# Patient Record
Sex: FEMALE | Race: WHITE | NOT HISPANIC OR LATINO | Employment: OTHER | URBAN - METROPOLITAN AREA
[De-identification: names, ages, dates, MRNs, and addresses within clinical notes are randomized per-mention and may not be internally consistent; named-entity substitution may affect disease eponyms.]

---

## 2017-02-19 ENCOUNTER — COMMUNICATION - HEALTHEAST (OUTPATIENT)
Dept: INTERNAL MEDICINE | Facility: CLINIC | Age: 82
End: 2017-02-19

## 2017-02-21 ENCOUNTER — COMMUNICATION - HEALTHEAST (OUTPATIENT)
Dept: INTERNAL MEDICINE | Facility: CLINIC | Age: 82
End: 2017-02-21

## 2017-03-15 ENCOUNTER — OFFICE VISIT - HEALTHEAST (OUTPATIENT)
Dept: INTERNAL MEDICINE | Facility: CLINIC | Age: 82
End: 2017-03-15

## 2017-03-15 DIAGNOSIS — I10 ESSENTIAL HYPERTENSION: ICD-10-CM

## 2017-05-09 ENCOUNTER — AMBULATORY - HEALTHEAST (OUTPATIENT)
Dept: NURSING | Facility: CLINIC | Age: 82
End: 2017-05-09

## 2017-05-16 ENCOUNTER — RECORDS - HEALTHEAST (OUTPATIENT)
Dept: ADMINISTRATIVE | Facility: OTHER | Age: 82
End: 2017-05-16

## 2017-05-23 ENCOUNTER — COMMUNICATION - HEALTHEAST (OUTPATIENT)
Dept: INTERNAL MEDICINE | Facility: CLINIC | Age: 82
End: 2017-05-23

## 2017-07-22 ENCOUNTER — OFFICE VISIT - HEALTHEAST (OUTPATIENT)
Dept: FAMILY MEDICINE | Facility: CLINIC | Age: 82
End: 2017-07-22
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
## 2017-08-03 ENCOUNTER — COMMUNICATION - HEALTHEAST (OUTPATIENT)
Dept: INTERNAL MEDICINE | Facility: CLINIC | Age: 82
End: 2017-08-03

## 2017-08-03 ENCOUNTER — OFFICE VISIT - HEALTHEAST (OUTPATIENT)
Dept: INTERNAL MEDICINE | Facility: CLINIC | Age: 82
End: 2017-08-03

## 2017-08-03 DIAGNOSIS — I10 HTN (HYPERTENSION): ICD-10-CM

## 2017-08-03 DIAGNOSIS — Z00.00 ROUTINE GENERAL MEDICAL EXAMINATION AT A HEALTH CARE FACILITY: ICD-10-CM

## 2017-08-03 DIAGNOSIS — E78.5 HYPERLIPEMIA: ICD-10-CM

## 2017-08-03 DIAGNOSIS — D48.5 NEOPLASM OF UNCERTAIN BEHAVIOR OF SKIN: ICD-10-CM

## 2017-08-03 DIAGNOSIS — I10 ESSENTIAL HYPERTENSION: ICD-10-CM

## 2017-08-03 DIAGNOSIS — M81.0 OSTEOPOROSIS: ICD-10-CM

## 2017-08-03 ASSESSMENT — MIFFLIN-ST. JEOR: SCORE: 916.2

## 2017-08-08 ENCOUNTER — AMBULATORY - HEALTHEAST (OUTPATIENT)
Dept: LAB | Facility: CLINIC | Age: 82
End: 2017-08-08

## 2017-08-08 DIAGNOSIS — M81.0 OSTEOPOROSIS: ICD-10-CM

## 2017-08-08 DIAGNOSIS — I10 ESSENTIAL HYPERTENSION: ICD-10-CM

## 2017-08-08 DIAGNOSIS — E78.5 HYPERLIPEMIA: ICD-10-CM

## 2017-08-08 LAB
CHOLEST SERPL-MCNC: 172 MG/DL
FASTING STATUS PATIENT QL REPORTED: YES
HDLC SERPL-MCNC: 55 MG/DL
LDLC SERPL CALC-MCNC: 104 MG/DL
TRIGL SERPL-MCNC: 67 MG/DL

## 2017-08-09 ENCOUNTER — COMMUNICATION - HEALTHEAST (OUTPATIENT)
Dept: INTERNAL MEDICINE | Facility: CLINIC | Age: 82
End: 2017-08-09

## 2017-08-23 ENCOUNTER — RECORDS - HEALTHEAST (OUTPATIENT)
Dept: ADMINISTRATIVE | Facility: OTHER | Age: 82
End: 2017-08-23

## 2017-10-23 ENCOUNTER — COMMUNICATION - HEALTHEAST (OUTPATIENT)
Dept: INTERNAL MEDICINE | Facility: CLINIC | Age: 82
End: 2017-10-23

## 2017-10-23 DIAGNOSIS — M81.0 OSTEOPOROSIS: ICD-10-CM

## 2017-10-23 DIAGNOSIS — I10 HTN (HYPERTENSION): ICD-10-CM

## 2017-11-08 ENCOUNTER — COMMUNICATION - HEALTHEAST (OUTPATIENT)
Dept: INTERNAL MEDICINE | Facility: CLINIC | Age: 82
End: 2017-11-08

## 2017-11-08 DIAGNOSIS — I10 ESSENTIAL HYPERTENSION: ICD-10-CM

## 2017-11-14 ENCOUNTER — RECORDS - HEALTHEAST (OUTPATIENT)
Dept: ADMINISTRATIVE | Facility: OTHER | Age: 82
End: 2017-11-14

## 2017-12-05 ENCOUNTER — COMMUNICATION - HEALTHEAST (OUTPATIENT)
Dept: INTERNAL MEDICINE | Facility: CLINIC | Age: 82
End: 2017-12-05

## 2017-12-05 DIAGNOSIS — E78.5 HYPERLIPEMIA: ICD-10-CM

## 2018-02-01 ENCOUNTER — RECORDS - HEALTHEAST (OUTPATIENT)
Dept: ADMINISTRATIVE | Facility: OTHER | Age: 83
End: 2018-02-01

## 2018-02-21 ENCOUNTER — COMMUNICATION - HEALTHEAST (OUTPATIENT)
Dept: INTERNAL MEDICINE | Facility: CLINIC | Age: 83
End: 2018-02-21

## 2018-02-23 ENCOUNTER — OFFICE VISIT - HEALTHEAST (OUTPATIENT)
Dept: INTERNAL MEDICINE | Facility: CLINIC | Age: 83
End: 2018-02-23

## 2018-02-23 DIAGNOSIS — Z01.818 PREOP GENERAL PHYSICAL EXAM: ICD-10-CM

## 2018-02-23 DIAGNOSIS — I10 ESSENTIAL HYPERTENSION: ICD-10-CM

## 2018-02-23 DIAGNOSIS — H26.9 CATARACT OF BOTH EYES: ICD-10-CM

## 2018-02-23 DIAGNOSIS — E78.5 HYPERLIPEMIA: ICD-10-CM

## 2018-03-07 ENCOUNTER — COMMUNICATION - HEALTHEAST (OUTPATIENT)
Dept: INTERNAL MEDICINE | Facility: CLINIC | Age: 83
End: 2018-03-07

## 2018-03-07 DIAGNOSIS — I10 ESSENTIAL HYPERTENSION: ICD-10-CM

## 2018-03-14 ENCOUNTER — OFFICE VISIT - HEALTHEAST (OUTPATIENT)
Dept: INTERNAL MEDICINE | Facility: CLINIC | Age: 83
End: 2018-03-14

## 2018-03-14 DIAGNOSIS — I10 ESSENTIAL HYPERTENSION: ICD-10-CM

## 2018-03-14 DIAGNOSIS — H61.22 IMPACTED CERUMEN OF LEFT EAR: ICD-10-CM

## 2018-07-10 ENCOUNTER — COMMUNICATION - HEALTHEAST (OUTPATIENT)
Dept: INTERNAL MEDICINE | Facility: CLINIC | Age: 83
End: 2018-07-10

## 2018-07-10 DIAGNOSIS — M81.0 OSTEOPOROSIS: ICD-10-CM

## 2018-09-06 ENCOUNTER — OFFICE VISIT - HEALTHEAST (OUTPATIENT)
Dept: INTERNAL MEDICINE | Facility: CLINIC | Age: 83
End: 2018-09-06

## 2018-09-06 DIAGNOSIS — I10 HTN (HYPERTENSION): ICD-10-CM

## 2018-09-06 DIAGNOSIS — E78.5 HYPERLIPEMIA: ICD-10-CM

## 2018-09-06 DIAGNOSIS — M81.0 OSTEOPOROSIS: ICD-10-CM

## 2018-09-06 DIAGNOSIS — Z00.00 ROUTINE GENERAL MEDICAL EXAMINATION AT A HEALTH CARE FACILITY: ICD-10-CM

## 2018-09-06 DIAGNOSIS — H61.21 IMPACTED CERUMEN OF RIGHT EAR: ICD-10-CM

## 2018-09-06 LAB
ALBUMIN SERPL-MCNC: 4.1 G/DL (ref 3.5–5)
ALP SERPL-CCNC: 67 U/L (ref 45–120)
ALT SERPL W P-5'-P-CCNC: 18 U/L (ref 0–45)
ANION GAP SERPL CALCULATED.3IONS-SCNC: 6 MMOL/L (ref 5–18)
AST SERPL W P-5'-P-CCNC: 20 U/L (ref 0–40)
BILIRUB SERPL-MCNC: 0.9 MG/DL (ref 0–1)
BUN SERPL-MCNC: 9 MG/DL (ref 8–28)
CALCIUM SERPL-MCNC: 9.5 MG/DL (ref 8.5–10.5)
CHLORIDE BLD-SCNC: 102 MMOL/L (ref 98–107)
CHOLEST SERPL-MCNC: 170 MG/DL
CO2 SERPL-SCNC: 29 MMOL/L (ref 22–31)
CREAT SERPL-MCNC: 0.7 MG/DL (ref 0.6–1.1)
FASTING STATUS PATIENT QL REPORTED: YES
GFR SERPL CREATININE-BSD FRML MDRD: >60 ML/MIN/1.73M2
GLUCOSE BLD-MCNC: 97 MG/DL (ref 70–125)
HDLC SERPL-MCNC: 58 MG/DL
LDLC SERPL CALC-MCNC: 96 MG/DL
POTASSIUM BLD-SCNC: 4 MMOL/L (ref 3.5–5)
PROT SERPL-MCNC: 7 G/DL (ref 6–8)
SODIUM SERPL-SCNC: 137 MMOL/L (ref 136–145)
TRIGL SERPL-MCNC: 81 MG/DL

## 2018-09-06 ASSESSMENT — MIFFLIN-ST. JEOR: SCORE: 886.78

## 2018-09-07 LAB — 25(OH)D3 SERPL-MCNC: 44.6 NG/ML (ref 30–80)

## 2018-09-10 ENCOUNTER — COMMUNICATION - HEALTHEAST (OUTPATIENT)
Dept: INTERNAL MEDICINE | Facility: CLINIC | Age: 83
End: 2018-09-10

## 2018-09-13 ENCOUNTER — OFFICE VISIT - HEALTHEAST (OUTPATIENT)
Dept: OTOLARYNGOLOGY | Facility: CLINIC | Age: 83
End: 2018-09-13

## 2018-09-13 DIAGNOSIS — H61.23 BILATERAL IMPACTED CERUMEN: ICD-10-CM

## 2018-09-17 ENCOUNTER — RECORDS - HEALTHEAST (OUTPATIENT)
Dept: BONE DENSITY | Facility: CLINIC | Age: 83
End: 2018-09-17

## 2018-09-17 ENCOUNTER — RECORDS - HEALTHEAST (OUTPATIENT)
Dept: ADMINISTRATIVE | Facility: OTHER | Age: 83
End: 2018-09-17

## 2018-09-17 DIAGNOSIS — M81.0 AGE-RELATED OSTEOPOROSIS WITHOUT CURRENT PATHOLOGICAL FRACTURE: ICD-10-CM

## 2019-03-05 ENCOUNTER — COMMUNICATION - HEALTHEAST (OUTPATIENT)
Dept: INTERNAL MEDICINE | Facility: CLINIC | Age: 84
End: 2019-03-05

## 2019-03-05 DIAGNOSIS — I10 ESSENTIAL HYPERTENSION: ICD-10-CM

## 2019-03-19 ENCOUNTER — COMMUNICATION - HEALTHEAST (OUTPATIENT)
Dept: FAMILY MEDICINE | Facility: CLINIC | Age: 84
End: 2019-03-19

## 2019-05-09 ENCOUNTER — COMMUNICATION - HEALTHEAST (OUTPATIENT)
Dept: INTERNAL MEDICINE | Facility: CLINIC | Age: 84
End: 2019-05-09

## 2019-05-09 DIAGNOSIS — I10 ESSENTIAL HYPERTENSION: ICD-10-CM

## 2019-05-16 ENCOUNTER — RECORDS - HEALTHEAST (OUTPATIENT)
Dept: ADMINISTRATIVE | Facility: OTHER | Age: 84
End: 2019-05-16

## 2019-07-29 ENCOUNTER — COMMUNICATION - HEALTHEAST (OUTPATIENT)
Dept: INTERNAL MEDICINE | Facility: CLINIC | Age: 84
End: 2019-07-29

## 2019-07-29 DIAGNOSIS — I10 ESSENTIAL HYPERTENSION: ICD-10-CM

## 2019-09-02 ENCOUNTER — COMMUNICATION - HEALTHEAST (OUTPATIENT)
Dept: INTERNAL MEDICINE | Facility: CLINIC | Age: 84
End: 2019-09-02

## 2019-09-02 DIAGNOSIS — I10 ESSENTIAL HYPERTENSION: ICD-10-CM

## 2019-09-17 ENCOUNTER — COMMUNICATION - HEALTHEAST (OUTPATIENT)
Dept: INTERNAL MEDICINE | Facility: CLINIC | Age: 84
End: 2019-09-17

## 2019-09-17 DIAGNOSIS — M81.0 OSTEOPOROSIS: ICD-10-CM

## 2019-09-24 ENCOUNTER — OFFICE VISIT - HEALTHEAST (OUTPATIENT)
Dept: INTERNAL MEDICINE | Facility: CLINIC | Age: 84
End: 2019-09-24

## 2019-09-24 DIAGNOSIS — I10 ESSENTIAL HYPERTENSION: ICD-10-CM

## 2019-09-24 DIAGNOSIS — Z12.31 VISIT FOR SCREENING MAMMOGRAM: ICD-10-CM

## 2019-09-24 DIAGNOSIS — Z51.81 ENCOUNTER FOR THERAPEUTIC DRUG MONITORING: ICD-10-CM

## 2019-09-24 DIAGNOSIS — E78.00 HYPERCHOLESTEROLEMIA: ICD-10-CM

## 2019-09-24 DIAGNOSIS — M81.0 OSTEOPOROSIS, UNSPECIFIED OSTEOPOROSIS TYPE, UNSPECIFIED PATHOLOGICAL FRACTURE PRESENCE: ICD-10-CM

## 2019-09-24 LAB
ALBUMIN SERPL-MCNC: 4.3 G/DL (ref 3.5–5)
ALP SERPL-CCNC: 70 U/L (ref 45–120)
ALT SERPL W P-5'-P-CCNC: 19 U/L (ref 0–45)
ANION GAP SERPL CALCULATED.3IONS-SCNC: 9 MMOL/L (ref 5–18)
AST SERPL W P-5'-P-CCNC: 23 U/L (ref 0–40)
BILIRUB SERPL-MCNC: 0.9 MG/DL (ref 0–1)
BUN SERPL-MCNC: 9 MG/DL (ref 8–28)
CALCIUM SERPL-MCNC: 9.5 MG/DL (ref 8.5–10.5)
CHLORIDE BLD-SCNC: 98 MMOL/L (ref 98–107)
CHOLEST SERPL-MCNC: 184 MG/DL
CO2 SERPL-SCNC: 27 MMOL/L (ref 22–31)
CREAT SERPL-MCNC: 0.72 MG/DL (ref 0.6–1.1)
ERYTHROCYTE [DISTWIDTH] IN BLOOD BY AUTOMATED COUNT: 10.7 % (ref 11–14.5)
FASTING STATUS PATIENT QL REPORTED: YES
GFR SERPL CREATININE-BSD FRML MDRD: >60 ML/MIN/1.73M2
GLUCOSE BLD-MCNC: 97 MG/DL (ref 70–125)
HCT VFR BLD AUTO: 39.7 % (ref 35–47)
HDLC SERPL-MCNC: 60 MG/DL
HGB BLD-MCNC: 13.4 G/DL (ref 12–16)
LDLC SERPL CALC-MCNC: 106 MG/DL
MCH RBC QN AUTO: 33.7 PG (ref 27–34)
MCHC RBC AUTO-ENTMCNC: 33.9 G/DL (ref 32–36)
MCV RBC AUTO: 100 FL (ref 80–100)
PLATELET # BLD AUTO: 221 THOU/UL (ref 140–440)
PMV BLD AUTO: 7.8 FL (ref 7–10)
POTASSIUM BLD-SCNC: 4.2 MMOL/L (ref 3.5–5)
PROT SERPL-MCNC: 7.1 G/DL (ref 6–8)
RBC # BLD AUTO: 3.99 MILL/UL (ref 3.8–5.4)
SODIUM SERPL-SCNC: 134 MMOL/L (ref 136–145)
TRIGL SERPL-MCNC: 90 MG/DL
WBC: 6.3 THOU/UL (ref 4–11)

## 2019-09-25 ENCOUNTER — COMMUNICATION - HEALTHEAST (OUTPATIENT)
Dept: INTERNAL MEDICINE | Facility: CLINIC | Age: 84
End: 2019-09-25

## 2020-02-19 ENCOUNTER — RECORDS - HEALTHEAST (OUTPATIENT)
Dept: ADMINISTRATIVE | Facility: OTHER | Age: 85
End: 2020-02-19

## 2020-09-28 ENCOUNTER — COMMUNICATION - HEALTHEAST (OUTPATIENT)
Dept: SCHEDULING | Facility: CLINIC | Age: 85
End: 2020-09-28

## 2020-09-28 ENCOUNTER — COMMUNICATION - HEALTHEAST (OUTPATIENT)
Dept: INTERNAL MEDICINE | Facility: CLINIC | Age: 85
End: 2020-09-28

## 2020-09-28 DIAGNOSIS — E78.00 HYPERCHOLESTEROLEMIA: ICD-10-CM

## 2020-09-28 DIAGNOSIS — I10 ESSENTIAL HYPERTENSION: ICD-10-CM

## 2020-09-29 ENCOUNTER — OFFICE VISIT - HEALTHEAST (OUTPATIENT)
Dept: INTERNAL MEDICINE | Facility: CLINIC | Age: 85
End: 2020-09-29

## 2020-09-29 DIAGNOSIS — E78.5 HYPERLIPIDEMIA, UNSPECIFIED HYPERLIPIDEMIA TYPE: ICD-10-CM

## 2020-09-29 DIAGNOSIS — M54.41 ACUTE BILATERAL LOW BACK PAIN WITH RIGHT-SIDED SCIATICA: ICD-10-CM

## 2020-09-29 DIAGNOSIS — I10 ESSENTIAL HYPERTENSION: ICD-10-CM

## 2020-09-29 DIAGNOSIS — E78.00 HYPERCHOLESTEROLEMIA: ICD-10-CM

## 2020-09-30 ENCOUNTER — AMBULATORY - HEALTHEAST (OUTPATIENT)
Dept: LAB | Facility: CLINIC | Age: 85
End: 2020-09-30

## 2020-09-30 DIAGNOSIS — I10 ESSENTIAL HYPERTENSION: ICD-10-CM

## 2020-09-30 DIAGNOSIS — E78.00 HYPERCHOLESTEROLEMIA: ICD-10-CM

## 2020-09-30 LAB
ALBUMIN SERPL-MCNC: 4.5 G/DL (ref 3.5–5)
ALP SERPL-CCNC: 68 U/L (ref 45–120)
ALT SERPL W P-5'-P-CCNC: 17 U/L (ref 0–45)
ANION GAP SERPL CALCULATED.3IONS-SCNC: 8 MMOL/L (ref 5–18)
AST SERPL W P-5'-P-CCNC: 19 U/L (ref 0–40)
BILIRUB SERPL-MCNC: 0.7 MG/DL (ref 0–1)
BUN SERPL-MCNC: 14 MG/DL (ref 8–28)
CALCIUM SERPL-MCNC: 10 MG/DL (ref 8.5–10.5)
CHLORIDE BLD-SCNC: 98 MMOL/L (ref 98–107)
CHOLEST SERPL-MCNC: 184 MG/DL
CO2 SERPL-SCNC: 29 MMOL/L (ref 22–31)
CREAT SERPL-MCNC: 0.71 MG/DL (ref 0.6–1.1)
ERYTHROCYTE [DISTWIDTH] IN BLOOD BY AUTOMATED COUNT: 11.2 % (ref 11–14.5)
FASTING STATUS PATIENT QL REPORTED: YES
GFR SERPL CREATININE-BSD FRML MDRD: >60 ML/MIN/1.73M2
GLUCOSE BLD-MCNC: 102 MG/DL (ref 70–125)
HCT VFR BLD AUTO: 40.7 % (ref 35–47)
HDLC SERPL-MCNC: 65 MG/DL
HGB BLD-MCNC: 13.5 G/DL (ref 12–16)
LDLC SERPL CALC-MCNC: 104 MG/DL
MCH RBC QN AUTO: 32.6 PG (ref 27–34)
MCHC RBC AUTO-ENTMCNC: 33.2 G/DL (ref 32–36)
MCV RBC AUTO: 98 FL (ref 80–100)
PLATELET # BLD AUTO: 263 THOU/UL (ref 140–440)
PMV BLD AUTO: 7.3 FL (ref 7–10)
POTASSIUM BLD-SCNC: 4.3 MMOL/L (ref 3.5–5)
PROT SERPL-MCNC: 7.1 G/DL (ref 6–8)
RBC # BLD AUTO: 4.14 MILL/UL (ref 3.8–5.4)
SODIUM SERPL-SCNC: 135 MMOL/L (ref 136–145)
TRIGL SERPL-MCNC: 77 MG/DL
WBC: 6.7 THOU/UL (ref 4–11)

## 2020-10-04 ENCOUNTER — VIRTUAL VISIT (OUTPATIENT)
Dept: URGENT CARE | Facility: CLINIC | Age: 85
End: 2020-10-04
Payer: MEDICARE

## 2020-10-04 ENCOUNTER — COMMUNICATION - HEALTHEAST (OUTPATIENT)
Dept: SCHEDULING | Facility: CLINIC | Age: 85
End: 2020-10-04

## 2020-10-04 DIAGNOSIS — M54.41 ACUTE RIGHT-SIDED LOW BACK PAIN WITH RIGHT-SIDED SCIATICA: Primary | ICD-10-CM

## 2020-10-04 PROBLEM — M81.0 OSTEOPOROSIS: Status: ACTIVE | Noted: 2020-10-04

## 2020-10-04 PROCEDURE — 99203 OFFICE O/P NEW LOW 30 MIN: CPT | Mod: 95 | Performed by: FAMILY MEDICINE

## 2020-10-04 RX ORDER — ATORVASTATIN CALCIUM 10 MG/1
10 TABLET, FILM COATED ORAL DAILY
COMMUNITY
Start: 2020-09-29 | End: 2021-11-16

## 2020-10-04 RX ORDER — METHYLPREDNISOLONE 4 MG
TABLET, DOSE PACK ORAL
Qty: 21 TABLET | Refills: 0 | Status: SHIPPED | OUTPATIENT
Start: 2020-10-04 | End: 2021-11-04

## 2020-10-04 RX ORDER — LOSARTAN POTASSIUM 100 MG/1
100 TABLET ORAL
COMMUNITY
Start: 2020-09-29 | End: 2021-09-24

## 2020-10-04 RX ORDER — ATENOLOL 25 MG/1
25 TABLET ORAL DAILY
COMMUNITY
Start: 2020-09-29 | End: 2021-11-22

## 2020-10-04 RX ORDER — AMLODIPINE BESYLATE 5 MG/1
TABLET ORAL
COMMUNITY
Start: 2020-09-29 | End: 2021-12-30

## 2020-10-04 NOTE — PROGRESS NOTES
"Martha Chamorro is a 85 year old female who is being evaluated via a billable telephone visit.      The patient has been notified of following at scheduling:    Telephone visits are billed at different rates depending on your insurance coverage. During this emergency period, for some insurers they may be billed the same as an in-person visit.  Please reach out to your insurance provider with any questions.\"    Patient has given verbal consent for Telephone visit?  Yes      Subjective     Martha Chamorro is a 85 year old female who presents via phone visit today for the following health issues: low back pain    HPI        Turned funny a couple of weeks ago and started to have low back pain, getting worse in the last week.   Did see Dr Martínez (PCP clinic) on 9/29 and was told to stop taking Aleve and start taking Tylenol instead.  She tried tylenol alone (one extra strength tab every 6 hours) and continued to have more pain so she started Aleve again and took one at 9:30am on Saturday morning and again this morning at 4:30am.   She has also been wearing a back brace, icing and using heat. She has a body pillow and has had family give her a massage.   None of this seems to be helping, she could not sleep last night. Took 2 benadryl and still couldn't sleep.   She does have pain that shoots from the low back down into the back of her right leg.   This is something she has had before - has had imaging and no significant findings, has bulging disc and sciatica.   Her foot does feel a little numb if she walks excessively, but comes back when she rests. No foot drop.   No changes in bowel or bladder.  She is hydrating and not having unusually large amounts of urination. No burning with urination. No fever.     Aleve has worked in the past. Usually got better quicker before.     She is wondering what else she can do. Daughter, Ileana who is also on the phone with us today is concerned about anything that could make her sleepy " overnight and at risk for falls. She lives just with her elderly , nobody who can take care of her- is isolating from family and others due to covid.       Review of Systems   See above.       Objective        Vitals:  No vitals were obtained today due to virtual visit.    healthy, alert and no distress  PSYCH: Alert and oriented times 3; coherent speech, normal   rate and volume, able to articulate logical thoughts, able   to abstract reason, no tangential thoughts, no hallucinations   or delusions  Her affect is normal  RESP: No cough, no audible wheezing, able to talk in full sentences  Remainder of exam unable to be completed due to telephone visits            Assessment/Plan:    Assessment & Plan     Acute right-sided low back pain with right-sided sciatica: acute episode on chronic low back issues. She has had 2 weeks of pain now, getting worse. Not an ideal candidate for NSAIDs, tylenol not helping enough though she is also not taking the maximum dose. Concern given her age and living without an onsite caregiver in using anything that could cause sedation and risk breaking a hip overnight walking in the dark sedated. Will try a medrol dosepak, discussed 3000mg of tylenol ok per day for several days - will start with 1G tonight before bed since she took Aleve this morning and had a better day. Discussed concerning symptoms for which to be seen in urgent care or the ED. Recommended scheduling follow-up with PCP this week with video if possible (she continues to wish to stay out of the clinic due to recent surge in Covid-19 which is reasonable given her age).   - methylPREDNISolone (MEDROL DOSEPAK) 4 MG tablet therapy pack; Follow Package Directions        Tammi Hall MD  Family Medicine   [facility]    Phone call duration:  13 minutes

## 2020-10-05 ENCOUNTER — COMMUNICATION - HEALTHEAST (OUTPATIENT)
Dept: INTERNAL MEDICINE | Facility: CLINIC | Age: 85
End: 2020-10-05

## 2020-10-09 ENCOUNTER — RECORDS - HEALTHEAST (OUTPATIENT)
Dept: ADMINISTRATIVE | Facility: OTHER | Age: 85
End: 2020-10-09

## 2020-10-28 ENCOUNTER — RECORDS - HEALTHEAST (OUTPATIENT)
Dept: ADMINISTRATIVE | Facility: OTHER | Age: 85
End: 2020-10-28

## 2020-10-28 ENCOUNTER — COMMUNICATION - HEALTHEAST (OUTPATIENT)
Dept: LAB | Facility: CLINIC | Age: 85
End: 2020-10-28

## 2020-10-28 DIAGNOSIS — E87.1 HYPONATREMIA: ICD-10-CM

## 2020-10-29 ENCOUNTER — AMBULATORY - HEALTHEAST (OUTPATIENT)
Dept: LAB | Facility: CLINIC | Age: 85
End: 2020-10-29

## 2020-10-29 DIAGNOSIS — E87.1 HYPONATREMIA: ICD-10-CM

## 2020-10-29 LAB
ANION GAP SERPL CALCULATED.3IONS-SCNC: 12 MMOL/L (ref 5–18)
BUN SERPL-MCNC: 6 MG/DL (ref 8–28)
CALCIUM SERPL-MCNC: 9.6 MG/DL (ref 8.5–10.5)
CHLORIDE BLD-SCNC: 98 MMOL/L (ref 98–107)
CO2 SERPL-SCNC: 24 MMOL/L (ref 22–31)
CREAT SERPL-MCNC: 0.61 MG/DL (ref 0.6–1.1)
GFR SERPL CREATININE-BSD FRML MDRD: >60 ML/MIN/1.73M2
GLUCOSE BLD-MCNC: 97 MG/DL (ref 70–125)
POTASSIUM BLD-SCNC: 4.5 MMOL/L (ref 3.5–5)
SODIUM SERPL-SCNC: 134 MMOL/L (ref 136–145)

## 2020-10-30 ENCOUNTER — COMMUNICATION - HEALTHEAST (OUTPATIENT)
Dept: INTERNAL MEDICINE | Facility: CLINIC | Age: 85
End: 2020-10-30

## 2020-11-02 ENCOUNTER — OFFICE VISIT - HEALTHEAST (OUTPATIENT)
Dept: INTERNAL MEDICINE | Facility: CLINIC | Age: 85
End: 2020-11-02

## 2020-11-02 DIAGNOSIS — I10 ESSENTIAL HYPERTENSION: ICD-10-CM

## 2020-11-02 DIAGNOSIS — E87.1 HYPONATREMIA: ICD-10-CM

## 2020-11-02 DIAGNOSIS — M54.16 LUMBAR RADICULOPATHY: ICD-10-CM

## 2020-11-24 ENCOUNTER — RECORDS - HEALTHEAST (OUTPATIENT)
Dept: ADMINISTRATIVE | Facility: OTHER | Age: 85
End: 2020-11-24

## 2020-12-07 ENCOUNTER — OFFICE VISIT - HEALTHEAST (OUTPATIENT)
Dept: INTERNAL MEDICINE | Facility: CLINIC | Age: 85
End: 2020-12-07

## 2020-12-07 DIAGNOSIS — M51.26 LUMBAR DISC HERNIATION: ICD-10-CM

## 2020-12-07 DIAGNOSIS — M81.0 OSTEOPOROSIS, UNSPECIFIED OSTEOPOROSIS TYPE, UNSPECIFIED PATHOLOGICAL FRACTURE PRESENCE: ICD-10-CM

## 2020-12-07 DIAGNOSIS — F41.9 ANXIETY: ICD-10-CM

## 2020-12-07 DIAGNOSIS — Z23 NEED FOR IMMUNIZATION AGAINST INFLUENZA: ICD-10-CM

## 2020-12-07 DIAGNOSIS — Z51.81 ENCOUNTER FOR THERAPEUTIC DRUG MONITORING: ICD-10-CM

## 2020-12-07 DIAGNOSIS — I10 ESSENTIAL HYPERTENSION: ICD-10-CM

## 2020-12-07 LAB
ALBUMIN SERPL-MCNC: 4.2 G/DL (ref 3.5–5)
ALP SERPL-CCNC: 47 U/L (ref 45–120)
ALT SERPL W P-5'-P-CCNC: 23 U/L (ref 0–45)
ANION GAP SERPL CALCULATED.3IONS-SCNC: 11 MMOL/L (ref 5–18)
AST SERPL W P-5'-P-CCNC: 20 U/L (ref 0–40)
BILIRUB SERPL-MCNC: 0.6 MG/DL (ref 0–1)
BUN SERPL-MCNC: 8 MG/DL (ref 8–28)
CALCIUM SERPL-MCNC: 9.8 MG/DL (ref 8.5–10.5)
CHLORIDE BLD-SCNC: 99 MMOL/L (ref 98–107)
CO2 SERPL-SCNC: 27 MMOL/L (ref 22–31)
CREAT SERPL-MCNC: 0.61 MG/DL (ref 0.6–1.1)
GFR SERPL CREATININE-BSD FRML MDRD: >60 ML/MIN/1.73M2
GLUCOSE BLD-MCNC: 99 MG/DL (ref 70–125)
POTASSIUM BLD-SCNC: 4 MMOL/L (ref 3.5–5)
PROT SERPL-MCNC: 6.5 G/DL (ref 6–8)
SODIUM SERPL-SCNC: 137 MMOL/L (ref 136–145)

## 2020-12-08 ENCOUNTER — COMMUNICATION - HEALTHEAST (OUTPATIENT)
Dept: INTERNAL MEDICINE | Facility: CLINIC | Age: 85
End: 2020-12-08

## 2020-12-16 ENCOUNTER — OFFICE VISIT - HEALTHEAST (OUTPATIENT)
Dept: INTERNAL MEDICINE | Facility: CLINIC | Age: 85
End: 2020-12-16

## 2020-12-16 DIAGNOSIS — G89.29 CHRONIC BILATERAL LOW BACK PAIN, UNSPECIFIED WHETHER SCIATICA PRESENT: ICD-10-CM

## 2020-12-16 DIAGNOSIS — I10 ESSENTIAL HYPERTENSION: ICD-10-CM

## 2020-12-16 DIAGNOSIS — M54.50 CHRONIC BILATERAL LOW BACK PAIN, UNSPECIFIED WHETHER SCIATICA PRESENT: ICD-10-CM

## 2021-02-10 ENCOUNTER — AMBULATORY - HEALTHEAST (OUTPATIENT)
Dept: NURSING | Facility: CLINIC | Age: 86
End: 2021-02-10

## 2021-03-03 ENCOUNTER — AMBULATORY - HEALTHEAST (OUTPATIENT)
Dept: NURSING | Facility: CLINIC | Age: 86
End: 2021-03-03

## 2021-05-14 ENCOUNTER — OFFICE VISIT - HEALTHEAST (OUTPATIENT)
Dept: INTERNAL MEDICINE | Facility: CLINIC | Age: 86
End: 2021-05-14

## 2021-05-14 DIAGNOSIS — Z51.81 ENCOUNTER FOR THERAPEUTIC DRUG MONITORING: ICD-10-CM

## 2021-05-14 DIAGNOSIS — E78.00 HYPERCHOLESTEROLEMIA: ICD-10-CM

## 2021-05-14 DIAGNOSIS — I10 ESSENTIAL HYPERTENSION: ICD-10-CM

## 2021-05-14 LAB
ALBUMIN SERPL-MCNC: 4.1 G/DL (ref 3.5–5)
ALP SERPL-CCNC: 68 U/L (ref 45–120)
ALT SERPL W P-5'-P-CCNC: 18 U/L (ref 0–45)
ANION GAP SERPL CALCULATED.3IONS-SCNC: 11 MMOL/L (ref 5–18)
AST SERPL W P-5'-P-CCNC: 22 U/L (ref 0–40)
BILIRUB SERPL-MCNC: 0.5 MG/DL (ref 0–1)
BUN SERPL-MCNC: 10 MG/DL (ref 8–28)
CALCIUM SERPL-MCNC: 9.6 MG/DL (ref 8.5–10.5)
CHLORIDE BLD-SCNC: 104 MMOL/L (ref 98–107)
CHOLEST SERPL-MCNC: 179 MG/DL
CK SERPL-CCNC: 103 U/L (ref 30–190)
CO2 SERPL-SCNC: 27 MMOL/L (ref 22–31)
CREAT SERPL-MCNC: 0.71 MG/DL (ref 0.6–1.1)
FASTING STATUS PATIENT QL REPORTED: YES
GFR SERPL CREATININE-BSD FRML MDRD: >60 ML/MIN/1.73M2
GLUCOSE BLD-MCNC: 117 MG/DL (ref 70–125)
HDLC SERPL-MCNC: 69 MG/DL
LDLC SERPL CALC-MCNC: 100 MG/DL
POTASSIUM BLD-SCNC: 4.2 MMOL/L (ref 3.5–5)
PROT SERPL-MCNC: 7.3 G/DL (ref 6–8)
SODIUM SERPL-SCNC: 142 MMOL/L (ref 136–145)
TRIGL SERPL-MCNC: 51 MG/DL

## 2021-05-14 ASSESSMENT — MIFFLIN-ST. JEOR: SCORE: 863.19

## 2021-05-17 ENCOUNTER — COMMUNICATION - HEALTHEAST (OUTPATIENT)
Dept: INTERNAL MEDICINE | Facility: CLINIC | Age: 86
End: 2021-05-17

## 2021-05-26 ENCOUNTER — RECORDS - HEALTHEAST (OUTPATIENT)
Dept: ADMINISTRATIVE | Facility: CLINIC | Age: 86
End: 2021-05-26

## 2021-05-27 ENCOUNTER — RECORDS - HEALTHEAST (OUTPATIENT)
Dept: ADMINISTRATIVE | Facility: CLINIC | Age: 86
End: 2021-05-27

## 2021-05-27 VITALS
SYSTOLIC BLOOD PRESSURE: 140 MMHG | DIASTOLIC BLOOD PRESSURE: 70 MMHG | WEIGHT: 106 LBS | HEART RATE: 84 BPM | BODY MASS INDEX: 20.01 KG/M2 | OXYGEN SATURATION: 96 % | HEIGHT: 61 IN

## 2021-05-27 NOTE — TELEPHONE ENCOUNTER
Who is calling:  Patient   Reason for Call:  Checking on status of request  Date of last appointment with primary care:   Okay to leave a detailed message: Yes

## 2021-05-28 ENCOUNTER — RECORDS - HEALTHEAST (OUTPATIENT)
Dept: ADMINISTRATIVE | Facility: CLINIC | Age: 86
End: 2021-05-28

## 2021-05-28 NOTE — TELEPHONE ENCOUNTER
Patient is looking to establish care with Dr. Rivera. Waiting for a response from her.  Routing refill.  Kasandra Barber CMA ............... 1:12 PM, 05/09/19

## 2021-05-28 NOTE — TELEPHONE ENCOUNTER
Former patient of savana Hagen & has not established care with another provider.  Please assign refill request to covering provider per Clinic standard process.      Refill Approved    Rx renewed per Medication Renewal Policy. Medication was last renewed on 2/23/18.    Tammi Dawson, [facility] Connection Triage/Med Refill 5/9/2019     Requested Prescriptions   Pending Prescriptions Disp Refills     losartan (COZAAR) 100 MG tablet [Pharmacy Med Name: Losartan Potassium Oral Tablet 100 MG] 90 tablet 2     Sig: Take 1 tablet (100 mg total) by mouth daily.       Angiotensin Receptor Blocker Protocol Passed - 5/9/2019 10:32 AM        Passed - PCP or prescribing provider visit in past 12 months       Last office visit with prescriber/PCP: Visit date not found OR same dept: Visit date not found OR same specialty: 3/14/2018 Savana Hagen MD  Last physical: 2/23/2018 Last MTM visit: Visit date not found   Next visit within 3 mo: Visit date not found  Next physical within 3 mo: Visit date not found  Prescriber OR PCP: Pablo Dominguez CNP  Last diagnosis associated with med order: 1. Essential hypertension  - losartan (COZAAR) 100 MG tablet [Pharmacy Med Name: Losartan Potassium Oral Tablet 100 MG]; Take 1 tablet (100 mg total) by mouth daily.  Dispense: 90 tablet; Refill: 2    If protocol passes may refill for 12 months if within 3 months of last provider visit (or a total of 15 months).             Passed - Serum potassium within the past 12 months     Lab Results   Component Value Date    Potassium 4.0 09/06/2018             Passed - Blood pressure filed in past 12 months     BP Readings from Last 1 Encounters:   09/06/18 102/60             Passed - Serum creatinine within the past 12 months     Creatinine   Date Value Ref Range Status   09/06/2018 0.70 0.60 - 1.10 mg/dL Final

## 2021-05-29 ENCOUNTER — RECORDS - HEALTHEAST (OUTPATIENT)
Dept: ADMINISTRATIVE | Facility: CLINIC | Age: 86
End: 2021-05-29

## 2021-05-30 ENCOUNTER — RECORDS - HEALTHEAST (OUTPATIENT)
Dept: ADMINISTRATIVE | Facility: CLINIC | Age: 86
End: 2021-05-30

## 2021-05-30 VITALS — BODY MASS INDEX: 21.79 KG/M2 | WEIGHT: 115.31 LBS

## 2021-05-30 NOTE — TELEPHONE ENCOUNTER
Patient has upcoming appointment with Dr. Lewis 9/24/19. Routing refill.  Kasandra Barber CMA ............... 8:47 AM, 07/30/19

## 2021-05-30 NOTE — TELEPHONE ENCOUNTER
Former patient of savana Hagen & has not established care with another provider.  Please assign refill request to covering provider per Clinic standard process.      Refill Approved    Rx renewed per Medication Renewal Policy. Medication was last renewed on 5/9/19.    Tammi Dawson, [facility] Connection Triage/Med Refill 7/29/2019     Requested Prescriptions   Pending Prescriptions Disp Refills     losartan (COZAAR) 100 MG tablet [Pharmacy Med Name: Losartan Potassium Oral Tablet 100 MG] 90 tablet 0     Sig: Take 1 tablet (100 mg total) by mouth daily.       Angiotensin Receptor Blocker Protocol Passed - 7/29/2019  1:38 PM        Passed - PCP or prescribing provider visit in past 12 months       Last office visit with prescriber/PCP: Visit date not found OR same dept: Visit date not found OR same specialty: 3/14/2018 Savana Hagen MD  Last physical: Visit date not found Last MTM visit: Visit date not found   Next visit within 3 mo: Visit date not found  Next physical within 3 mo: Visit date not found  Prescriber OR PCP: Raul Lewis MD  Last diagnosis associated with med order: 1. Essential hypertension  - losartan (COZAAR) 100 MG tablet [Pharmacy Med Name: Losartan Potassium Oral Tablet 100 MG]; Take 1 tablet (100 mg total) by mouth daily.  Dispense: 90 tablet; Refill: 0    If protocol passes may refill for 12 months if within 3 months of last provider visit (or a total of 15 months).             Passed - Serum potassium within the past 12 months     Lab Results   Component Value Date    Potassium 4.0 09/06/2018             Passed - Blood pressure filed in past 12 months     BP Readings from Last 1 Encounters:   09/06/18 102/60             Passed - Serum creatinine within the past 12 months     Creatinine   Date Value Ref Range Status   09/06/2018 0.70 0.60 - 1.10 mg/dL Final

## 2021-05-31 VITALS — HEIGHT: 62 IN | BODY MASS INDEX: 21.02 KG/M2 | WEIGHT: 114.19 LBS

## 2021-05-31 VITALS — BODY MASS INDEX: 21.54 KG/M2 | WEIGHT: 114 LBS

## 2021-05-31 NOTE — TELEPHONE ENCOUNTER
RN cannot approve Refill Request    RN can NOT refill this medication No established PCP. Last office visit: 3/14/2018 Savana Hagen MD Last Physical: 9/6/2018 Last MTM visit: Visit date not found Last visit same specialty: 3/14/2018 Savana Hagen MD.  Next visit within 3 mo: Visit date not found  Next physical within 3 mo: Visit date not found      Ahsleigh New, [facility] Connection Triage/Med Refill 9/2/2019    Requested Prescriptions   Pending Prescriptions Disp Refills     atenolol (TENORMIN) 25 MG tablet [Pharmacy Med Name: Atenolol Oral Tablet 25 MG] 90 tablet 0     Sig: Take 1 tablet (25 mg total) by mouth daily.       Beta-Blockers Refill Protocol Passed - 9/2/2019  7:00 AM        Passed - PCP or prescribing provider visit in past 12 months or next 3 months     Last office visit with prescriber/PCP: 3/14/2018 Savana Hagen MD OR same dept: Visit date not found OR same specialty: 3/14/2018 Savana Hagen MD  Last physical: 9/6/2018 Last MTM visit: Visit date not found   Next visit within 3 mo: Visit date not found  Next physical within 3 mo: Visit date not found  Prescriber OR PCP: Savana Hagen MD  Last diagnosis associated with med order: 1. Essential hypertension  - atenolol (TENORMIN) 25 MG tablet [Pharmacy Med Name: Atenolol Oral Tablet 25 MG]; Take 1 tablet (25 mg total) by mouth daily.  Dispense: 90 tablet; Refill: 0    If protocol passes may refill for 12 months if within 3 months of last provider visit (or a total of 15 months).             Passed - Blood pressure filed in past 12 months     BP Readings from Last 1 Encounters:   09/06/18 102/60

## 2021-06-01 VITALS — WEIGHT: 111.2 LBS | BODY MASS INDEX: 20.99 KG/M2 | HEIGHT: 61 IN

## 2021-06-01 VITALS — BODY MASS INDEX: 20.92 KG/M2 | WEIGHT: 114.38 LBS

## 2021-06-01 VITALS — WEIGHT: 112.5 LBS | BODY MASS INDEX: 20.58 KG/M2

## 2021-06-01 NOTE — TELEPHONE ENCOUNTER
Patient has upcoming appointment with Dr. Lewis 9/24/19. Routing refill.  Kasandra Barber CMA ............... 1:58 PM, 09/17/19

## 2021-06-01 NOTE — PATIENT INSTRUCTIONS - HE
Stop alendronate when you finish your current supply.    Plan to repeat DEXA scan for bone density in 1 to 2 years.    Continue daily walking.  Continue calcium and vitamin D supplement.    If blood pressure is running above 150/90 for more than a day, or running less than 110/60 especially with lightheaded dizzy spells, contact me to consider adjustment in your blood pressure medication.    You could consider stopping atorvastatin in the future, especially if you have muscle achiness or weakness.    Get flu shot this fall.    Follow-up in 6 months for routine checkup.  You do not need to fast for that visit.    Results of today's lab work will be mailed to you.    Make appointment to see ophthalmology this fall for routine yearly checkup.    Consider scheduling mammogram for screening.

## 2021-06-01 NOTE — TELEPHONE ENCOUNTER
Former patient of savana Hagen & has not established care with another provider.  Please assign refill request to covering provider per Clinic standard process.      RN cannot approve Refill Request    RN can NOT refill this medication Protocol failed and NO refill given.       Tammi Dawson, [facility] Connection Triage/Med Refill 9/17/2019    Requested Prescriptions   Pending Prescriptions Disp Refills     alendronate (FOSAMAX) 70 MG tablet [Pharmacy Med Name: Alendronate Sodium Oral Tablet 70 MG] 12 tablet 2     Sig: TAKE ONE TABLET BY MOUTH ONCE WEEKLY. TAKE FIRST THING IN THE MORNING WITH WATER. DO NOT EAT, DRINK OR LIE DOWN FOR 30 MINUTES       Biphosphonates Refill Protocol Failed - 9/17/2019  7:00 AM        Failed - PCP or prescribing provider visit in last 12 months     Last office visit with prescriber/PCP: 3/14/2018 Savana Hagen MD OR same dept: Visit date not found OR same specialty: 3/14/2018 Savana Hagen MD  Last physical: 9/6/2018 Last MTM visit: Visit date not found   Next visit within 3 mo: Visit date not found  Next physical within 3 mo: Visit date not found  Prescriber OR PCP: Savana Hagen MD  Last diagnosis associated with med order: 1. Osteoporosis  - alendronate (FOSAMAX) 70 MG tablet [Pharmacy Med Name: Alendronate Sodium Oral Tablet 70 MG]; TAKE ONE TABLET BY MOUTH ONCE WEEKLY. TAKE FIRST THING IN THE MORNING WITH WATER. DO NOT EAT, DRINK OR LIE DOWN FOR 30 MINUTES  Dispense: 12 tablet; Refill: 2    If protocol passes may refill for 12 months if within 3 months of last provider visit (or a total of 15 months).             Failed - Serum creatinine in last 12 months     Creatinine   Date Value Ref Range Status   09/06/2018 0.70 0.60 - 1.10 mg/dL Final

## 2021-06-03 VITALS
DIASTOLIC BLOOD PRESSURE: 74 MMHG | SYSTOLIC BLOOD PRESSURE: 174 MMHG | HEART RATE: 64 BPM | WEIGHT: 110 LBS | BODY MASS INDEX: 20.78 KG/M2

## 2021-06-09 NOTE — PROGRESS NOTES
ASSESSMENT and PLAN:  1. Essential hypertension  Her BP is better today than at last check.  She has some anxiety that she knows contributes.  She's working on exercise to help manage that.   We'll have her monitor her BP at home and then have a CA f/u in a few weeks for recheck.    Patient Instructions   Please monitor your BP 1-2x/week for the next several weeks.  Bring in those numbers in 6-8 weeks.  If it's looking good, then set up an assistant visit for a BP check.  If it's looking off, then see me.  Take care!        No Follow-up on file.    CHIEF COMPLAINT:  Chief Complaint   Patient presents with     Hypertension       HISTORY OF PRESENT ILLNESS:  Martha Chamorro is a 81 y.o. female  presenting to the clinic today for hypertension. Her blood pressure was very good around August. In February her blood pressure reading was elevated, and the stress it caused led her to no longer check her blood pressure regularly at home. The next time she measured her blood pressure it was around 133 systolic. She is exercising at the  with her  regularly.     REVIEW OF SYSTEMS:   She still experiences the numbness down her left leg that she contributes to a herniated disk from 10 years ago. All other systems are negative.      TOBACCO USE:  History   Smoking Status     Former Smoker   Smokeless Tobacco     Not on file       VITALS:  Vitals:    03/15/17 1140   BP: 142/70   Patient Site: Right Arm   Pulse: 80   Weight: 115 lb 5 oz (52.3 kg)     Wt Readings from Last 3 Encounters:   03/15/17 115 lb 5 oz (52.3 kg)   06/22/16 111 lb (50.3 kg)   07/15/15 109 lb 9.6 oz (49.7 kg)     PHYSICAL EXAM:  Constitutional:  Reveals an alert, pleasant elderly female.   Vitals:  Noted.    Neurologic: Normal     ADDITIONAL HISTORY SUMMARIZED (2): Reviewed note from 6/22/16 regarding hypertension.  DECISION TO OBTAIN EXTRA INFORMATION (1): None.   RADIOLOGY TESTS (1): None.  LABS (1): Reviewed labs from 6/22/16.   MEDICINE TESTS (1):  None.  INDEPENDENT REVIEW (2 each): None.     The visit lasted a total of 6 minutes face to face with the patient. Over 50% of the time was spent counseling and educating the patient about hypertension.    I, Smitha Murguia, am scribing for and in the presence of, Dr. Savana Hagen.    I, Dr. Savana Hagen, personally performed the services described in this documentation, as scribed by Smitha Murguia in my presence, and it is both accurate and complete.    MEDICATIONS:  Current Outpatient Prescriptions   Medication Sig Dispense Refill     alendronate (FOSAMAX) 70 MG tablet TAKE ONE TABLET BY MOUTH ONCE WEEKLY. TAKE 1ST THING IN THE AM WITH WATER. DO NOT EAT/DRINK/LIE DOWN FOR 30 MINS 12 tablet 0     alendronate (FOSAMAX) 70 MG tablet TAKE ONE TABLET BY MOUTH ONCE WEEKLY. TAKE 1ST THING IN THE AM WITH WATER. DO NOT EAT/DRINK/LIE DOWN FOR 30 MINS 12 tablet 3     amLODIPine (NORVASC) 5 MG tablet TAKE 1 TABLET (5 MG TOTAL) BY MOUTH DAILY. 90 tablet 3     atenolol (TENORMIN) 50 MG tablet Take 0.5 tablets (25 mg total) by mouth daily. Take 25 mg by mouth daily. 45 tablet 0     atorvastatin (LIPITOR) 10 MG tablet Take 1 tablet (10 mg total) by mouth bedtime. 90 tablet 3     calcium carbonate-simethicone (ANTACID ANTI-GAS) 1,000-60 mg Chew 750 mg. TAKE AS DIRECTED.        cholecalciferol, vitamin D3, (VITAMIN D3) 2,000 unit cap Take 1 capsule by mouth.       losartan (COZAAR) 100 MG tablet TAKE 1 TABLET (100 MG) BY ORAL ROUTE ONCE DAILY 90 tablet 3     calcium carbonate-vitamin D2 500 mg(1,250mg) -200 unit tablet Take 1 tablet by mouth 2 (two) times a day.       No current facility-administered medications for this visit.        Total data points: 3

## 2021-06-10 NOTE — PROGRESS NOTES
Chief Complaint   Patient presents with     Blood Pressure Check       Vitals:    05/09/17 0908 05/09/17 0917   BP: 174/70 162/66   Patient Site: Right Arm Right Arm   Patient Position: Sitting Sitting   Cuff Size: Adult Small Adult Small     Pt states she's feeling fine no signs of imbalance/ headache/ or anything unusual. She's been taking her BP and recoding all reading numbers at home.   Dr. Savana Hagen notified and ok to discharge pt. Pt also informed make an OV BP F/U in 2 months feel free to call/ get in sooner if BP elevated.     Tessa Guardado, [facility] clinic 5/9/2017 10:00 AM

## 2021-06-11 NOTE — PROGRESS NOTES
"Martha Chamorro is a 85 y.o. female who is being evaluated via a billable telephone visit.      The patient has been notified of following:     \"This telephone visit will be conducted via a call between you and your physician/provider. We have found that certain health care needs can be provided without the need for a physical exam.  This service lets us provide the care you need with a short phone conversation.  If a prescription is necessary we can send it directly to your pharmacy.  If lab work is needed we can place an order for that and you can then stop by our lab to have the test done at a later time.    Telephone visits are billed at different rates depending on your insurance coverage. During this emergency period, for some insurers they may be billed the same as an in-person visit.  Please reach out to your insurance provider with any questions.    If during the course of the call the physician/provider feels a telephone visit is not appropriate, you will not be charged for this service.\"    Patient has given verbal consent to a Telephone visit? Yes    What phone number would you like to be contacted at? home    Patient would like to receive their AVS by AVS Preference: Mail a copy.        Assessment/Plan:  1. Essential hypertension  Stable. Medication refilled today.  - losartan (COZAAR) 100 MG tablet; Take 1 tablet (100 mg total) by mouth daily.  Dispense: 90 tablet; Refill: 3  - atenoloL (TENORMIN) 25 MG tablet; Take 1 tablet (25 mg total) by mouth daily.  Dispense: 90 tablet; Refill: 3  - amLODIPine (NORVASC) 5 MG tablet; TAKE 1 TABLET (5 MG) BY MOUTH DAILY.  Dispense: 90 tablet; Refill: 3  - HM2(CBC w/o Differential); Future  - Lipid Profile; Future  - Comprehensive Metabolic Panel; Future    2. Hypercholesterolemia  Stable, she will come for fasting labs.  - atorvastatin (LIPITOR) 10 MG tablet; Take 1 tablet (10 mg total) by mouth at bedtime.  Dispense: 90 tablet; Refill: 3  - Lipid Profile; " Future    3. Hyperlipidemia, unspecified hyperlipidemia type      4. Acute bilateral low back pain with right-sided sciatica  Right sided low back pain. Going on x 1 week, states that she may have been working too much at home. On and off pain. No pain at the time of call. Also reports on and off tingling on both feet, but not present right now. Mostly experienced at night.Pain radiates from lower back to hip and knee. Diagnosed with arthritis a year ago by [facility].   States she's taken Aleve, has used ice/hot packs and warm showers, not staying in one position for a long period of time. These measures have helped with pain relief.  We discussed switching to Tylenol and OTC topical meds. Symptoms of radiculopathy are better and she denies incontinence, weakness, falls. Her daughter is PT and she is doing some exercise per her recommendations. If not better in 2 weeks, she will need to be seen.        Phone call duration:  15 minutes    Kelli Morales MA

## 2021-06-11 NOTE — TELEPHONE ENCOUNTER
Martha calls with 2 concerns:  1) Medication refill appointment - phone visit needed for refills. She states that she is almost out of losartan (1 week supply left).    2) back pain - located on right lower back. Going on x 1 week, states that she may have been working too much at home. On and off pain. No pain at the time of call. Also reports on and off tingling on both feet, but not present right now. Mostly experienced at night.    Pain radiates from lower back to hip and knee. Diagnosed with arthritis a year ago by [facility].    States she's taken Aleve, has used ice/hot packs and warm showers, not staying in one position for a long period of time. These measures have helped with pain relief.    PCP Joshua ZUÑIGA    Disposition: Phone visit within 3 days. Care advice reviewed and call back for further concerns. She verbalized understanding.    Dee Borwn RN/[place] Nurse Advisor        Reason for Disposition    Patient wants to be seen    Additional Information    Negative: Passed out (i.e., fainted, collapsed and was not responding)    Negative: Shock suspected (e.g., cold/pale/clammy skin, too weak to stand, low BP, rapid pulse)    Negative: Sounds like a life-threatening emergency to the triager    Negative: SEVERE back pain of sudden onset and age > 60    Negative: SEVERE abdominal pain (e.g., excruciating)    Negative: Abdominal pain and age > 60    Negative: Unable to urinate (or only a few drops) and bladder feels very full    Negative: Loss of bladder or bowel control (urine or bowel incontinence; wetting self, leaking stool) of new onset    Negative: Numbness (loss of sensation) in groin or rectal area    Negative: Pain radiates into groin, scrotum    Negative: Blood in urine (red, pink, or tea-colored)    Negative: Vomiting and pain over lower ribs of back (i.e., flank - kidney area)    Negative: Weakness of a leg or foot (e.g., unable to bear weight, dragging foot)    Negative: Patient sounds  very sick or weak to the triager    Negative: Pain or burning with passing urine (urination)    Negative: Fever > 100.5 F (38.1 C) and flank pain    Negative: SEVERE back pain (e.g., excruciating, unable to do any normal activities) and not improved after pain medicine and CARE ADVICE    Negative: Numbness in an arm or hand (i.e., loss of sensation) and upper back pain    Negative: Numbness in a leg or foot (i.e., loss of sensation)    Negative: High-risk adult (e.g., history of cancer, history of HIV, or history of IV drug abuse)    Negative: Painful rash with multiple small blisters grouped together (i.e., dermatomal distribution or 'band' or 'stripe')    Negative: Pain radiates into the thigh or further down the leg, and in both legs    Negative: Age > 50 and no history of prior similar back pain    Negative: MODERATE back pain (e.g., interferes with normal activities) and present > 3 days    Negative: Pain radiates into the thigh or further down the leg    Protocols used: BACK PAIN-A-[place]

## 2021-06-12 NOTE — PATIENT INSTRUCTIONS - HE
Continue on current medications.  Do take your blood pressure medications on the day of your back procedure tomorrow.    If blood pressure is running above 150/90, contact me before your appointment in December.    See me at 7 AM on December 7 in clinic.    Eat regular meals, avoid excessive water drinking.    You can request a referral to the [facility] if you need further assistance with your back pain issue.    Avoid oxycodone and narcotics.  Okay for Tylenol 3 times a day.  Do not take ibuprofen or Aleve, as that can affect blood pressure and kidney function.

## 2021-06-12 NOTE — PROGRESS NOTES
ASSESSMENT:   Skin lesions consistent with bug bites - favor mosquito bites over bed bugs, flees, etc.  Will treat and instructed patient to avoid itching. Not consistent with urticaria.     PLAN:  Benadryl - 25mg up to three times a day.  If side effects - take Claritin (loratidine) 10mg once daily.   Triamcinolone - apply to bites twice a day until healed     Mirian New MD    SUBJECTIVE:   Martha Chamorro is a 82 y.o. female presents today, with concerns of bites.  She noted them on Wednesday and then they progressively have gotten worse since them.  She has been using after-bite cream and has been using cortisol cream as well for itching.  She also does ice and soda baths to help with the bites.  The areas itch severely and she notes that when she itches them they get worse.      She notes that these bite areas will come and persist. They do not seem to come and go.  She is dog sitting.  She has been taking benadryl at night - 25mg.  She has not had any noticeable side effects from the medication.  She is not taking any other antihistamines.  She has been trimming bushes and doing lawn work and this is when things started.      These started before she was taking care of the dogs.  She has not had allergies in the past and no people at home with similar contacts.      Patient Active Problem List   Diagnosis     Hyperlipemia     Anxiety     Essential Hypertension     Insomnia     Osteoporosis       History   Smoking Status     Former Smoker   Smokeless Tobacco     Not on file       Current Medications:  Current Outpatient Prescriptions on File Prior to Visit   Medication Sig Dispense Refill     alendronate (FOSAMAX) 70 MG tablet TAKE ONE TABLET BY MOUTH ONCE WEEKLY. TAKE 1ST THING IN THE AM WITH WATER. DO NOT EAT/DRINK/LIE DOWN FOR 30 MINS 12 tablet 0     alendronate (FOSAMAX) 70 MG tablet TAKE ONE TABLET BY MOUTH ONCE WEEKLY. TAKE 1ST THING IN THE AM WITH WATER. DO NOT EAT/DRINK/LIE DOWN FOR 30 MINS 12 tablet 3      amLODIPine (NORVASC) 5 MG tablet TAKE 1 TABLET (5 MG TOTAL) BY MOUTH DAILY. 90 tablet 3     atenolol (TENORMIN) 50 MG tablet Take 0.5 tablets (25 mg total) by mouth daily. Take 25 mg by mouth daily. 45 tablet 0     atorvastatin (LIPITOR) 10 MG tablet Take 1 tablet (10 mg total) by mouth bedtime. 90 tablet 3     calcium carbonate-simethicone (ANTACID ANTI-GAS) 1,000-60 mg Chew 750 mg. TAKE AS DIRECTED.        calcium carbonate-vitamin D2 500 mg(1,250mg) -200 unit tablet Take 1 tablet by mouth 2 (two) times a day.       cholecalciferol, vitamin D3, (VITAMIN D3) 2,000 unit cap Take 1 capsule by mouth.       losartan (COZAAR) 100 MG tablet TAKE 1 TABLET (100 MG) BY ORAL ROUTE ONCE DAILY 90 tablet 3     No current facility-administered medications on file prior to visit.        Allergies:   Allergies   Allergen Reactions     Fosinopril Cough       OBJECTIVE:   Vitals:    07/22/17 1044   BP: 156/80   Patient Site: Right Arm   Patient Position: Sitting   Cuff Size: Adult Regular   Pulse: 71   Resp: 14   Temp: 98.2  F (36.8  C)   TempSrc: Oral   SpO2: 99%   Weight: 114 lb (51.7 kg)     Physical exam reveals a 82 y.o. female.   Appears healthy, alert and cooperative.  Skin: Multiple areas of erythema of various size with central raised skin (0.5 to 1cm in diameter) and excoriation centrally.  Consistent with bites on chest, upper back, right arm and lower back - appx 15 lesion total.

## 2021-06-12 NOTE — TELEPHONE ENCOUNTER
She could sure try some Tylenol for her arthritis. Tylenol 650mg up to three times daily as needed.

## 2021-06-12 NOTE — TELEPHONE ENCOUNTER
Patient has not seen me in over a year.    Patient was recently in the emergency room and had a low sodium 129.  It was not stated specifically what was requested for lab follow-up.    Patient needs clinic follow-up.  Have patient schedule a clinic or virtual appointment with available provider later this week or early next week.  Patient will need to schedule a virtual appointment in order to discuss the lab work from tomorrow.    I will have patient recheck a sodium level, with basic metabolic panel checked.  Asked patient if she was told to check any other labs.    Patient also needs a follow-up appointment with me this fall.  Schedule a long video-virtual visit or long clinic visit with me as well.

## 2021-06-12 NOTE — TELEPHONE ENCOUNTER
I spoke with patient and advised need for visit to discuss results from lab work done today.  I also advised another longer appointment needed in the next month or so with Dr. Lewis to discuss other issues.    Patient is agreeable and states she will have her daughter call back to schedule as she will need to be with her. I gave number [phone] for daughter to call.  JENNIFER Henson, [facility]

## 2021-06-12 NOTE — PROGRESS NOTES
Assessment:      Healthy female exam.      Plan:      Diagnoses and all orders for this visit:    Routine general medical examination at a health care facility  She hasn't been updated her colon cancer screening and I think that's reasonable.  She will consider getting a mammogram this year.    Osteoporosis  We'll repeat her dexa next year.  -     alendronate (FOSAMAX) 70 MG tablet; TAKE ONE TABLET BY MOUTH ONCE WEEKLY. TAKE 1ST THING IN THE AM WITH WATER. DO NOT EAT/DRINK/LIE DOWN FOR 30 MINS  Dispense: 12 tablet; Refill: 0  -     Vitamin D, Total (25-Hydroxy); Future    Essential hypertension  She runs higher in the office, but her home numbers are excellent.  Her home SBP was in the 150s today, so it seems to be accurate.  She could feel herself getting anxious.  -     losartan (COZAAR) 100 MG tablet; Take 1 tablet (100 mg total) by mouth daily.  Dispense: 90 tablet; Refill: 3  -     Comprehensive Metabolic Panel; Future    Neoplasm of uncertain behavior of skin  -     Ambulatory referral to Dermatology    Hyperlipemia  -     Lipid Cascade; Future  -     Comprehensive Metabolic Panel; Future      Patient Instructions   Schedule an appointment with a dermatologist. [facility] has seen significant improvement in appointment availability, give them a call at [phone].     Please set up a lab appt on your way out.  The labs will be available on stylemarks.  If you aren't signed up, then we'll mail them out.  If anything needs more immediate follow up, we'll also call.    Take care!          Subjective:      Martha Chamorro is a 82 y.o. female who presents for an annual exam.  She is doing well.  She recently had a large number of mosquito bites and was seen in [facility] for that.  She was rx'd a steroid cream and that's slowly helped.  She has a spot on her left ear that continues to crust over.  It's a rough bump.    Colonoscopy: 2006  Last Dexa: 5/23/16  Last mammogram: 2/19/15    Gynecologic History  No  LMP recorded. Patient has had a hysterectomy.  Contraception: status post hysterectomy  Last Pap: n/a    Current Outpatient Prescriptions   Medication Sig Dispense Refill     alendronate (FOSAMAX) 70 MG tablet TAKE ONE TABLET BY MOUTH ONCE WEEKLY. TAKE 1ST THING IN THE AM WITH WATER. DO NOT EAT/DRINK/LIE DOWN FOR 30 MINS 12 tablet 3     alendronate (FOSAMAX) 70 MG tablet TAKE ONE TABLET BY MOUTH ONCE WEEKLY. TAKE 1ST THING IN THE AM WITH WATER. DO NOT EAT/DRINK/LIE DOWN FOR 30 MINS 12 tablet 0     amLODIPine (NORVASC) 5 MG tablet TAKE 1 TABLET (5 MG TOTAL) BY MOUTH DAILY. 90 tablet 3     atenolol (TENORMIN) 50 MG tablet Take 0.5 tablets (25 mg total) by mouth daily. Take 25 mg by mouth daily. 45 tablet 0     atorvastatin (LIPITOR) 10 MG tablet Take 1 tablet (10 mg total) by mouth bedtime. 90 tablet 3     calcium carbonate-simethicone (ANTACID ANTI-GAS) 1,000-60 mg Chew 750 mg. TAKE AS DIRECTED.        calcium carbonate-vitamin D2 500 mg(1,250mg) -200 unit tablet Take 1 tablet by mouth 2 (two) times a day.       cholecalciferol, vitamin D3, (VITAMIN D3) 2,000 unit cap Take 1 capsule by mouth.       losartan (COZAAR) 100 MG tablet Take 1 tablet (100 mg total) by mouth daily. 90 tablet 3     triamcinolone (KENALOG) 0.1 % cream Apply to affected areas twice daily. 30 g 0     No current facility-administered medications for this visit.      No past medical history on file.  Past Surgical History:   Procedure Laterality Date     HYSTERECTOMY  [id number]     OOPHORECTOMY  [id number]     Fosinopril  Family History   Problem Relation Age of Onset     Skin cancer Father      Stomach cancer Maternal Grandmother      Social History     Social History     Marital status:      Spouse name: N/A     Number of children: N/A     Years of education: N/A     Occupational History     Not on file.     Social History Main Topics     Smoking status: Former Smoker     Smokeless tobacco: Not on file     Alcohol use Not on file     Drug  "use: Not on file     Sexual activity: Not on file     Other Topics Concern     Not on file     Social History Narrative     Review of Systems  General:  no concerns  Eyes: no concerns  Ears/Nose/Throat: no concerns  Cardiovascular:home BP is really well controlled  Respiratory:  hoarseness  Gastrointestinal:  no concerns, Genitourinary: no concerns  Musculoskeletal:  no concerns  Skin: no concerns  Neurologic: no concerns  Psychiatric: nervousness  Endocrine: no concerns  Heme/Lymphatic: no concerns   Allergic/Immunologic: no concerns      Objective:         Vitals:    08/03/17 1032   BP: 144/82   Pulse: 80   Weight: 114 lb 3 oz (51.8 kg)   Height: 5' 2\" (1.575 m)       Vitals:  Vitals:    08/03/17 1032   BP: 144/82   Patient Site: Right Arm   Pulse: 80   Weight: 114 lb 3 oz (51.8 kg)   Height: 5' 2\" (1.575 m)     Wt Readings from Last 3 Encounters:   08/03/17 114 lb 3 oz (51.8 kg)   07/22/17 114 lb (51.7 kg)   03/15/17 115 lb 5 oz (52.3 kg)     Body mass index is 20.89 kg/(m^2).    Physical Exam:  General Appearance: pleasant adult female, awake, alert, no acute distress  HEENT: pupils are equal, round and reactive to light, TMs normal, oropharynx clear  Neck: supple, no thyromegaly, no carotid bruits  Heart: rrr, no m/r/g  Lungs: Clear to auscultation bilaterally  Breast exam:  Normal skin overlying her breasts bilaterally no discrete nodule is palpable in the axilla bilaterally  Abdomen: s/nt/nd, no hsm  Pelvic:Not examined  Extremities: no edema or lesions  Skin: Skin color, texture, turgor normal, top of external pinna left ear--raised bump w/ flaking skin over it  Neurologic: Normal      "

## 2021-06-12 NOTE — TELEPHONE ENCOUNTER
Patient Returning Call  Reason for call:  Missed call from clinic  Information relayed to patient:  Message from Philip Martínez MD sent at 10/2/2020  3:41 PM CDT -----  Call the pt. All results are good. She can continue same medications. Help to set up f/u visit with her PCP in 3-6 months.  Patient has additional questions:  Yes  If YES, what are your questions/concerns:  Please mail a copy of her lab results.  She likes to keep a record of them at home.  Okay to leave a detailed message?: No call back needed

## 2021-06-12 NOTE — TELEPHONE ENCOUNTER
----- Message from Philip Martínez MD sent at 10/2/2020  3:41 PM CDT -----  Call the pt. All results are good. She can continue same medications. Help to set up f/u visit with her PCP in 3-6 months.

## 2021-06-12 NOTE — PROGRESS NOTES
"Martha Chamorro is a 85 y.o. female who is being evaluated via a billable telephone visit.      The patient has been notified of following:     \"This telephone visit will be conducted via a call between you and your physician/provider. We have found that certain health care needs can be provided without the need for a physical exam.  This service lets us provide the care you need with a short phone conversation.  If a prescription is necessary we can send it directly to your pharmacy.  If lab work is needed we can place an order for that and you can then stop by our lab to have the test done at a later time.    Telephone visits are billed at different rates depending on your insurance coverage. During this emergency period, for some insurers they may be billed the same as an in-person visit.  Please reach out to your insurance provider with any questions.    If during the course of the call the physician/provider feels a telephone visit is not appropriate, you will not be charged for this service.\"    Patient has given verbal consent to a Telephone visit? Yes    What phone number would you like to be contacted at? [phone]    Patient would like to receive their AVS by AVS Preference: Mail a copy.    Additional provider notes:    [facility] clinic Follow Up Note    Martha Chamorro   85 y.o. female    Date of Visit: 11/2/2020    Chief Complaint   Patient presents with     Follow-up     Routine checkup. Discuss recent spine issues.     Subjective  Martha and daughter, Alrfedo, requested a virtual visit to avoid clinic visit during the coronavirus outbreak.    Patient had establish care with me back in September of last year, had not been seen since.  Had a virtual visit with Dr. BROWN last month.    I did review labs from September including normal liver tests and kidney labs.  LDL cholesterol 104 and HDL 65 on Lipitor.    Patient's hypertension has been controlled although variable.  History of whitecoat " hypertension.    Blood pressure had been running in the 130s to 150s over 80s previously.    Patient has not checked her blood pressure recently but has been running higher with increase of back pain.    Patient has a history of chronic low back pain and left leg radicular pain.    Patient had exacerbation of this back pain but now presenting with right leg radicular pain associated with a disc extrusion at L2-3 with probable mass-effect on the right L3 nerve root.    Patient was initially given tramadol for pain relief.    She had nausea and was not eating well, drinking fluids and presented to the emergency room on October 21.  The sodium was 128 with a creatinine of 0.5.    Patient was taken off tramadol and put on oxycodone, take just 1 at night.    I did review the lab work from October 29 with sodium improved to 134.  Nausea better eating some better.  Trying to reduce fluids.  Creatinine 0.61.  Blood sugar normal at 97 and normal hemoglobin.    Patient has an epidural injection set up for tomorrow.    No new neurologic changes.  No increasing shortness of breath or chest pain reported.    Patient had stopped her atenolol on her own earlier but is now taking it.  She is on her usual amlodipine 5 mg a day, atenolol 25 mg a day and losartan 100 mg a day.        PMHx:  No past medical history on file.  PSHx:    Past Surgical History:   Procedure Laterality Date     HYSTERECTOMY  [id number]     OOPHORECTOMY  [id number]     Immunizations:   Immunization History   Administered Date(s) Administered     DT (pediatric) 10/08/2002     Influenza high dose,seasonal,PF, 65+ yrs 09/15/2015, 11/10/2016, 10/10/2017     Influenza, Seasonal, Inj PF IIV3 09/21/2010, 10/17/2012     Influenza, inj, historic,unspecified 11/08/2013, 09/25/2014     Influenza, seasonal,quad inj 6-35 mos 09/23/2009     Pneumo Conj 13-V (2010&after) 06/22/2016     Pneumo Polysac 23-V 10/08/2002     Td,adult,historic,unspecified 10/08/2002     Tdap  06/22/2016       ROS A comprehensive review of systems was performed and was otherwise negative    Medications, allergies, and problem list were reviewed and updated    Exam  There were no vitals taken for this visit.  Phone consult    Assessment/Plan  1. Essential hypertension  Blood pressure reported borderline high recently, but better controlled previous prior to the pain.    I encouraged patient to continue to follow blood pressure closely and contact me if running above 150/90.  Consider increase of amlodipine if needed to 10 mg a day.    Avoid HCTZ or diuretic with low sodium history.    Address pain through the [facility] orthopedic clinic.    2. Hyponatremia  Patient was told to make sure she is eating regular meals and avoid excess water intake.    Tramadol may have been contributing to hyponatremia affect, and is now taken off tramadol.    3. Lumbar radiculopathy  Epidural injection planned for tomorrow.  Significant right leg radicular pain associated with herniated disc and right L3 nerve root compression.  Patient was offered referral to the [place] [facility] but she declined.  She will manage this through the [facility].  I encouraged her to avoid narcotics in order to avoid covering up pain that may be causing nerve damage, and also to avoid confusion and fall risk.    Tylenol as needed.  Address pain with injections as above and may need to consider surgical intervention if pain is not improving, given the significant herniation of the disc.    Avoiding NSAIDs with her hypertension medications.    Tylenol 3 times a day.    She had done physical therapy but did not have significant improvement, with the herniated disc issue.  Patient's daughter is a physical therapist.    Status post hysterectomy with BSO.    Last colonoscopy in 2006 -.    Osteoporosis, quit Fosamax September of last year, there was no new compression fracture noted on MRI.  She had had previously 5 years of  Fosamax.    Consider repeat DEXA scan next year.    Continues on Lipitor at this time but does not have a history of vascular disease.  If increasing diffuse muscle achiness or weakness, could consider discontinuation of Lipitor.    Patient was scheduled for a clinic appointment on December 7 with me.    Return in 5 weeks (on 12/7/2020) for Recheck.   Patient Instructions   Continue on current medications.  Do take your blood pressure medications on the day of your back procedure tomorrow.    If blood pressure is running above 150/90, contact me before your appointment in December.    See me at 7 AM on December 7 in clinic.    Eat regular meals, avoid excessive water drinking.    You can request a referral to the [facility] if you need further assistance with your back pain issue.    Avoid oxycodone and narcotics.  Okay for Tylenol 3 times a day.  Do not take ibuprofen or Aleve, as that can affect blood pressure and kidney function.    Raul Lewis MD        Current Outpatient Medications   Medication Sig Dispense Refill     amLODIPine (NORVASC) 5 MG tablet TAKE 1 TABLET (5 MG) BY MOUTH DAILY. 90 tablet 3     atenoloL (TENORMIN) 25 MG tablet Take 1 tablet (25 mg total) by mouth daily. 90 tablet 3     atorvastatin (LIPITOR) 10 MG tablet Take 1 tablet (10 mg total) by mouth at bedtime. 90 tablet 3     calcium carbonate-simethicone (ANTACID ANTI-GAS) 1,000-60 mg Chew 750 mg. TAKE AS DIRECTED.        calcium carbonate-vitamin D2 500 mg(1,250mg) -200 unit tablet Take 1 tablet by mouth daily.              cholecalciferol, vitamin D3, (VITAMIN D3) 2,000 unit cap Take 1 capsule by mouth daily.        losartan (COZAAR) 100 MG tablet Take 1 tablet (100 mg total) by mouth daily. 90 tablet 3     oxyCODONE (ROXICODONE) 5 MG immediate release tablet TK ONE T PO Q 4 H PRN P       senna-docusate (SENNA-S) 8.6-50 mg tablet Take 1 tablet by mouth daily as needed for constipation.       No current facility-administered  medications for this visit.      Allergies   Allergen Reactions     Fosinopril Cough     Social History     Tobacco Use     Smoking status: Former Smoker     Packs/day: 0.00     Smokeless tobacco: Never Used   Substance Use Topics     Alcohol use: Yes     Comment: occasional     Drug use: Not on file           Phone call duration:  11 minutes    Aleyda Grier, [facility]

## 2021-06-12 NOTE — TELEPHONE ENCOUNTER
Left voicemail for patient to return call to clinic. When patient returns call, please give them below message.    Kasandra Barber CMA ............... 2:46 PM, 10/28/20

## 2021-06-12 NOTE — TELEPHONE ENCOUNTER
Daughter, Alfredo, calling to find out if a medication can be ordered for pt's arthritis pain.  Alfredo would like this addressed today, she states pt called her PCP clinic twice this week with no response.      Disposition:  See a provider within 24 hours, in-person advised if no COVID symptoms.  Alfredo verbalized her understanding and is requesting a virtual visit.  Call transferred to  Central Scheduling to make a virtual  appt for today.      COVID 19 Nurse Triage Plan/Patient Instructions    Please be aware that novel coronavirus (COVID-19) may be circulating in the community. If you develop symptoms such as fever, cough, or SOB or if you have concerns about the presence of another infection including coronavirus (COVID-19), please contact your health care provider or visit [email or web address].     Disposition/Instructions    See a provider within 24 hours recommended.     Thank you for taking steps to prevent the spread of this virus.  o Limit your contact with others.  o Wear a simple mask to cover your cough.  o Wash your hands well and often.    Resources    [facility]: About COVID-19: [email or web address]    CDC: What to Do If You're Sick: www.cdc.gov/coronavirus/2019-ncov/about/steps-when-sick.html    CDC: Ending Home Isolation: www.cdc.gov/coronavirus/2019-ncov/hcp/disposition-in-home-patients.html     CDC: Caring for Someone: www.cdc.gov/coronavirus/2019-ncov/if-you-are-sick/care-for-someone.html     [facility]: Interim Guidance for Hospital Discharge to Home: www.health.[facility].mn.us/diseases/coronavirus/hcp/hospdischarge.pdf    [facility] clinical trials (COVID-19 research studies): clinicalaffairs.[facility]/umn-clinical-trials     Below are the COVID-19 hotlines at the [facility] of [facility] ([facility]). Interpreters are available.   o For health questions: Call 549-904-1270 or 1-896.771.5397 (7 a.m. to 7 p.m.)  o For questions about schools and childcare: Call 567-946-1969 or  "9-954-294-5815 (7 a.m. to 7 p.m.)       Vilma Lwory RN, [facility]    Reason for Disposition    Caller requesting a NON-URGENT new prescription or refill and triager unable to refill per unit policy    Additional Information    Negative: MORE THAN A DOUBLE DOSE of a prescription or over-the-counter (OTC) drug    Negative: [1] DOUBLE DOSE (an extra dose or lesser amount) of over-the-counter (OTC) drug AND [2] any symptoms (e.g., dizziness, nausea, pain, sleepiness)    Negative: [1] DOUBLE DOSE (an extra dose or lesser amount) of prescription drug AND [2] any symptoms (e.g., dizziness, nausea, pain, sleepiness)    Negative: Took another person's prescription drug    Negative: [1] DOUBLE DOSE (an extra dose or lesser amount) of prescription drug AND [2] NO symptoms (Exception: a double dose of antibiotics)    Negative: Diabetes drug error or overdose (e.g., insulin or extra dose)    Negative: [1] Request for URGENT new prescription or refill of \"essential\" medication (i.e., likelihood of harm to patient if not taken) AND [2] triager unable to fill per unit policy    Negative: [1] Prescription not at pharmacy AND [2] was prescribed today by PCP    Negative: Pharmacy calling with prescription questions and triager unable to answer question    Negative: Caller has URGENT medication question about med that PCP prescribed and triager unable to answer question    Negative: Caller has NON-URGENT medication question about med that PCP prescribed and triager unable to answer question    Protocols used: MEDICATION QUESTION CALL-A-AH      "

## 2021-06-12 NOTE — TELEPHONE ENCOUNTER
Left a message for the patient to call back. Please relay the below clinician message and assist with scheduling when the patient returns this call. Thanks.

## 2021-06-12 NOTE — TELEPHONE ENCOUNTER
Spoke to the daughter to inform her of the below clinician message but was informed that the patient already had an e-visit yesterday and has started the Medrol-Dosepak that was prescribed by Dr. Hall. They did not have further concerns at this time.

## 2021-06-13 NOTE — PATIENT INSTRUCTIONS - HE
Continue current medications at this time.    If you are having blood pressures less than 120/60, or lightheaded dizzy spells, go back to 5 mg a day amlodipine.    Follow-up with me for a blood pressure checkup appointment in the spring, in April or May.    Keep walking and stretching every day for your back.  Do not take any ibuprofen or Aleve.  Okay for Tylenol, as you are doing.

## 2021-06-13 NOTE — PROGRESS NOTES
"Martha Chamorro is a 85 y.o. female who is being evaluated via a billable telephone visit.      The patient has been notified of following:     \"This telephone visit will be conducted via a call between you and your physician/provider. We have found that certain health care needs can be provided without the need for a physical exam.  This service lets us provide the care you need with a short phone conversation.  If a prescription is necessary we can send it directly to your pharmacy.  If lab work is needed we can place an order for that and you can then stop by our lab to have the test done at a later time.    Telephone visits are billed at different rates depending on your insurance coverage. During this emergency period, for some insurers they may be billed the same as an in-person visit.  Please reach out to your insurance provider with any questions.    If during the course of the call the physician/provider feels a telephone visit is not appropriate, you will not be charged for this service.\"    Patient has given verbal consent to a Telephone visit? Yes    What phone number would you like to be contacted at? [phone]     Patient would like to receive their AVS by AVS Preference: Mail a copy.    Additional provider notes:     [facility] clinic Follow Up Note    Martha Chamorro   85 y.o. female    Date of Visit: 12/16/2020    Chief Complaint   Patient presents with     Hypertension     Blood pressure follow-up. BP's of 120's/60's this month since last visit     Subjective  Martha was scheduled for a phone virtual visit today to follow-up on her hypertension.    On December 7 in clinic her blood pressure was high at 172/78.  She was anxious on that day and was concerned about whitecoat hypertension.    She is compliant with losartan 100 mg a day and was on 5 mg a day of amlodipine.    On December 7 I had her increase the amlodipine to 10 mg a day.    She is also walking and more active in the " house.    She had a lumbar disc herniation L2-3 affecting her right L3 nerve root.  She had a cortisone shot on November 3 with 100% improvement.  She still has some tightness to her back especially in the morning, but has been trying to be more active and walking daily in her house.  Just taking Tylenol twice a day now.  No longer on Aleve.    I did review labs from December 7 with a normal creatinine of 0.61.  Normal sodium and potassium.    She has a distant history of low sodium, not on diuretic.    No increasing lower extremity edema.    Patient is eating better.  Less anxiety.    Still on atorvastatin.    Recently her blood pressure was 120 over 60s.  She had a low of 117 over 60s.      PMHx:  No past medical history on file.  PSHx:    Past Surgical History:   Procedure Laterality Date     HYSTERECTOMY  [id number]     OOPHORECTOMY  [id number]     Immunizations:   Immunization History   Administered Date(s) Administered     DT (pediatric) 10/08/2002     Influenza high dose,seasonal,PF, 65+ yrs 09/15/2015, 11/10/2016, 10/10/2017     Influenza, Seasonal, Inj PF IIV3 09/21/2010, 10/17/2012     Influenza, inj, historic,unspecified 11/08/2013, 09/25/2014     Influenza, seasonal,quad inj 6-35 mos 09/23/2009     Influenza,quad,high Dose,PF, 65yr + 12/07/2020     Pneumo Conj 13-V (2010&after) 06/22/2016     Pneumo Polysac 23-V 10/08/2002     Td,adult,historic,unspecified 10/08/2002     Tdap 06/22/2016       ROS A comprehensive review of systems was performed and was otherwise negative    Medications, allergies, and problem list were reviewed and updated    Exam  There were no vitals taken for this visit.  Phone consult    Assessment/Plan  1. Essential hypertension  Significant improvement in blood pressure.  She denies orthostasis.  Some borderline low blood pressures.  She was told of lower blood pressures or lightheaded dizzy develop, go back to 5 mg a day of amlodipine.    Continue losartan 100 mg a day and atenolol  25 mg a day.  Blood    She likely was having some whitecoat hypertension in the clinic with anxiety earlier this month.  Follow-up with me in the spring.    2. Chronic bilateral low back pain, unspecified whether sciatica present  Improved after cortisone shot last month.  Continue Tylenol twice daily.  No further NSAID use.    If worsening general myalgias, could consider continuation of the atorvastatin given age and lack of active vascular disease.    Osteoporosis, on Fosamax since last year.  Plan DEXA scan next year.  Continue calcium and vitamin D.    Return in about 5 months (around 5/16/2021) for Recheck.   Patient Instructions   Continue current medications at this time.    If you are having blood pressures less than 120/60, or lightheaded dizzy spells, go back to 5 mg a day amlodipine.    Follow-up with me for a blood pressure checkup appointment in the spring, in April or May.    Keep walking and stretching every day for your back.  Do not take any ibuprofen or Aleve.  Okay for Tylenol, as you are doing.    Raul Lewis MD        Current Outpatient Medications   Medication Sig Dispense Refill     amLODIPine (NORVASC) 10 MG tablet Take 1 tablet (10 mg total) by mouth daily. 90 tablet 3     atenoloL (TENORMIN) 25 MG tablet Take 1 tablet (25 mg total) by mouth daily. 90 tablet 3     atorvastatin (LIPITOR) 10 MG tablet Take 1 tablet (10 mg total) by mouth at bedtime. 90 tablet 3     calcium carbonate-simethicone (ANTACID ANTI-GAS) 1,000-60 mg Chew 750 mg. TAKE AS DIRECTED.        calcium carbonate-vitamin D2 500 mg(1,250mg) -200 unit tablet Take 1 tablet by mouth daily.              cholecalciferol, vitamin D3, (VITAMIN D3) 2,000 unit cap Take 1 capsule by mouth daily.        losartan (COZAAR) 100 MG tablet Take 1 tablet (100 mg total) by mouth daily. 90 tablet 3     No current facility-administered medications for this visit.      Allergies   Allergen Reactions     Fosinopril Cough     Social History      Tobacco Use     Smoking status: Former Smoker     Packs/day: 0.00     Smokeless tobacco: Never Used   Substance Use Topics     Alcohol use: Yes     Comment: occasional     Drug use: Not on file           Phone call duration:  11 minutes    Aleyda Grier CMA

## 2021-06-13 NOTE — PATIENT INSTRUCTIONS - HE
Increase amlodipine to 10 mg a day.    Schedule a virtual appointment with me Wednesday, December 16.  That you take, we will discuss them on your virtual visit.    If you develop significant leg swelling, or lightheaded dizzy spells, or systolic blood pressures remained above 160, contact me before your appointment.    Walk on a daily basis, at least in your house.  Record blood pressures

## 2021-06-13 NOTE — PROGRESS NOTES
[facility] clinic Follow Up Note    Martha Chamorro   85 y.o. female    Date of Visit: 12/7/2020    Chief Complaint   Patient presents with     Follow-up     Subjective  Martha is an 85-year-old female living independently here for a blood pressure checkup.    Her blood pressure has been running 130-150/70s previous this year.    She is currently on amlodipine 5 mg a day, atenolol 25 mg a day and losartan 100 mg a day.    Patient was having reduced mobility with a disc herniation at L2-3 with a right L3 nerve root radicular pain but she had an epidural shot on November 3 with 100% improvement.  Now just intermittent tingling paresthesias and some tightness to her back and right hip area.    Takes Tylenol 3 times a day.  No longer takes Aleve.    Last saw [facility] on November 24, I did review that note today.    She is ambulating with walker in her house.  No recent falls.    Her blood pressure is recently becoming higher.  This morning her blood pressure was 164/75 at home.    Compliant with her medications and she took them this morning.    Denies any change in diet.  She is actually 100 pounds, and lighter than last year, she is a thin and frail.    She does admit to increased anxiety.  No new headaches or neurologic changes.    No chest pain or palpitations.    She had some slight increased edema after the cortisone injection last month, but that is resolved and no edema now.  She usually does not have edema.    I did review labs from October 2020 with a creatinine 0.6.    She had a low sodium earlier but sodium was up to 134 on October 29.  She is eating normal diet now.    She continues on Lipitor.  No generalized myalgia type complaints.    Test was hysterectomy with BSO.    Colonoscopy -2006 and no bowel complaints.    Osteoporosis but off Fosamax last year after 5 years of treatment.  No compression fractures noted on previous MRI.  On calcium and vitamin D.        PMHx:  No past medical  history on file.  PSHx:    Past Surgical History:   Procedure Laterality Date     HYSTERECTOMY  [id number]     OOPHORECTOMY  [id number]     Immunizations:   Immunization History   Administered Date(s) Administered     DT (pediatric) 10/08/2002     Influenza high dose,seasonal,PF, 65+ yrs 09/15/2015, 11/10/2016, 10/10/2017     Influenza, Seasonal, Inj PF IIV3 09/21/2010, 10/17/2012     Influenza, inj, historic,unspecified 11/08/2013, 09/25/2014     Influenza, seasonal,quad inj 6-35 mos 09/23/2009     Pneumo Conj 13-V (2010&after) 06/22/2016     Pneumo Polysac 23-V 10/08/2002     Td,adult,historic,unspecified 10/08/2002     Tdap 06/22/2016       ROS A comprehensive review of systems was performed and was otherwise negative    Medications, allergies, and problem list were reviewed and updated    Exam  /78 (Patient Site: Right Arm, Patient Position: Sitting, Cuff Size: Adult Small)   Pulse 76   Temp 97.2  F (36.2  C) (Other) Comment (Src): forehead  Wt 100 lb (45.4 kg)   BMI 18.89 kg/m    Blood pressure rechecked by me on the left arm was 180/70.  She appears well, alert and oriented.  Normal mentation.  Appears mildly anxious.  Pupils and irises equal and reactive.  Able to climb up on the exam table.  Gait within normal limits without foot drop.  No ankle edema.  Lungs are clear to auscultation with good respiratory excursion.  No JVD.  No carotid bruits.  Heart is regular without murmur rub or gallop.  Abdomen is thin and nontender.    Assessment/Plan  1. Essential hypertension  Some element of whitecoat hypertension but does appear to be still running high systolic blood pressures.    I suspect stiff arteries in this 85-year-old woman contributing to higher systolic spikes.    She denies any orthostasis.    Increase amlodipine.  I did discuss warnings of orthostasis or syncope risk also edema risk, fatigue risk.    I also discussed option to increase atenolol, but with risk of lower heart rate and  fatigue.    Continue on losartan 100 mg a day.  I did discuss possibly changing that to valsartan or other more potent ARB in the future.    Virtual follow-up visit in 1 to 2 weeks.  Plan follow-up for physical exam in the spring.    I encouraged her to increase regular daily walking.  - amLODIPine (NORVASC) 10 MG tablet; Take 1 tablet (10 mg total) by mouth daily.  Dispense: 90 tablet; Refill: 3    2. Lumbar disc herniation  Significant improvement after epidural injection.  Just occasional paresthesias and some back tightness now.  Encourage daily walking for patient with walker.    Tylenol 3 times daily.  No longer on NSAIDs.    3. Need for immunization against influenza  Given today  - Influenza,Quad,High Dose,PF 65 YR+    4. Encounter for therapeutic drug monitoring  History of hyponatremia  - Comprehensive Metabolic Panel    5. Osteoporosis, unspecified osteoporosis type, unspecified pathological fracture presence  On Fosamax since last year.  Plan repeat DEXA scan next year.  Continue calcium and vitamin D    6. Anxiety  She complains of anxiety with the current coronavirus outbreak.  I recommended she not consider any benzodiazepine, because of fall risk and confusion risk.  She was offered a referral to mental health services but she declined at this time.  She was encouraged to call back to request referral if she wishes.    Hypercholesterolemia, continue on atorvastatin at this time, does not have active vascular disease, has a high HDL, and given age if she has evidence of worsening muscle achiness or weakness, consider discontinuation.    Return in 9 days (on 12/16/2020) for Video virtual follow-up.   Patient Instructions   Increase amlodipine to 10 mg a day.    Schedule a virtual appointment with me Wednesday, December 16.  That you take, we will discuss them on your virtual visit.    If you develop significant leg swelling, or lightheaded dizzy spells, or systolic blood pressures remained above 160,  contact me before your appointment.    Walk on a daily basis, at least in your house.  Record blood pressures    Raul Lewis MD        Current Outpatient Medications   Medication Sig Dispense Refill     amLODIPine (NORVASC) 10 MG tablet Take 1 tablet (10 mg total) by mouth daily. 90 tablet 3     atenoloL (TENORMIN) 25 MG tablet Take 1 tablet (25 mg total) by mouth daily. 90 tablet 3     atorvastatin (LIPITOR) 10 MG tablet Take 1 tablet (10 mg total) by mouth at bedtime. 90 tablet 3     calcium carbonate-simethicone (ANTACID ANTI-GAS) 1,000-60 mg Chew 750 mg. TAKE AS DIRECTED.        calcium carbonate-vitamin D2 500 mg(1,250mg) -200 unit tablet Take 1 tablet by mouth daily.              cholecalciferol, vitamin D3, (VITAMIN D3) 2,000 unit cap Take 1 capsule by mouth daily.        losartan (COZAAR) 100 MG tablet Take 1 tablet (100 mg total) by mouth daily. 90 tablet 3     No current facility-administered medications for this visit.      Allergies   Allergen Reactions     Fosinopril Cough     Social History     Tobacco Use     Smoking status: Former Smoker     Packs/day: 0.00     Smokeless tobacco: Never Used   Substance Use Topics     Alcohol use: Yes     Comment: occasional     Drug use: Not on file

## 2021-06-16 NOTE — PROGRESS NOTES
Preoperative Exam    Scheduled Procedure: Cataract Surgery    Surgery Date:  3/6/18 & 3/13/18  Surgery Location: [facility]     Surgeon:  Dr. Thomas     Assessment/Plan:     1. Preop general physical exam  No contraindications for planned procedure.      2. Cataract of both eyes  She has had cataract procedures in the past before but was told at a follow up appointment that she had some scar tissue and that she would need a repeat procedure.     3. Hyperlipemia  Managed on Lipitor.  Recent lipid panel within normal limits.    4. Essential hypertension  There was some discussion about whether the patient was on the correct dose of atenolol.  The patient's medication list indicates that she is supposed to be taking 12.5 mg daily, however, the patient was under the impression that she should be taking 1 full 25 mg tablet every day, so that is what she has been doing.  She brings in a log of her blood pressures-they look excellent.  Her blood pressure is adequate today.  For these reasons we are going to have her continue using 25 mg atenolol daily.        Surgical Procedure Risk: Low (reported cardiac risk generally < 1%)  Have you had prior anesthesia?: Yes  Have you or any family members had a previous anesthesia reaction:  No  Do you or any family members have a history of a clotting or bleeding disorder?: No  Cardiac Risk Assessment: no increased risk for major cardiac complications    Patient approved for surgery with general or local anesthesia.        Functional Status: Independent  Patient plans to recover at home with family.     Subjective:      Martha Chamorro is a 82 y.o. female who presents for a preoperative consultation.      She is having repeat cataract surgery performed.  She had this procedure done initially about 2 years ago, and follow-up she was told that there appears to be some sclerotic tissue present. She is asymptomatic in the exam room today. No chest pain or SOB. She feels  healthy and looks forward to her procedure.     All other systems reviewed and are negative, other than those listed in the HPI.    Pertinent History  Do you have difficulty breathing or chest pain after walking up a flight of stairs: No   History of obstructive sleep apnea: No  Steroid use in the last 6 months: No  Frequent Aspirin/NSAID use: No  Prior Blood Transfusion: Yes: When she gave birth to her children in 1960s  Prior Blood Transfusion Reaction: No  If for some reason prior to, during or after the procedure, if it is medically indicated, would you be willing to have a blood transfusion?:  There is no transfusion refusal.    Current Outpatient Prescriptions   Medication Sig Dispense Refill     alendronate (FOSAMAX) 70 MG tablet TAKE ONE TABLET BY MOUTH ONCE WEEKLY. TAKE 1ST THING IN THE AM WITH WATER. DO NOT EAT/DRINK/LIE DOWN FOR 30 MINS 12 tablet 3     alendronate (FOSAMAX) 70 MG tablet TAKE ONE TABLET BY MOUTH ONCE WEEKLY TAKE FIRST THING IN THE MORNING WITH WATER DO NOT EAT/ DRINK/LIE DOWN FOR 30 MINUTES  12 tablet 2     amLODIPine (NORVASC) 5 MG tablet TAKE 1 TABLET (5 MG) BY MOUTH DAILY. 90 tablet 2     atenolol (TENORMIN) 25 MG tablet Take 0.5 tablets (12.5 mg total) by mouth daily. 45 tablet 1     atorvastatin (LIPITOR) 10 MG tablet TAKE 1 TABLET BY MOUTH EVERY NIGHT AT BEDTIME 90 tablet 2     calcium carbonate-simethicone (ANTACID ANTI-GAS) 1,000-60 mg Chew 750 mg. TAKE AS DIRECTED.        calcium carbonate-vitamin D2 500 mg(1,250mg) -200 unit tablet Take 1 tablet by mouth 2 (two) times a day.       cholecalciferol, vitamin D3, (VITAMIN D3) 2,000 unit cap Take 1 capsule by mouth.       losartan (COZAAR) 100 MG tablet Take 1 tablet (100 mg total) by mouth daily. 90 tablet 3     triamcinolone (KENALOG) 0.1 % cream Apply to affected areas twice daily. 30 g 0     No current facility-administered medications for this visit.         Allergies   Allergen Reactions     Fosinopril Cough       Patient Active  Problem List   Diagnosis     Hyperlipemia     Anxiety     Essential Hypertension     Insomnia     Osteoporosis       No past medical history on file.    Past Surgical History:   Procedure Laterality Date     HYSTERECTOMY  [id number]     OOPHORECTOMY  [id number]       Social History     Social History     Marital status:      Spouse name: N/A     Number of children: N/A     Years of education: N/A     Occupational History     Not on file.     Social History Main Topics     Smoking status: Former Smoker     Smokeless tobacco: Not on file     Alcohol use Not on file     Drug use: Not on file     Sexual activity: Not on file     Other Topics Concern     Not on file     Social History Narrative       Patient Care Team:  Savana Hagen MD as PCP - General          Objective:     Vitals:    02/23/18 0942   BP: 136/76   Pulse: 70   Temp: 97.7  F (36.5  C)   SpO2: 99%   Weight: 112 lb 8 oz (51 kg)         Physical Exam:    General: No apparent distress. Calm. Alert and Oriented X3. Pt behavior is appropriate.  Head:Atraumatic. Normocephalic, non-tender to palpation  Neck: Supple. No JVD. Full ROM.   Eyes: PERRL, No discharge. No strabismus. No nystagmus.  Ears: TMs pearly gray with landmarks visible.   Nose/Mouth/Throat: Patent nares, no oral lesions, pharynx clear and without exudate. Uvula mid-line. Nasal septum mid-line. Clear turbinates.   Lymph: No axillar or cervical adenopathy.   Chest/Lungs: Normal chest wall, clear to auscultation, normal respiratory effort and rate.   Heart/Pulses: Regular rate and rhythm, strong and equal radial pulses, no murmurs. Capillary refill <2 seconds. No edema.   Abdomen: Soft, no palpable masses. No hepatosplenomegaly, no tenderness with palpation noted. Bowel sounds active in all quadrants. No increased tympany.   Genitalia: Not examined.   Musculoskeletal: No CVA tenderness with palpation. Good ROM with extremities.   Neurologic: Interactive, alert, no focal findings, CNs  intact.   Skin: Warm, dry. Normal hair pattern. Free of lesions. Normal skin turgor.         Patient Instructions     Follow your surgeon's direction on when to stop eating and drinking prior to surgery.      EKG: Not indicated    Labs:  No labs were ordered during this visit    Immunization History   Administered Date(s) Administered     DT (pediatric) 10/08/2002     Influenza high dose, seasonal 09/15/2015, 11/10/2016, 10/10/2017     Influenza, Seasonal, Inj PF IIV3 09/21/2010, 10/17/2012     Influenza, inj, historic,unspecified 11/08/2013, 09/25/2014     Influenza, seasonal,quad inj 6-35 mos 09/23/2009     Pneumo Conj 13-V (2010&after) 06/22/2016     Pneumo Polysac 23-V 10/08/2002     Td,adult,historic,unspecified 10/08/2002     Tdap 06/22/2016           Electronically signed by Pablo Dominguez CNP 02/23/18 9:27 AM

## 2021-06-16 NOTE — PROGRESS NOTES
Ear cerumen removal  Date/Time: 3/14/2018 9:06 AM  Performed by: FARRAH BUNCH  Authorized by: FARRAH BUNCH     Anesthesia:  Local Anesthetic: none  Location details: left ear  Procedure type: curette    Sedation:  Patient sedated: no  Patient tolerance: Patient tolerated the procedure well with no immediate complications

## 2021-06-16 NOTE — PROGRESS NOTES
ASSESSMENT and PLAN:  1. Essential hypertension  I gave her a new prescription to take to her pharmacy that clearly shows she should be taking 25 mg daily.  She will do that and follow-up in 1-2 weeks with uJstin Dominguez.  If her blood pressure is still elevated, then I would either consider increasing her amlodipine from 5-10 mg or starting her on chlorthalidone.  - atenolol (TENORMIN) 25 MG tablet; Take 1 tablet (25 mg total) by mouth daily.  Dispense: 90 tablet; Refill: 3    2. Impacted cerumen of left ear  See procedure note for full details.  I advised her to use Debrox drops on her right ear and have that reevaluated at a follow-up.  - Ear cerumen removal      Medications Discontinued During This Encounter   Medication Reason     atenolol (TENORMIN) 25 MG tablet      atenolol (TENORMIN) 25 MG tablet Reorder       No Follow-up on file.    Patient Instructions   As a first step w/ your blood pressure management, we'll make sure you're taking the full 25 mg of atenolol.    Please set up a follow up in 1-2 weeks w/ Justin to recheck.  If it's still high at that time, I'd either increase your amlodipine to 10mg or add a low dose water pill.    Use the wax drops on the right ear and we can work on getting that side out at a follow up.    Take care!      CHIEF COMPLAINT:  Chief Complaint   Patient presents with     Hypertension       HISTORY OF PRESENT ILLNESS:  Martha Chamorro is a 82 y.o. female  presenting to the clinic today for f/u of HTN.  In the past few months, her blood pressure has been running higher.  Her systolics have been in the 140s, 150s and even 170s.  She is also noticed that her pulse has been higher.  It makes her anxious to have this.  She mentioned to me that back in January she had a brief episode of loss of consciousness while flying.  She was evaluated by an onboard physician who examined her and thought that she was just mildly dehydrated.  She said her blood pressure came right back up.  She was  also checked getting off the plane and everything was fine.  She had no further episodes.  She denies feeling dizzy.  She denies shortness of breath and chest pain.  She denies palpitations.  When she is anxious however, she can feel her heartbeat is stronger.  She had been on half of a 50 mg tablet of atenolol but at some point with refills she ended up being given half of a 25 mg tablet so she is only getting 12.5 instead of 25.  She is also on losartan at 100 mg daily and amlodipine 5 mg daily.    We briefly discussed her anxiety.  She knows that she is a worrier and an anxious person.  At one time, she was tried on Lexapro but she never took it.  She felt that just talking about her issues helped her tremendously.  She was also given an as needed medication at that time.  She does not recall what it was but knows that the pills were very small.  She would be open to trying that again.  She has trouble sleeping before her doctor's appointments as well as before travel due to anxiety.  We had a discussion about the addictive potential of the medication.  I also reviewed with her that if she finds that she could use it daily then we would consider her a candidate for more of a controlling medication.    REVIEW OF SYSTEMS:   Ears: feel plugged   All other systems are negative.    PFSH:  Family History   Problem Relation Age of Onset     Skin cancer Father      Stomach cancer Maternal Grandmother        TOBACCO USE:  History   Smoking Status     Former Smoker   Smokeless Tobacco     Never Used       VITALS:  Vitals:    03/14/18 0815   BP: 158/64   Patient Site: Right Arm   Pulse: 88   Weight: 114 lb 6 oz (51.9 kg)     Wt Readings from Last 3 Encounters:   03/14/18 114 lb 6 oz (51.9 kg)   02/23/18 112 lb 8 oz (51 kg)   08/03/17 114 lb 3 oz (51.8 kg)       PHYSICAL EXAM:  Constitutional:  Reveals an alert, pleasant adult female.   Vitals:  Noted.   Ears: Bilateral cerumen impaction, see procedure note for removal on  the left.  The right was not able to be removed  Neck: No carotid bruits  CV: Regular rate and rhythm, no murmurs, rubs or gallops  Psych: Mildly anxious    MEDICATIONS:  Current Outpatient Prescriptions   Medication Sig Dispense Refill     alendronate (FOSAMAX) 70 MG tablet TAKE ONE TABLET BY MOUTH ONCE WEEKLY TAKE FIRST THING IN THE MORNING WITH WATER DO NOT EAT/ DRINK/LIE DOWN FOR 30 MINUTES  12 tablet 2     amLODIPine (NORVASC) 5 MG tablet TAKE 1 TABLET (5 MG) BY MOUTH DAILY. 90 tablet 2     atenolol (TENORMIN) 25 MG tablet Take 1 tablet (25 mg total) by mouth daily. 90 tablet 3     atorvastatin (LIPITOR) 10 MG tablet TAKE 1 TABLET BY MOUTH EVERY NIGHT AT BEDTIME 90 tablet 2     calcium carbonate-simethicone (ANTACID ANTI-GAS) 1,000-60 mg Chew 750 mg. TAKE AS DIRECTED.        calcium carbonate-vitamin D2 500 mg(1,250mg) -200 unit tablet Take 1 tablet by mouth 2 (two) times a day.       cholecalciferol, vitamin D3, (VITAMIN D3) 2,000 unit cap Take 1 capsule by mouth.       losartan (COZAAR) 100 MG tablet Take 1 tablet (100 mg total) by mouth daily. 90 tablet 3     triamcinolone (KENALOG) 0.1 % cream Apply to affected areas twice daily. 30 g 0     LORazepam (ATIVAN) 1 MG tablet Take 1 tablet (1 mg total) by mouth every 8 (eight) hours as needed for anxiety (or insomnia). 30 tablet 0     No current facility-administered medications for this visit.

## 2021-06-17 NOTE — PROGRESS NOTES
[facility] clinic Follow Up Note    Martha Chamorro   85 y.o. female    Date of Visit: 5/14/2021    Chief Complaint   Patient presents with     Follow-up     6 month follow up- pt is fasting     Cough     Non- productive cough off and on for a couple months     Subjective  Martha is an 85-year-old female living independently with .  Remains quite active with walking on most days.    Hypertension has been controlled range between 124/62 and 143/73.  No orthostasis.  She did increase the amlodipine to 10 mg a day in December.  No ankle edema.    Still on atenolol 25 mg a day.  Her heart rate is generally in the 55-65 range.  No significant increased fatigue or presyncope.    Still on losartan 100 mg a day.  I did review labs from December 2020 with a creatinine of 0.6.    No chest pain or chest pressure.  No palpitations or increasing shortness of breath.    She quit smoking at age 36.  No family history of coronary disease or stroke.  She has not had previous vascular work-up.    Tolerating atorvastatin 10 mg a day.    She has chronic back pain with history of DJD and a disc herniation at L2-3 last fall.  She had an epidural injection that did help quite a bit.  No longer having right L3 nerve root symptoms.  History of mild scoliosis.    She has a daughter who is a physical therapist who helps her with daily exercises.  She walks generally in the home and outside some.  No falls.    Past history of osteopenia, previous 5 years of Fosamax stopped in 2019.  DEXA scan 2018 with a spine score of -1.2.  Femur score -1.4/-1.4 with poor trabecular bone.  No fracture history.  On calcium and vitamin D.    2006 colonoscopy negative and no family history, no further colonoscopies planned.  Normal hemoglobin last December.    No family history of breast cancer, last mammogram 2015.    No headache complaints.    Living with  independently.   has some health issues.    She did see ophthalmology  "last month, no vision issues.    PMHx:  No past medical history on file.  PSHx:    Past Surgical History:   Procedure Laterality Date     HYSTERECTOMY  3831-0905     OOPHORECTOMY  5301-0129     Immunizations:   Immunization History   Administered Date(s) Administered     COVID-19,PF,Pfizer 02/10/2021, 03/03/2021     DT (pediatric) 10/08/2002     Influenza high dose,seasonal,PF, 65+ yrs 09/15/2015, 11/10/2016, 10/10/2017     Influenza, Seasonal, Inj PF IIV3 09/21/2010, 10/17/2012     Influenza, inj, historic,unspecified 11/08/2013, 09/25/2014     Influenza, seasonal,quad inj 6-35 mos 09/23/2009     Influenza,quad,high Dose,PF, 65yr + 12/07/2020     Pneumo Conj 13-V (2010&after) 06/22/2016     Pneumo Polysac 23-V 10/08/2002     Td,adult,historic,unspecified 10/08/2002     Tdap 06/22/2016       ROS A comprehensive review of systems was performed and was otherwise negative    Medications, allergies, and problem list were reviewed and updated    Exam  /70   Pulse 84   Ht 5' 1\" (1.549 m)   Wt 106 lb (48.1 kg)   SpO2 96%   BMI 20.03 kg/m    Appears well.  Alert and oriented x3 with good mood and affect.  No jaundice.  No JVD.  No carotid bruits.  Lungs are clear to auscultation.  Heart is regular without murmur rub or gallop.  No ankle edema.  Abdomen is thin nontender no hepatomegaly or tenderness.  Mobility exam is normal.  No leg weakness.  Gait normal.    Assessment/Plan  1. Essential hypertension  Her blood pressure cuff at home correlates well.  Blood pressures are adequate at home.  Some whitecoat hypertension here.    Continue on current medications including the higher dose of amlodipine that was increased last December.    2. Hypercholesterolemia  I did discuss consideration for further vascular screening such as a carotid ultrasound or cardiac CT, but she did not want to do that at this time.  She prefers to stay on the current atorvastatin.  She has some chronic back pain, but not a new generalized " myalgia condition.  I did add a CK onto her labs today.  - Lipid Cascade    3. Encounter for therapeutic drug monitoring    - Comprehensive Metabolic Panel    History of back herniation, chronic back pain but stable.  Just occasional mild paresthesias of her feet that is positional.  Continue daily walking and exercises given to her by her daughter who is a physical therapist.    History of osteopenia.  Patient did not want to repeat her DEXA scan at this time, consider repeat DEXA scan next year.    Continue yearly eye exam in April    No further colon or breast cancer screening with age.    Return in about 6 months (around 11/14/2021) for Annual physical.   Patient Instructions   No change in treatment plan.    Follow-up in 6 months for adult wellness visit physical exam.    Consider DEXA bone density scan next year.    You could consider carotid ultrasound or screening test for vascular disease, to determine how long you want to stay on your atorvastatin.  No change in treatment plan at this time.    Continue to walk daily and do your back exercises daily.    Raul Lewis MD        Current Outpatient Medications   Medication Sig Dispense Refill     amLODIPine (NORVASC) 10 MG tablet Take 1 tablet (10 mg total) by mouth daily. 90 tablet 3     atenoloL (TENORMIN) 25 MG tablet Take 1 tablet (25 mg total) by mouth daily. 90 tablet 3     atorvastatin (LIPITOR) 10 MG tablet Take 1 tablet (10 mg total) by mouth at bedtime. 90 tablet 3     calcium carbonate-simethicone (ANTACID ANTI-GAS) 1,000-60 mg Chew 750 mg. TAKE AS DIRECTED.        calcium carbonate-vitamin D2 500 mg(1,250mg) -200 unit tablet Take 1 tablet by mouth daily.              cholecalciferol, vitamin D3, (VITAMIN D3) 2,000 unit cap Take 1 capsule by mouth daily.        losartan (COZAAR) 100 MG tablet Take 1 tablet (100 mg total) by mouth daily. 90 tablet 3     No current facility-administered medications for this visit.      Allergies   Allergen Reactions      Fosinopril Cough     Social History     Tobacco Use     Smoking status: Former Smoker     Packs/day: 0.00     Smokeless tobacco: Never Used   Substance Use Topics     Alcohol use: Yes     Comment: occasional     Drug use: Not on file

## 2021-06-17 NOTE — PATIENT INSTRUCTIONS - HE
No change in treatment plan.    Follow-up in 6 months for adult wellness visit physical exam.    Consider DEXA bone density scan next year.    You could consider carotid ultrasound or screening test for vascular disease, to determine how long you want to stay on your atorvastatin.  No change in treatment plan at this time.    Continue to walk daily and do your back exercises daily.

## 2021-06-19 NOTE — LETTER
Letter by Raul Lewis MD at      Author: Raul Lewis MD Service: -- Author Type: --    Filed:  Encounter Date: 9/25/2019 Status: Signed         Martha Chamorro  [address] [facility] [address]             September 25, 2019         Dear Ms. Chamorro,    Below are the results from your recent visit:    Resulted Orders   Comprehensive Metabolic Panel   Result Value Ref Range    Sodium 134 (L) 136 - 145 mmol/L    Potassium 4.2 3.5 - 5.0 mmol/L    Chloride 98 98 - 107 mmol/L    CO2 27 22 - 31 mmol/L    Anion Gap, Calculation 9 5 - 18 mmol/L    Glucose 97 70 - 125 mg/dL    BUN 9 8 - 28 mg/dL    Creatinine 0.72 0.60 - 1.10 mg/dL    GFR MDRD Af Amer >60 >60 mL/min/1.73m2    GFR MDRD Non Af Amer >60 >60 mL/min/1.73m2    Bilirubin, Total 0.9 0.0 - 1.0 mg/dL    Calcium 9.5 8.5 - 10.5 mg/dL    Protein, Total 7.1 6.0 - 8.0 g/dL    Albumin 4.3 3.5 - 5.0 g/dL    Alkaline Phosphatase 70 45 - 120 U/L    AST 23 0 - 40 U/L    ALT 19 0 - 45 U/L    Narrative    Fasting Glucose reference range is 70-99 mg/dL per  American Diabetes Association (ADA) guidelines.   HM2(CBC w/o Differential)   Result Value Ref Range    WBC 6.3 4.0 - 11.0 thou/uL    RBC 3.99 3.80 - 5.40 mill/uL    Hemoglobin 13.4 12.0 - 16.0 g/dL    Hematocrit 39.7 35.0 - 47.0 %     80 - 100 fL    MCH 33.7 27.0 - 34.0 pg    MCHC 33.9 32.0 - 36.0 g/dL    RDW 10.7 (L) 11.0 - 14.5 %    Platelets 221 140 - 440 thou/uL    MPV 7.8 7.0 - 10.0 fL   Lipid Cascade   Result Value Ref Range    Cholesterol 184 <=199 mg/dL    Triglycerides 90 <=149 mg/dL    HDL Cholesterol 60 >=50 mg/dL    LDL Calculated 106 <=129 mg/dL    Patient Fasting > 8hrs? Yes        Kidney and liver tests are normal.  Sodium level is okay.  Blood sugar is normal.    Hemoglobin and blood counts are normal.    Excellent cholesterol levels.    No change in treatment plan.    Please call with questions or contact us using MyChart.    Sincerely,        Electronically signed by Raul Lewis MD

## 2021-06-20 NOTE — PROGRESS NOTES
HPI: This patient presents to clinic today for cerumen removal. Has noticed some fullness of the ears and muffled hearing, but denies otalgia, otorrhea, vertigo, change in true hearing or tinnitus. Denies other symptoms.    Past medical history, surgical history, family history, social history, medications, and allergies have been reviewed and are documented above.     Review of Systems: a 10-system review was performed. Symptoms are noted in the HPI and on a scanned document in the computer chart.    Physical Examination:   GEN: no acute distress, alert and oriented  EYES: extraocular movements intact, pupils equal and round. Sclera clear.   EARS: right cerumen impaction and left excessive cerumen cleared under binocular microscopy using suction/forceps. The tympanic membranes are noted to be intact and clear with no signs of infections, effusions, perforations, or retractions.  PULM: breathing comfortably on room air with no stertor or stridor. Chest expansion symmetric.  CARDS: no cyanosis or clubbing, normal carotid pulses    MEDICAL DECISION-MAKING: cerumen impactions cleared under binocular microscopy without difficulty. Follow-up PRN.

## 2021-06-20 NOTE — LETTER
Letter by Philip Martínez MD at      Author: Philip Martínez MD Service: -- Author Type: --    Filed:  Encounter Date: 10/5/2020 Status: (Other)         Martha Chamorro  [address] [facility] [address]             October 5, 2020         Dear Ms. Chamorro,    Below are the results from your recent visit:    Resulted Orders   HM2(CBC w/o Differential)   Result Value Ref Range    WBC 6.7 4.0 - 11.0 thou/uL    RBC 4.14 3.80 - 5.40 mill/uL    Hemoglobin 13.5 12.0 - 16.0 g/dL    Hematocrit 40.7 35.0 - 47.0 %    MCV 98 80 - 100 fL    MCH 32.6 27.0 - 34.0 pg    MCHC 33.2 32.0 - 36.0 g/dL    RDW 11.2 11.0 - 14.5 %    Platelets 263 140 - 440 thou/uL    MPV 7.3 7.0 - 10.0 fL   Lipid Profile   Result Value Ref Range    Triglycerides 77 <=149 mg/dL    Cholesterol 184 <=199 mg/dL    LDL Calculated 104 <=129 mg/dL    HDL Cholesterol 65 >=50 mg/dL    Patient Fasting > 8hrs? Yes    Comprehensive Metabolic Panel   Result Value Ref Range    Sodium 135 (L) 136 - 145 mmol/L    Potassium 4.3 3.5 - 5.0 mmol/L    Chloride 98 98 - 107 mmol/L    CO2 29 22 - 31 mmol/L    Anion Gap, Calculation 8 5 - 18 mmol/L    Glucose 102 70 - 125 mg/dL    BUN 14 8 - 28 mg/dL    Creatinine 0.71 0.60 - 1.10 mg/dL    GFR MDRD Af Amer >60 >60 mL/min/1.73m2    GFR MDRD Non Af Amer >60 >60 mL/min/1.73m2    Bilirubin, Total 0.7 0.0 - 1.0 mg/dL    Calcium 10.0 8.5 - 10.5 mg/dL    Protein, Total 7.1 6.0 - 8.0 g/dL    Albumin 4.5 3.5 - 5.0 g/dL    Alkaline Phosphatase 68 45 - 120 U/L    AST 19 0 - 40 U/L    ALT 17 0 - 45 U/L    Narrative    Fasting Glucose reference range is 70-99 mg/dL per  American Diabetes Association (ADA) guidelines.        Your results look good. You can continue the same medications. Please set up a follow up visit with Dr. Lewis in 3-6 months.    Please call with questions or contact us using [employer].    Sincerely,        Electronically signed by Philip Martínez MD

## 2021-06-20 NOTE — LETTER
Letter by Philip Martínez MD at      Author: Philip Martínez MD Service: -- Author Type: --    Filed:  Encounter Date: 10/5/2020 Status: (Other)       Parent/guardian of Martha Chamorro  [address] [facility] [address]             October 5, 2020         Dear Martha Chamorro,    Below are the results from Martha's recent visit:    Resulted Orders   HM2(CBC w/o Differential)   Result Value Ref Range    WBC 6.7 4.0 - 11.0 thou/uL    RBC 4.14 3.80 - 5.40 mill/uL    Hemoglobin 13.5 12.0 - 16.0 g/dL    Hematocrit 40.7 35.0 - 47.0 %    MCV 98 80 - 100 fL    MCH 32.6 27.0 - 34.0 pg    MCHC 33.2 32.0 - 36.0 g/dL    RDW 11.2 11.0 - 14.5 %    Platelets 263 140 - 440 thou/uL    MPV 7.3 7.0 - 10.0 fL   Lipid Profile   Result Value Ref Range    Triglycerides 77 <=149 mg/dL    Cholesterol 184 <=199 mg/dL    LDL Calculated 104 <=129 mg/dL    HDL Cholesterol 65 >=50 mg/dL    Patient Fasting > 8hrs? Yes    Comprehensive Metabolic Panel   Result Value Ref Range    Sodium 135 (L) 136 - 145 mmol/L    Potassium 4.3 3.5 - 5.0 mmol/L    Chloride 98 98 - 107 mmol/L    CO2 29 22 - 31 mmol/L    Anion Gap, Calculation 8 5 - 18 mmol/L    Glucose 102 70 - 125 mg/dL    BUN 14 8 - 28 mg/dL    Creatinine 0.71 0.60 - 1.10 mg/dL    GFR MDRD Af Amer >60 >60 mL/min/1.73m2    GFR MDRD Non Af Amer >60 >60 mL/min/1.73m2    Bilirubin, Total 0.7 0.0 - 1.0 mg/dL    Calcium 10.0 8.5 - 10.5 mg/dL    Protein, Total 7.1 6.0 - 8.0 g/dL    Albumin 4.5 3.5 - 5.0 g/dL    Alkaline Phosphatase 68 45 - 120 U/L    AST 19 0 - 40 U/L    ALT 17 0 - 45 U/L    Narrative    Fasting Glucose reference range is 70-99 mg/dL per  American Diabetes Association (ADA) guidelines.       Your results look good. You can continue the same medications. Please set up a follow up visit with Dr. Lewis in 3-6 months.    Please call with questions or contact us using [employer].    Sincerely,        Electronically signed by Philip Martínez MD

## 2021-06-20 NOTE — PROGRESS NOTES
Assessment and Plan:     1. HTN (hypertension)  Her blood pressure is well controlled at home.  She tends to have white coat hypertension.  On recheck today, her blood pressure came down significantly.  - amLODIPine (NORVASC) 5 MG tablet; TAKE 1 TABLET (5 MG) BY MOUTH DAILY.  Dispense: 90 tablet; Refill: 3  - Comprehensive Metabolic Panel    2. Hyperlipemia  She is fasting today for blood work.  We will get her labs drawn.  - atorvastatin (LIPITOR) 10 MG tablet; Take 1 tablet (10 mg total) by mouth at bedtime.  Dispense: 90 tablet; Refill: 3  - Comprehensive Metabolic Panel  - Lipid Cascade    3. Osteoporosis  This is the end of 4 years of Fosamax treatment.  We will update her DEXA scan and determine next steps in follow-up.  I did advise her that we typically do not keep people on Fosamax for more than 5 years in a row at this point.  - alendronate (FOSAMAX) 70 MG tablet; TAKE ONE TABLET BY MOUTH ONCE WEEKLY. TAKE FIRST THING IN THE MORNING WITH WATER. DO NOT EAT, DRINK OR LIE DOWN FOR 30 MINUTES  Dispense: 12 tablet; Refill: 3  - DXA Bone Density Scan; Future  - Vitamin D, Total (25-Hydroxy)    4. Routine general medical examination at a health care facility  We briefly discussed breast cancer screening.  She is on the fence if she will do mammograms every other year or stop doing them altogether.  Reviewed with her that it her age, it really is her choice.  She is otherwise healthy and could have treatment if something was found.  She is up-to-date on all immunizations aside from flu and shingles.  She will do that at a local pharmacy.    5. Impacted cerumen of right ear  She has had difficulty with the right ear for some time.  We will have her see ENT for cerumen evacuation.  - Ambulatory referral to ENT        The patient's current medical problems were reviewed.    I have had an Advance Directives discussion with the patient.  The following health maintenance schedule was reviewed with the patient and  provided in printed form in the after visit summary:   Health Maintenance   Topic Date Due     ZOSTER VACCINE  05/15/1996     DXA SCAN  05/23/2018     INFLUENZA VACCINE RULE BASED (1) 08/01/2018     FALL RISK ASSESSMENT  08/03/2018     ADVANCE DIRECTIVES DISCUSSED WITH PATIENT  03/15/2022     TD 18+ HE  06/22/2026     PNEUMOCOCCAL POLYSACCHARIDE VACCINE AGE 65 AND OVER  Completed     PNEUMOCOCCAL CONJUGATE VACCINE FOR ADULTS (PCV13 OR PREVNAR)  Completed        Subjective:   Chief Complaint: Martha Chamorro is an 83 y.o. female here for an Annual Wellness visit.     HPI: She believes she may have some earwax and would like her ears checked today.    She has a history of hypertension.  She takes atenolol 25 mg once a day, amlodipine 5 mg once daily, and losartan 100 mg daily.  She monitors her blood pressures at home.  She brought a log of those to review.  They are all much better than what we get here today in the office.    She has a history of osteoporosis.  She has now been on Fosamax for 4 years.  She is due for a follow-up bone density test.    Review of Systems:    Please see above.  The rest of the review of systems are negative for all systems.    Patient Care Team:  Savana Hagen MD as PCP - General     Patient Active Problem List   Diagnosis     Hyperlipemia     Anxiety     Essential Hypertension     Insomnia     Osteoporosis     No past medical history on file.   Past Surgical History:   Procedure Laterality Date     HYSTERECTOMY  [id number]     OOPHORECTOMY  [id number]      Family History   Problem Relation Age of Onset     Skin cancer Father      Stomach cancer Maternal Grandmother       Social History     Social History     Marital status:      Spouse name: N/A     Number of children: N/A     Years of education: N/A     Occupational History     Not on file.     Social History Main Topics     Smoking status: Former Smoker     Smokeless tobacco: Never Used     Alcohol use Not on file     Drug  "use: Not on file     Sexual activity: Not on file     Other Topics Concern     Not on file     Social History Narrative      Current Outpatient Prescriptions   Medication Sig Dispense Refill     atenolol (TENORMIN) 25 MG tablet Take 1 tablet (25 mg total) by mouth daily. 90 tablet 3     calcium carbonate-simethicone (ANTACID ANTI-GAS) 1,000-60 mg Chew 750 mg. TAKE AS DIRECTED.        calcium carbonate-vitamin D2 500 mg(1,250mg) -200 unit tablet Take 1 tablet by mouth 2 (two) times a day.       cholecalciferol, vitamin D3, (VITAMIN D3) 2,000 unit cap Take 1 capsule by mouth.       losartan (COZAAR) 100 MG tablet Take 1 tablet (100 mg total) by mouth daily. 90 tablet 3     triamcinolone (KENALOG) 0.1 % cream Apply to affected areas twice daily. 30 g 0     alendronate (FOSAMAX) 70 MG tablet TAKE ONE TABLET BY MOUTH ONCE WEEKLY. TAKE FIRST THING IN THE MORNING WITH WATER. DO NOT EAT, DRINK OR LIE DOWN FOR 30 MINUTES 12 tablet 3     amLODIPine (NORVASC) 5 MG tablet TAKE 1 TABLET (5 MG) BY MOUTH DAILY. 90 tablet 3     atorvastatin (LIPITOR) 10 MG tablet Take 1 tablet (10 mg total) by mouth at bedtime. 90 tablet 3     No current facility-administered medications for this visit.       Objective:   Vital Signs:   Visit Vitals     /60     Pulse 60     Ht 5' 1\" (1.549 m)     Wt 111 lb 3.2 oz (50.4 kg)     BMI 21.01 kg/m2        VisionScreening:  No exam data present     PHYSICAL EXAM:  Constitutional:  Reveals an alert, pleasant adult female.   Vitals:  Noted.   Ears: Right tympanic membrane is obscured by cerumen, left is normal  Eyes: PERRL, conjunctiva/corneas clear, EOM's intact   Throat: Lips, mucosa, and tongue normal; teeth and gums normal   Neck: Normal ROM, no carotid bruits, no thyromegaly   Lungs: Clear to auscultation bilaterally, respirations unlabored.   Breast exam:  Normal skin overlying her breasts bilaterally no discrete nodule is palpable in the axilla bilaterally  Heart: Regular rate and rhythm, S1 " and S2 normal, no murmur, rub, or gallop,   Abdomen: Soft, non-tender, bowel sounds active, no masses, no organomegaly   Extremities: Extremities normal, atraumatic, no cyanosis or edema   Pelvic:Not examined  Skin: Skin color, texture, turgor normal, no rashes or lesions   Neurologic: Normal       Assessment Results 9/6/2018   Activities of Daily Living No help needed   Instrumental Activities of Daily Living No help needed   Mini Cog Total Score 5   Some recent data might be hidden     A Mini-Cog score of 0-2 suggests the possibility of dementia, score of 3-5 suggests no dementia    Identified Health Risks:     Information regarding advance directives (living garcia), including where she can download the appropriate form, was provided to the patient via the AVS.

## 2021-06-21 NOTE — LETTER
Letter by Raul Lewis MD at      Author: Raul Lewis MD Service: -- Author Type: --    Filed:  Encounter Date: 12/8/2020 Status: (Other)         Martha Chamorro  [address] [facility] [address]             December 8, 2020         Dear Ms. Chamorro,    Below are the results from your recent visit:    Resulted Orders   Comprehensive Metabolic Panel   Result Value Ref Range    Sodium 137 136 - 145 mmol/L    Potassium 4.0 3.5 - 5.0 mmol/L    Chloride 99 98 - 107 mmol/L    CO2 27 22 - 31 mmol/L    Anion Gap, Calculation 11 5 - 18 mmol/L    Glucose 99 70 - 125 mg/dL    BUN 8 8 - 28 mg/dL    Creatinine 0.61 0.60 - 1.10 mg/dL    GFR MDRD Af Amer >60 >60 mL/min/1.73m2    GFR MDRD Non Af Amer >60 >60 mL/min/1.73m2    Bilirubin, Total 0.6 0.0 - 1.0 mg/dL    Calcium 9.8 8.5 - 10.5 mg/dL    Protein, Total 6.5 6.0 - 8.0 g/dL    Albumin 4.2 3.5 - 5.0 g/dL    Alkaline Phosphatase 47 45 - 120 U/L    AST 20 0 - 40 U/L    ALT 23 0 - 45 U/L    Narrative    Fasting Glucose reference range is 70-99 mg/dL per  American Diabetes Association (ADA) guidelines.       Kidney and liver tests are normal.  Sodium level is normal.    Blood sugar is normal.    No change in treatment plan.    Please call with questions or contact us using [employer].    Sincerely,        Electronically signed by Raul Lewis MD

## 2021-06-21 NOTE — LETTER
Letter by Raul Lewis MD at      Author: Raul Lewis MD Service: -- Author Type: --    Filed:  Encounter Date: 10/30/2020 Status: (Other)         Martha Chamorro  [address] [facility] [address]             October 30, 2020         Dear Ms. Chamorro,    Below are the results from your recent visit:    Resulted Orders   Basic Metabolic Panel   Result Value Ref Range    Sodium 134 (L) 136 - 145 mmol/L    Potassium 4.5 3.5 - 5.0 mmol/L    Chloride 98 98 - 107 mmol/L    CO2 24 22 - 31 mmol/L    Anion Gap, Calculation 12 5 - 18 mmol/L    Glucose 97 70 - 125 mg/dL    Calcium 9.6 8.5 - 10.5 mg/dL    BUN 6 (L) 8 - 28 mg/dL    Creatinine 0.61 0.60 - 1.10 mg/dL    GFR MDRD Af Amer >60 >60 mL/min/1.73m2    GFR MDRD Non Af Amer >60 >60 mL/min/1.73m2    Narrative    Fasting Glucose reference range is 70-99 mg/dL per  American Diabetes Association (ADA) guidelines.       Just mildly low sodium, okay at current level.  Other kidney labs are normal.  BUN level is okay.  Blood sugar is normal.    Please call with questions or contact us using [employer].    Sincerely,        Electronically signed by Raul Lewis MD

## 2021-06-21 NOTE — LETTER
Letter by Raul eLwis MD at      Author: Raul Lewis MD Service: -- Author Type: --    Filed:  Encounter Date: 5/17/2021 Status: (Other)         Martha Chamorro  [address] [facility] [address]             May 17, 2021         Dear Ms. Chamorro,    Below are the results from your recent visit:    Resulted Orders   Comprehensive Metabolic Panel   Result Value Ref Range    Sodium 142 136 - 145 mmol/L    Potassium 4.2 3.5 - 5.0 mmol/L    Chloride 104 98 - 107 mmol/L    CO2 27 22 - 31 mmol/L    Anion Gap, Calculation 11 5 - 18 mmol/L    Glucose 117 70 - 125 mg/dL    BUN 10 8 - 28 mg/dL    Creatinine 0.71 0.60 - 1.10 mg/dL    GFR MDRD Af Amer >60 >60 mL/min/1.73m2    GFR MDRD Non Af Amer >60 >60 mL/min/1.73m2    Bilirubin, Total 0.5 0.0 - 1.0 mg/dL    Calcium 9.6 8.5 - 10.5 mg/dL    Protein, Total 7.3 6.0 - 8.0 g/dL    Albumin 4.1 3.5 - 5.0 g/dL    Alkaline Phosphatase 68 45 - 120 U/L    AST 22 0 - 40 U/L    ALT 18 0 - 45 U/L    Narrative    Fasting Glucose reference range is 70-99 mg/dL per  American Diabetes Association (ADA) guidelines.   Lipid Cascade   Result Value Ref Range    Cholesterol 179 <=199 mg/dL    Triglycerides 51 <=149 mg/dL    HDL Cholesterol 69 >=50 mg/dL    LDL Calculated 100 <=129 mg/dL    Patient Fasting > 8hrs? Yes    CK Total   Result Value Ref Range    CK, Total 103 30 - 190 U/L       CK, muscle test, is normal.    Kidney and liver tests are normal.  Blood sugar is normal.    Excellent cholesterol levels.    No change in treatment plan.    Please call with questions or contact us using [employer].    Sincerely,        Electronically signed by Raul Lewis MD

## 2021-06-23 VITALS
TEMPERATURE: 97.2 F | WEIGHT: 100 LBS | SYSTOLIC BLOOD PRESSURE: 172 MMHG | BODY MASS INDEX: 18.89 KG/M2 | HEART RATE: 76 BPM | DIASTOLIC BLOOD PRESSURE: 78 MMHG

## 2021-06-24 NOTE — TELEPHONE ENCOUNTER
Refill Approved    Rx renewed per Medication Renewal Policy. Medication was last renewed on 3/14/18.    Patria Berman, [facility] Triage/Med Refill 3/7/2019     Requested Prescriptions   Pending Prescriptions Disp Refills     atenolol (TENORMIN) 25 MG tablet [Pharmacy Med Name: Atenolol Oral Tablet 25 MG] 90 tablet 2     Sig: TAKE ONE TABLET BY MOUTH ONE TIME DAILY    Beta-Blockers Refill Protocol Passed - 3/5/2019 10:51 AM       Passed - PCP or prescribing provider visit in past 12 months or next 3 months    Last office visit with prescriber/PCP: 3/14/2018 Savana Hagen MD OR same dept: 3/14/2018 Savana Hagen MD OR same specialty: 3/14/2018 Savana Hagen MD  Last physical: 9/6/2018 Last MTM visit: Visit date not found   Next visit within 3 mo: Visit date not found  Next physical within 3 mo: Visit date not found  Prescriber OR PCP: Savana Hagen MD  Last diagnosis associated with med order: 1. Essential hypertension  - atenolol (TENORMIN) 25 MG tablet [Pharmacy Med Name: Atenolol Oral Tablet 25 MG]; TAKE ONE TABLET BY MOUTH ONE TIME DAILY   Dispense: 90 tablet; Refill: 2    If protocol passes may refill for 12 months if within 3 months of last provider visit (or a total of 15 months).            Passed - Blood pressure filed in past 12 months    BP Readings from Last 1 Encounters:   09/06/18 102/60

## 2021-06-25 NOTE — TELEPHONE ENCOUNTER
Message left to notify patient  is out of clinic until 4/1/19. Will need to wait for provider to advise.

## 2021-06-25 NOTE — TELEPHONE ENCOUNTER
New Appointment Needed  What is the reason for the visit:    Establish Care  Provider Preference: Dr. Heidy Rivera  How soon do you need to be seen?: as soon as able  Waitlist offered?: N/A  Okay to leave a detailed message:  Yes      Patient states that with her primary, Savana Hagen, leaving, she'd like to establish care with a new PCP.  Patient was hoping to establish with Dr. Rivera, who sees patient's , [employer] ([phone]).  Patient wants to know if  will accept her as a patient.  Will Dr. Rivera make an exception for this patient.  Please callback either way.

## 2021-07-21 ENCOUNTER — RECORDS - HEALTHEAST (OUTPATIENT)
Dept: ADMINISTRATIVE | Facility: CLINIC | Age: 86
End: 2021-07-21

## 2021-08-15 ENCOUNTER — HEALTH MAINTENANCE LETTER (OUTPATIENT)
Age: 86
End: 2021-08-15

## 2021-09-23 DIAGNOSIS — I10 ESSENTIAL HYPERTENSION: Primary | ICD-10-CM

## 2021-09-23 NOTE — TELEPHONE ENCOUNTER
"  Disp Refills Start End ROXANA    losartan (COZAAR) 100 MG tablet 90 tablet 3 9/29/2020  No   Sig - Route: Take 1 tablet (100 mg total) by mouth daily. - Oral   Sent to pharmacy as: losartan 100 mg tablet (COZAAR)   E-Prescribing Status: Receipt confirmed by pharmacy (9/29/2020 10:44 AM CDT)       losartan (COZAAR) 100 MG tablet [[id number]]    Electronically signed by: Philip Martínez MD on 09/29/20 1044 Status: Active   Ordering user: Philip Martínez MD 09/29/20 1044 Authorized by: Philip Martínez MD   Frequency: DAILY 09/29/20 - Until Discontinued Released by: Philip Martínez MD 09/29/20 1044   Diagnoses  Essential hypertension [I10]       Routing refill request to provider for review/approval because:  Labs out of range:  BP    Last Written Prescription Date:  9/29/20  Last Fill Quantity: 90,  # refills: 3   Last office visit provider:  5/14/21     Requested Prescriptions   Pending Prescriptions Disp Refills     losartan (COZAAR) 100 MG tablet [Pharmacy Med Name: Losartan Potassium Oral Tablet 100 MG] 90 tablet 0     Sig: Take 1 tablet (100 mg total) by mouth daily.       Angiotensin-II Receptors Failed - 9/23/2021  2:01 AM        Failed - Last blood pressure under 140/90 in past 12 months     BP Readings from Last 3 Encounters:   05/14/21 (!) 140/70   12/07/20 (!) 172/78   09/24/19 (!) 174/74                 Passed - Recent (12 mo) or future (30 days) visit within the authorizing provider's specialty     Patient has had an office visit with the authorizing provider or a provider within the authorizing providers department within the previous 12 mos or has a future within next 30 days. See \"Patient Info\" tab in inbasket, or \"Choose Columns\" in Meds & Orders section of the refill encounter.              Passed - Medication is active on med list        Passed - Patient is age 18 or older        Passed - No active pregnancy on record        Passed - Normal serum creatinine on file in past 12 months     Recent Labs "   Lab Test 05/14/21 0901   CR 0.71       Ok to refill medication if creatinine is low          Passed - Normal serum potassium on file in past 12 months     Recent Labs   Lab Test 05/14/21 0901   POTASSIUM 4.2                    Passed - No positive pregnancy test in past 12 months             Jose Trevnio RN 09/23/21 3:30 PM

## 2021-09-24 RX ORDER — LOSARTAN POTASSIUM 100 MG/1
TABLET ORAL
Qty: 90 TABLET | Refills: 3 | Status: SHIPPED | OUTPATIENT
Start: 2021-09-24 | End: 2022-07-29

## 2021-10-11 ENCOUNTER — HEALTH MAINTENANCE LETTER (OUTPATIENT)
Age: 86
End: 2021-10-11

## 2021-11-04 ENCOUNTER — OFFICE VISIT (OUTPATIENT)
Dept: INTERNAL MEDICINE | Facility: CLINIC | Age: 86
End: 2021-11-04
Payer: MEDICARE

## 2021-11-04 VITALS
BODY MASS INDEX: 19.84 KG/M2 | OXYGEN SATURATION: 99 % | SYSTOLIC BLOOD PRESSURE: 140 MMHG | WEIGHT: 105 LBS | DIASTOLIC BLOOD PRESSURE: 64 MMHG | HEART RATE: 55 BPM

## 2021-11-04 DIAGNOSIS — I49.49 PREMATURE BEATS: ICD-10-CM

## 2021-11-04 DIAGNOSIS — Z51.81 ENCOUNTER FOR THERAPEUTIC DRUG MONITORING: ICD-10-CM

## 2021-11-04 DIAGNOSIS — E78.00 HYPERCHOLESTEROLEMIA: ICD-10-CM

## 2021-11-04 DIAGNOSIS — H61.23 BILATERAL IMPACTED CERUMEN: ICD-10-CM

## 2021-11-04 DIAGNOSIS — I10 ESSENTIAL HYPERTENSION: Primary | ICD-10-CM

## 2021-11-04 DIAGNOSIS — R00.1 BRADYCARDIA: ICD-10-CM

## 2021-11-04 LAB
ALBUMIN SERPL-MCNC: 4.3 G/DL (ref 3.5–5)
ALP SERPL-CCNC: 63 U/L (ref 45–120)
ALT SERPL W P-5'-P-CCNC: 16 U/L (ref 0–45)
ANION GAP SERPL CALCULATED.3IONS-SCNC: 10 MMOL/L (ref 5–18)
AST SERPL W P-5'-P-CCNC: 21 U/L (ref 0–40)
BILIRUB SERPL-MCNC: 0.7 MG/DL (ref 0–1)
BUN SERPL-MCNC: 11 MG/DL (ref 8–28)
CALCIUM SERPL-MCNC: 10.1 MG/DL (ref 8.5–10.5)
CHLORIDE BLD-SCNC: 102 MMOL/L (ref 98–107)
CHOLEST SERPL-MCNC: 165 MG/DL
CO2 SERPL-SCNC: 26 MMOL/L (ref 22–31)
CREAT SERPL-MCNC: 0.78 MG/DL (ref 0.6–1.1)
ERYTHROCYTE [DISTWIDTH] IN BLOOD BY AUTOMATED COUNT: 13 % (ref 10–15)
FASTING STATUS PATIENT QL REPORTED: NORMAL
GFR SERPL CREATININE-BSD FRML MDRD: 70 ML/MIN/1.73M2
GLUCOSE BLD-MCNC: 110 MG/DL (ref 70–125)
HCT VFR BLD AUTO: 39.2 % (ref 35–47)
HDLC SERPL-MCNC: 65 MG/DL
HGB BLD-MCNC: 12.7 G/DL (ref 11.7–15.7)
LDLC SERPL CALC-MCNC: 84 MG/DL
MCH RBC QN AUTO: 31.9 PG (ref 26.5–33)
MCHC RBC AUTO-ENTMCNC: 32.4 G/DL (ref 31.5–36.5)
MCV RBC AUTO: 99 FL (ref 78–100)
PLATELET # BLD AUTO: 225 10E3/UL (ref 150–450)
POTASSIUM BLD-SCNC: 4.2 MMOL/L (ref 3.5–5)
PROT SERPL-MCNC: 7.1 G/DL (ref 6–8)
RBC # BLD AUTO: 3.98 10E6/UL (ref 3.8–5.2)
SODIUM SERPL-SCNC: 138 MMOL/L (ref 136–145)
TRIGL SERPL-MCNC: 81 MG/DL
TSH SERPL DL<=0.005 MIU/L-ACNC: 2.42 UIU/ML (ref 0.3–5)
WBC # BLD AUTO: 8.4 10E3/UL (ref 4–11)

## 2021-11-04 PROCEDURE — 99214 OFFICE O/P EST MOD 30 MIN: CPT | Performed by: INTERNAL MEDICINE

## 2021-11-04 PROCEDURE — 84443 ASSAY THYROID STIM HORMONE: CPT | Performed by: INTERNAL MEDICINE

## 2021-11-04 PROCEDURE — 80053 COMPREHEN METABOLIC PANEL: CPT | Performed by: INTERNAL MEDICINE

## 2021-11-04 PROCEDURE — 85027 COMPLETE CBC AUTOMATED: CPT | Performed by: INTERNAL MEDICINE

## 2021-11-04 PROCEDURE — 80061 LIPID PANEL: CPT | Performed by: INTERNAL MEDICINE

## 2021-11-04 PROCEDURE — 36415 COLL VENOUS BLD VENIPUNCTURE: CPT | Performed by: INTERNAL MEDICINE

## 2021-11-04 RX ORDER — ACETAMINOPHEN 500 MG
500-1000 TABLET ORAL EVERY 6 HOURS PRN
COMMUNITY

## 2021-11-04 NOTE — PATIENT INSTRUCTIONS
If you do have increasing spells of heart irregularity, or you have lightheaded dizzy spells that are new, or increasing shortness of breath or fatigue when walking, follow-up at that time to get reevaluated.    The continue on your atenolol and current medications at this time.    Follow-up in the spring for adult wellness visit physical exam.    Look on your [employer] computer portal for results of today's lab work.    Get a flu shot this fall.    Goal blood pressure less than 135/85. If you are getting blood pressures less than 115/60, or having more lightheaded-dizzy spells, consider reducing the amlodipine back down to 5 mg a day.    Make an appoint with ENT clinic to get earwax removed.  
Not applicable

## 2021-11-04 NOTE — PROGRESS NOTES
[facility] clinic Follow Up Note    Martha Chamorro   86 year old female    Date of Visit: 11/4/2021    No chief complaint on file.    Subjective  Martha is an 86-year-old female living independently with . Her  has had some medical issues and she is been under somewhat more stress recently.    1 month ago after sitting at a chair, feeling quite relaxed he checked her blood pressure which was normal but her heart rate was 47 and the machine set was irregular. Patient did not have any symptomatic palpitations and no increasing shortness of breath chest pain or diaphoresis. She otherwise felt well at that time.    She has been checking her blood pressure and heart rate regularly. Blood pressure running 121/57-1 41/68. Heart rate running 48-55.    She is on atenolol 25 mg a day and has been on that for many years. Amlodipine 5 mg a day was increased to 10 mg a day December 2020. Still on losartan 100 mg a day.    Normal kidney labs in May.    No worsening fatigue. She is quite active on a regular basis, walks regularly and no increasing dyspnea on exertion.    She is sleeping adequately at night. Some mild daytime sleepiness but sleep apnea not suspected, not obese.    Has not started any new medication or herbal remedies.    She quit smoking at age 36. No family history of coronary disease.    She does still on Lipitor 10 mg a day which he has been on for many years and has tolerated it. Normal CK and liver tests in May. Cholesterol well controlled in May.    Some chronic lower back pain but no flare at this time.    No fever. Mild irritative cough but no shortness of breath or fever. She is already had her Pfizer booster in September. No nasal congestion.    PMHx:  No past medical history on file.  PSHx:    Past Surgical History:   Procedure Laterality Date     HYSTERECTOMY  [id number]     OOPHORECTOMY  [id number]     Immunizations:   Immunization History   Administered Date(s) Administered      COVID-19,PF,Pfizer (12+ Yrs) 02/10/2021, 03/03/2021, 09/29/2021     FLU 6-35 months 10/17/2012     Influenza (High Dose) 3 valent vaccine 09/15/2015, 11/10/2016, 10/10/2017, 10/23/2018     Influenza (IIV3) PF 09/23/2009, 11/08/2013, 09/25/2014     Influenza Quad, Recombinant, pf(RIV4) (Flublok) 10/08/2019     Influenza, Quad, High Dose, Pf, 65yr+ (Fluzone HD) 12/07/2020     Pneumo Conj 13-V (2010&after) 06/22/2016     Pneumococcal 23 valent 10/08/2002     TDAP Vaccine (Adacel) 06/22/2016       ROS A comprehensive review of systems was performed and was otherwise negative    Medications, allergies, and problem list were reviewed and updated    Exam  BP (!) 140/64   Pulse 55   Wt 47.6 kg (105 lb)   SpO2 99%   Breastfeeding No   BMI 19.84 kg/m    Heart is regular, with occasional premature beats about every fourth beat, that did resolve to a regular rhythm with sitting on exam table. No murmur rub or gallop. Lungs are clear. No JVD. She appears mildly anxious, thin female. Abdomen nontender and no edema.    Assessment/Plan  1. Essential hypertension  Blood pressure is well controlled, occasional lower blood pressures. The blood pressure is running on the lower side, she could consider reducing amlodipine back to 5 mg a day. Was continue on current medications at this time and follow-up in the spring for physical exam.    2. Bradycardia  Borderline bradycardia. She is trending more bradycardic but asymptomatic. I suspect she is having some premature beats given her the lower heart rate that she noted.    Her heart exam today was more on the hyperdynamic side with heart rate in the 70s on my auscultation. Likely anxiety associated. 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 She may not be able to tolerate the atenolol in the future. If she has increasing shortness of breath or  lightheaded dizzy spells or increasing fatigue with exertion, would have her do a ultra monitor or patient activated monitor and evaluate for stopping atenolol or changing to metoprolol.      - TSH    3. Premature beats  Occasional premature beats, likely stress-induced. Avoid caffeine. She declined sleep evaluation. Check TSH. Follow-up if worsening symptoms. I did offer her a Holter monitor but she declined at this time.    4. Hypercholesterolemia  Patient is on Lipitor but unclear if she should continue with age. She has not been diagnosed with vascular disease. Has a high HDL.    I added cholesterol lipid profile as well    5. Encounter for therapeutic drug monitoring    - Comprehensive metabolic panel  - CBC with platelets    Bilateral cerumen impaction with some decreased hearing. She is referred to ENT for cerumen removal.    She will get a flu shot later this fall      Return in about 6 months (around 5/4/2022) for Routine preventive.   Patient Instructions   If you do have increasing spells of heart irregularity, or you have lightheaded dizzy spells that are new, or increasing shortness of breath or fatigue when walking, follow-up at that time to get reevaluated.    The continue on your atenolol and current medications at this time.    Follow-up in the spring for adult wellness visit physical exam.    Look on your [employer] computer portal for results of today's lab work.    Get a flu shot this fall.    Goal blood pressure less than 135/85. If you are getting blood pressures less than 115/60, or having more lightheaded-dizzy spells, consider reducing the amlodipine back down to 5 mg a day.    Raul Lewis MD, MD        Current Outpatient Medications   Medication Sig Dispense Refill     acetaminophen (TYLENOL) 500 MG tablet Take 500-1,000 mg by mouth every 6 hours as needed for mild pain       amLODIPine (NORVASC) 5 MG tablet TAKE 1 TABLET (5 MG) BY MOUTH DAILY.       atenolol (TENORMIN) 25 MG tablet Take 25  mg by mouth daily        atorvastatin (LIPITOR) 10 MG tablet Take 10 mg by mouth daily        Calcium Carb-Ergocalciferol 500-200 MG-UNIT TABS Take 1 tablet by mouth daily        cholecalciferol 50 MCG (2000 UT) CAPS Take 1 capsule by mouth daily        losartan (COZAAR) 100 MG tablet Take 1 tablet (100 mg total) by mouth daily. (Patient taking differently: Take 100 mg by mouth daily ) 90 tablet 3     Allergies   Allergen Reactions     Fosinopril Cough     Social History     Tobacco Use     Smoking status: Former Smoker     Packs/day: 0.00     Smokeless tobacco: Never Used   Substance Use Topics     Alcohol use: Yes     Comment: Alcoholic Drinks/day: occasional     Drug use: None

## 2021-11-15 DIAGNOSIS — E78.00 HYPERCHOLESTEROLEMIA: Primary | ICD-10-CM

## 2021-11-16 RX ORDER — ATORVASTATIN CALCIUM 10 MG/1
10 TABLET, FILM COATED ORAL AT BEDTIME
Qty: 90 TABLET | Refills: 3 | Status: SHIPPED | OUTPATIENT
Start: 2021-11-16 | End: 2022-12-29

## 2021-11-16 NOTE — TELEPHONE ENCOUNTER
"  Disp Refills Start End ROXANA    atorvastatin (LIPITOR) 10 MG tablet 90 tablet 3 9/29/2020  No   Sig - Route: Take 1 tablet (10 mg total) by mouth at bedtime. - Oral   Sent to pharmacy as: atorvastatin 10 mg tablet (LIPITOR)   E-Prescribing Status: Receipt confirmed by pharmacy (9/29/2020 10:44 AM CDT)       Last office visit provider:  11/4/21     Requested Prescriptions   Pending Prescriptions Disp Refills     atorvastatin (LIPITOR) 10 MG tablet [Pharmacy Med Name: Atorvastatin Calcium Oral Tablet 10 MG] 90 tablet 0     Sig: Take 1 tablet by mouth at bedtime.       Statins Protocol Passed - 11/15/2021  2:00 AM        Passed - LDL on file in past 12 months     Recent Labs   Lab Test 11/04/21  0956   LDL 84             Passed - No abnormal creatine kinase in past 12 months     Recent Labs   Lab Test 05/14/21  0901                   Passed - Recent (12 mo) or future (30 days) visit within the authorizing provider's specialty     Patient has had an office visit with the authorizing provider or a provider within the authorizing providers department within the previous 12 mos or has a future within next 30 days. See \"Patient Info\" tab in inbasket, or \"Choose Columns\" in Meds & Orders section of the refill encounter.              Passed - Medication is active on med list        Passed - Patient is age 18 or older        Passed - No active pregnancy on record        Passed - No positive pregnancy test in past 12 months             Jose Trevino RN 11/16/21 12:57 PM  "

## 2021-11-18 ENCOUNTER — OFFICE VISIT (OUTPATIENT)
Dept: OTOLARYNGOLOGY | Facility: CLINIC | Age: 86
End: 2021-11-18
Attending: INTERNAL MEDICINE
Payer: MEDICARE

## 2021-11-18 DIAGNOSIS — H61.23 BILATERAL IMPACTED CERUMEN: ICD-10-CM

## 2021-11-18 PROCEDURE — 69210 REMOVE IMPACTED EAR WAX UNI: CPT | Performed by: OTOLARYNGOLOGY

## 2021-11-18 NOTE — PROGRESS NOTES
HPI: This patient presents to clinic today for cerumen removal. Has noticed some fullness of the ears and muffled hearing, but denies otalgia, otorrhea, vertigo, change in true hearing or tinnitus. Denies other symptoms. Last cleaned by me in 9/2018.     Past medical history, surgical history, family history, social history, medications, and allergies have been reviewed and are documented above.      Review of Systems: a 10-system review was performed. Symptoms are noted in the HPI and on a scanned document in the computer chart.     Physical Examination:   GEN: no acute distress, alert and oriented  EYES: extraocular movements intact, pupils equal and round. Sclera clear.   EARS: bilateral cerumen impaction cleared under binocular microscopy using suction/forceps/loop. The tympanic membranes are noted to be intact and clear with no signs of infections, effusions, perforations, or retractions.  PULM: breathing comfortably on room air with no stertor or stridor. Chest expansion symmetric.  CARDS: no cyanosis or clubbing, normal carotid pulses     MEDICAL DECISION-MAKING: cerumen impactions cleared under binocular microscopy without difficulty. Follow-up PRN.

## 2021-11-18 NOTE — LETTER
11/18/2021         RE: Martha Chamorro  [address] [address]        Dear Colleague,    Thank you for referring your patient, Martha Chamorro, to the [facility]. Please see a copy of my visit note below.    HPI: This patient presents to clinic today for cerumen removal. Has noticed some fullness of the ears and muffled hearing, but denies otalgia, otorrhea, vertigo, change in true hearing or tinnitus. Denies other symptoms. Last cleaned by me in 9/2018.     Past medical history, surgical history, family history, social history, medications, and allergies have been reviewed and are documented above.      Review of Systems: a 10-system review was performed. Symptoms are noted in the HPI and on a scanned document in the computer chart.     Physical Examination:   GEN: no acute distress, alert and oriented  EYES: extraocular movements intact, pupils equal and round. Sclera clear.   EARS: bilateral cerumen impaction cleared under binocular microscopy using suction/forceps/loop. The tympanic membranes are noted to be intact and clear with no signs of infections, effusions, perforations, or retractions.  PULM: breathing comfortably on room air with no stertor or stridor. Chest expansion symmetric.  CARDS: no cyanosis or clubbing, normal carotid pulses     MEDICAL DECISION-MAKING: cerumen impactions cleared under binocular microscopy without difficulty. Follow-up PRN.      Again, thank you for allowing me to participate in the care of your patient.        Sincerely,        Heather Green MD

## 2021-12-30 DIAGNOSIS — I10 ESSENTIAL HYPERTENSION: Primary | ICD-10-CM

## 2021-12-30 RX ORDER — AMLODIPINE BESYLATE 5 MG/1
5 TABLET ORAL DAILY
Qty: 90 TABLET | Refills: 3 | Status: SHIPPED | OUTPATIENT
Start: 2021-12-30 | End: 2022-01-05

## 2021-12-30 NOTE — TELEPHONE ENCOUNTER
Refill Request  Medication name: Pending Prescriptions:                       Disp   Refills    amLODIPine (NORVASC) 5 MG tablet                              Sig: Take 1 tablet (5 mg) by mouth daily    Who prescribed the medication: Joshua  Last refill on medication: 10/05/21  [facility]  Last appointment with PCP: 11/04/21  Next appointment: Not due

## 2022-01-05 DIAGNOSIS — I10 ESSENTIAL HYPERTENSION: ICD-10-CM

## 2022-01-05 RX ORDER — AMLODIPINE BESYLATE 5 MG/1
5 TABLET ORAL DAILY
Qty: 90 TABLET | Refills: 3 | Status: SHIPPED | OUTPATIENT
Start: 2022-01-05 | End: 2023-01-24

## 2022-01-05 NOTE — TELEPHONE ENCOUNTER
1-5-22  [facility] called stated they never received the electronic fax for :  amLODIPine (NORVASC) 5 MG tablet  On 12-30-21 , please re-send  [email or web address]

## 2022-01-18 ENCOUNTER — TRANSFERRED RECORDS (OUTPATIENT)
Dept: HEALTH INFORMATION MANAGEMENT | Facility: CLINIC | Age: 87
End: 2022-01-18
Payer: MEDICARE

## 2022-03-01 ENCOUNTER — TRANSFERRED RECORDS (OUTPATIENT)
Dept: HEALTH INFORMATION MANAGEMENT | Facility: CLINIC | Age: 87
End: 2022-03-01
Payer: MEDICARE

## 2022-05-03 ENCOUNTER — TRANSFERRED RECORDS (OUTPATIENT)
Dept: HEALTH INFORMATION MANAGEMENT | Facility: CLINIC | Age: 87
End: 2022-05-03
Payer: MEDICARE

## 2022-07-09 ENCOUNTER — VIRTUAL VISIT (OUTPATIENT)
Dept: URGENT CARE | Facility: CLINIC | Age: 87
End: 2022-07-09
Payer: MEDICARE

## 2022-07-09 DIAGNOSIS — U07.1 CLINICAL DIAGNOSIS OF COVID-19: Primary | ICD-10-CM

## 2022-07-09 PROCEDURE — 99442 PR PHYSICIAN TELEPHONE EVALUATION 11-20 MIN: CPT | Mod: CS | Performed by: FAMILY MEDICINE

## 2022-07-09 NOTE — PROGRESS NOTES
ICD-10-CM    1. Clinical diagnosis of COVID-19  U07.1 nirmatrelvir and ritonavir (PAXLOVID) therapy pack   qualifies for paxlovid. Use of OTC  meds. discussed   hold atorvastatin and 1/2 dose norvasc.    Time of phone visit 12 minutes    -------------------------------  Marthavelia Chamorro with presents with 1 days symptoms including sore throat, congestion. No fever an oxygen and no trouble breathing. Eating and drinking ok.     Treatment measures tried include Tylenol/Ibuprofen.  Exposures--  Tested positive today    Current Outpatient Medications   Medication Sig Dispense Refill     acetaminophen (TYLENOL) 500 MG tablet Take 500-1,000 mg by mouth every 6 hours as needed for mild pain       amLODIPine (NORVASC) 5 MG tablet Take 1 tablet (5 mg) by mouth daily 90 tablet 3     atenolol (TENORMIN) 25 MG tablet Take 1 tablet (25 mg total) by mouth daily. 90 tablet 3     atorvastatin (LIPITOR) 10 MG tablet Take 1 tablet (10 mg) by mouth At Bedtime 90 tablet 3     Calcium Carb-Ergocalciferol 500-200 MG-UNIT TABS Take 1 tablet by mouth daily        cholecalciferol 50 MCG (2000 UT) CAPS Take 1 capsule by mouth daily        losartan (COZAAR) 100 MG tablet Take 1 tablet (100 mg total) by mouth daily. (Patient taking differently: Take 100 mg by mouth daily ) 90 tablet 3       ROS otherwise negative for resp., ID,  HEENT symptoms.    Objective: There were no vitals taken for this visit.

## 2022-07-15 ENCOUNTER — NURSE TRIAGE (OUTPATIENT)
Dept: NURSING | Facility: CLINIC | Age: 87
End: 2022-07-15

## 2022-07-15 NOTE — TELEPHONE ENCOUNTER
Triage Call:     Pt is calling to report elevated blood pressure:  1 hour ago: 172/85, 70  No fever  98% on room air  She denies chest pain, difficulty breathing, unsteady gait, or blurred vision    Pt also reports that she had COVID-19 last Saturday  She completed her course of paxlovid  After taking she feels gassy, mild nausea ,and loose stool in the AM; not a lot, but it has been happening every morning for the past couple of days    Pt is staying hydrated and eating bland foods  The other day patient had salmon, cream peas and it didn't sit well with her stomach the next morning, denies vomiting  Toast, eggs, jello    Pt also reports that her COVID-19 illness and her 's COVID-19 illness has cause her some stress. She takes care of her  due to dementia. She has a history of anxiety and use to take lorazepam. She no longer takes this. She would like to discuss other options for the treatment of anxiety with her provider.     Pt use to go for walks every day, but is now doing more yard work than regular exercise like walking. He has daughters that she gets help from and they check on patient as well.     Disposition: See within 3 days in office. Pt reports that she might go to the nearby [facility] to be seen by an [facility] provider for her blood pressure and will also mention the other sx she is having recently. Pt was given care advice.     Kelli Garcia RN  [facility] Nurse Advisor 3:08 PM 7/15/2022      Reason for Disposition    Systolic BP >= 160 OR Diastolic >= 100    Additional Information    Negative: Sounds like a life-threatening emergency to the triager    Negative: Pregnant > 20 weeks or postpartum (< 6 weeks after delivery) and new hand or face swelling    Negative: Pregnant > 20 weeks and BP > 140/90    Negative: Systolic BP >= 160 OR Diastolic >= 100, and any cardiac or neurologic symptoms (e.g., chest pain, difficulty breathing, unsteady gait, blurred vision)    Negative: Patient sounds very  sick or weak to the triager    Negative: BP Systolic BP >= 140 OR Diastolic >= 90 and postpartum (from 0 to 6 weeks after delivery)    Negative: Systolic BP >= 180 OR Diastolic >= 110, and missed most recent dose of blood pressure medication    Negative: Systolic BP >= 180 OR Diastolic >= 110    Negative: Patient wants to be seen    Negative: Ran out of BP medications    Negative: Taking BP medications and feels is having side effects (e.g., impotence, cough, dizziness)    Protocols used: HIGH BLOOD PRESSURE-A-OH

## 2022-07-23 ENCOUNTER — HOSPITAL ENCOUNTER (INPATIENT)
Facility: CLINIC | Age: 87
LOS: 2 days | Discharge: HOME | DRG: 641 | End: 2022-07-25
Attending: EMERGENCY MEDICINE | Admitting: HOSPITALIST
Payer: MEDICARE

## 2022-07-23 DIAGNOSIS — Z86.16 PERSONAL HISTORY OF COVID-19: Primary | ICD-10-CM

## 2022-07-23 DIAGNOSIS — F43.22 ADJUSTMENT DISORDER WITH ANXIOUS MOOD: ICD-10-CM

## 2022-07-23 DIAGNOSIS — E87.1 HYPONATREMIA: ICD-10-CM

## 2022-07-23 PROBLEM — E78.2 MIXED HYPERLIPIDEMIA: Status: ACTIVE | Noted: 2022-07-23

## 2022-07-23 PROBLEM — I10 ESSENTIAL HYPERTENSION: Status: ACTIVE | Noted: 2020-10-04

## 2022-07-23 LAB
ALBUMIN SERPL-MCNC: 4.1 G/DL (ref 3.5–5)
ALBUMIN UR-MCNC: NEGATIVE MG/DL
ALP SERPL-CCNC: 70 U/L (ref 45–120)
ALT SERPL W P-5'-P-CCNC: 23 U/L (ref 0–45)
ANION GAP SERPL CALCULATED.3IONS-SCNC: 13 MMOL/L (ref 5–18)
APPEARANCE UR: CLEAR
AST SERPL W P-5'-P-CCNC: 20 U/L (ref 0–40)
BASOPHILS # BLD MANUAL: 0 10E3/UL (ref 0–0.2)
BASOPHILS NFR BLD MANUAL: 0 %
BILIRUB SERPL-MCNC: 0.5 MG/DL (ref 0–1)
BILIRUB UR QL STRIP: NEGATIVE
BUN SERPL-MCNC: 5 MG/DL (ref 8–28)
CALCIUM SERPL-MCNC: 9.4 MG/DL (ref 8.5–10.5)
CHLORIDE BLD-SCNC: 86 MMOL/L (ref 98–107)
CO2 SERPL-SCNC: 21 MMOL/L (ref 22–31)
COLOR UR AUTO: COLORLESS
CREAT SERPL-MCNC: 0.58 MG/DL (ref 0.6–1.1)
EOSINOPHIL # BLD MANUAL: 0 10E3/UL (ref 0–0.7)
EOSINOPHIL NFR BLD MANUAL: 0 %
ERYTHROCYTE [DISTWIDTH] IN BLOOD BY AUTOMATED COUNT: 11.9 % (ref 10–15)
GFR SERPL CREATININE-BSD FRML MDRD: 88 ML/MIN/1.73M2
GLUCOSE BLD-MCNC: 142 MG/DL (ref 70–125)
GLUCOSE UR STRIP-MCNC: NEGATIVE MG/DL
HCT VFR BLD AUTO: 38.5 % (ref 35–47)
HGB BLD-MCNC: 13.5 G/DL (ref 11.7–15.7)
HGB UR QL STRIP: NEGATIVE
HOLD SPECIMEN: NORMAL
KETONES UR STRIP-MCNC: NEGATIVE MG/DL
LEUKOCYTE ESTERASE UR QL STRIP: NEGATIVE
LYMPHOCYTES # BLD MANUAL: 1.3 10E3/UL (ref 0.8–5.3)
LYMPHOCYTES NFR BLD MANUAL: 19 %
MAGNESIUM SERPL-MCNC: 1.7 MG/DL (ref 1.8–2.6)
MCH RBC QN AUTO: 31.8 PG (ref 26.5–33)
MCHC RBC AUTO-ENTMCNC: 35.1 G/DL (ref 31.5–36.5)
MCV RBC AUTO: 91 FL (ref 78–100)
MONOCYTES # BLD MANUAL: 0.7 10E3/UL (ref 0–1.3)
MONOCYTES NFR BLD MANUAL: 10 %
NEUTROPHILS # BLD MANUAL: 4.9 10E3/UL (ref 1.6–8.3)
NEUTROPHILS NFR BLD MANUAL: 71 %
NITRATE UR QL: NEGATIVE
OSMOLALITY SERPL: 253 MMOL/KG (ref 280–301)
PH UR STRIP: 7.5 [PH] (ref 5–7)
PLAT MORPH BLD: NORMAL
PLATELET # BLD AUTO: 262 10E3/UL (ref 150–450)
POTASSIUM BLD-SCNC: 3.9 MMOL/L (ref 3.5–5)
PROT SERPL-MCNC: 7.9 G/DL (ref 6–8)
RBC # BLD AUTO: 4.25 10E6/UL (ref 3.8–5.2)
RBC MORPH BLD: NORMAL
RBC URINE: <1 /HPF
SODIUM SERPL-SCNC: 120 MMOL/L (ref 136–145)
SODIUM SERPL-SCNC: 123 MMOL/L (ref 136–145)
SODIUM SERPL-SCNC: 123 MMOL/L (ref 136–145)
SODIUM SERPL-SCNC: 126 MMOL/L (ref 136–145)
SODIUM SERPL-SCNC: 127 MMOL/L (ref 136–145)
SODIUM UR-SCNC: 46 MMOL/L
SP GR UR STRIP: 1.01 (ref 1–1.03)
UROBILINOGEN UR STRIP-MCNC: <2 MG/DL
WBC # BLD AUTO: 6.9 10E3/UL (ref 4–11)
WBC URINE: 0 /HPF

## 2022-07-23 PROCEDURE — 81001 URINALYSIS AUTO W/SCOPE: CPT | Performed by: INTERNAL MEDICINE

## 2022-07-23 PROCEDURE — 83935 ASSAY OF URINE OSMOLALITY: CPT | Performed by: HOSPITALIST

## 2022-07-23 PROCEDURE — 120N000001 HC R&B MED SURG/OB

## 2022-07-23 PROCEDURE — 99285 EMERGENCY DEPT VISIT HI MDM: CPT | Mod: 25

## 2022-07-23 PROCEDURE — 258N000003 HC RX IP 258 OP 636: Performed by: INTERNAL MEDICINE

## 2022-07-23 PROCEDURE — 258N000003 HC RX IP 258 OP 636: Performed by: EMERGENCY MEDICINE

## 2022-07-23 PROCEDURE — 36415 COLL VENOUS BLD VENIPUNCTURE: CPT | Performed by: EMERGENCY MEDICINE

## 2022-07-23 PROCEDURE — 96374 THER/PROPH/DIAG INJ IV PUSH: CPT

## 2022-07-23 PROCEDURE — 99222 1ST HOSP IP/OBS MODERATE 55: CPT | Mod: AI | Performed by: HOSPITALIST

## 2022-07-23 PROCEDURE — 96361 HYDRATE IV INFUSION ADD-ON: CPT

## 2022-07-23 PROCEDURE — 36415 COLL VENOUS BLD VENIPUNCTURE: CPT | Performed by: HOSPITALIST

## 2022-07-23 PROCEDURE — 36415 COLL VENOUS BLD VENIPUNCTURE: CPT | Performed by: INTERNAL MEDICINE

## 2022-07-23 PROCEDURE — 99222 1ST HOSP IP/OBS MODERATE 55: CPT | Performed by: INTERNAL MEDICINE

## 2022-07-23 PROCEDURE — 83930 ASSAY OF BLOOD OSMOLALITY: CPT | Performed by: INTERNAL MEDICINE

## 2022-07-23 PROCEDURE — 85027 COMPLETE CBC AUTOMATED: CPT | Performed by: EMERGENCY MEDICINE

## 2022-07-23 PROCEDURE — 84300 ASSAY OF URINE SODIUM: CPT | Performed by: HOSPITALIST

## 2022-07-23 PROCEDURE — 84295 ASSAY OF SERUM SODIUM: CPT | Performed by: HOSPITALIST

## 2022-07-23 PROCEDURE — 250N000011 HC RX IP 250 OP 636: Performed by: INTERNAL MEDICINE

## 2022-07-23 PROCEDURE — 83735 ASSAY OF MAGNESIUM: CPT | Performed by: EMERGENCY MEDICINE

## 2022-07-23 PROCEDURE — 250N000013 HC RX MED GY IP 250 OP 250 PS 637: Performed by: HOSPITALIST

## 2022-07-23 PROCEDURE — 85007 BL SMEAR W/DIFF WBC COUNT: CPT | Performed by: EMERGENCY MEDICINE

## 2022-07-23 PROCEDURE — 80053 COMPREHEN METABOLIC PANEL: CPT | Performed by: EMERGENCY MEDICINE

## 2022-07-23 RX ORDER — LOSARTAN POTASSIUM 50 MG/1
100 TABLET ORAL DAILY
Status: DISCONTINUED | OUTPATIENT
Start: 2022-07-23 | End: 2022-07-25 | Stop reason: HOSPADM

## 2022-07-23 RX ORDER — LIDOCAINE 40 MG/G
CREAM TOPICAL
Status: DISCONTINUED | OUTPATIENT
Start: 2022-07-23 | End: 2022-07-25 | Stop reason: HOSPADM

## 2022-07-23 RX ORDER — CALCIUM CARBONATE 500 MG/1
500 TABLET, CHEWABLE ORAL DAILY
Status: DISCONTINUED | OUTPATIENT
Start: 2022-07-23 | End: 2022-07-25 | Stop reason: HOSPADM

## 2022-07-23 RX ORDER — ATENOLOL 25 MG/1
25 TABLET ORAL DAILY
Status: DISCONTINUED | OUTPATIENT
Start: 2022-07-23 | End: 2022-07-25 | Stop reason: HOSPADM

## 2022-07-23 RX ORDER — ACETAMINOPHEN 325 MG/1
650 TABLET ORAL EVERY 6 HOURS PRN
Status: DISCONTINUED | OUTPATIENT
Start: 2022-07-23 | End: 2022-07-25 | Stop reason: HOSPADM

## 2022-07-23 RX ORDER — ONDANSETRON 2 MG/ML
4 INJECTION INTRAMUSCULAR; INTRAVENOUS EVERY 6 HOURS PRN
Status: DISCONTINUED | OUTPATIENT
Start: 2022-07-23 | End: 2022-07-25 | Stop reason: HOSPADM

## 2022-07-23 RX ORDER — ONDANSETRON 4 MG/1
4 TABLET, ORALLY DISINTEGRATING ORAL EVERY 6 HOURS PRN
Status: DISCONTINUED | OUTPATIENT
Start: 2022-07-23 | End: 2022-07-25 | Stop reason: HOSPADM

## 2022-07-23 RX ORDER — VITAMIN B COMPLEX
50 TABLET ORAL DAILY
Status: DISCONTINUED | OUTPATIENT
Start: 2022-07-23 | End: 2022-07-25 | Stop reason: HOSPADM

## 2022-07-23 RX ORDER — CALCIUM CARBONATE 500(1250)
500 TABLET ORAL DAILY
Status: DISCONTINUED | OUTPATIENT
Start: 2022-07-23 | End: 2022-07-25 | Stop reason: HOSPADM

## 2022-07-23 RX ORDER — ONDANSETRON 2 MG/ML
4 INJECTION INTRAMUSCULAR; INTRAVENOUS ONCE
Status: DISCONTINUED | OUTPATIENT
Start: 2022-07-23 | End: 2022-07-25 | Stop reason: HOSPADM

## 2022-07-23 RX ORDER — ACETAMINOPHEN 650 MG/1
650 SUPPOSITORY RECTAL EVERY 6 HOURS PRN
Status: DISCONTINUED | OUTPATIENT
Start: 2022-07-23 | End: 2022-07-25 | Stop reason: HOSPADM

## 2022-07-23 RX ORDER — CALCIUM CARBONATE 500 MG/1
1 TABLET, CHEWABLE ORAL DAILY
COMMUNITY

## 2022-07-23 RX ORDER — HYDROXYZINE HYDROCHLORIDE 10 MG/1
10 TABLET, FILM COATED ORAL EVERY 4 HOURS PRN
Status: DISCONTINUED | OUTPATIENT
Start: 2022-07-23 | End: 2022-07-25 | Stop reason: HOSPADM

## 2022-07-23 RX ORDER — AMLODIPINE BESYLATE 5 MG/1
5 TABLET ORAL DAILY
Status: DISCONTINUED | OUTPATIENT
Start: 2022-07-23 | End: 2022-07-25 | Stop reason: HOSPADM

## 2022-07-23 RX ORDER — DIPHENHYDRAMINE HCL 25 MG
50 TABLET ORAL
COMMUNITY
End: 2022-07-29

## 2022-07-23 RX ORDER — DIPHENHYDRAMINE HCL 50 MG
50 CAPSULE ORAL
Status: DISCONTINUED | OUTPATIENT
Start: 2022-07-23 | End: 2022-07-25 | Stop reason: HOSPADM

## 2022-07-23 RX ORDER — ATORVASTATIN CALCIUM 10 MG/1
10 TABLET, FILM COATED ORAL AT BEDTIME
Status: DISCONTINUED | OUTPATIENT
Start: 2022-07-23 | End: 2022-07-25 | Stop reason: HOSPADM

## 2022-07-23 RX ADMIN — Medication 50 MCG: at 15:12

## 2022-07-23 RX ADMIN — ATENOLOL 25 MG: 25 TABLET ORAL at 15:12

## 2022-07-23 RX ADMIN — CALCIUM CARBONATE (ANTACID) CHEW TAB 500 MG 500 MG: 500 CHEW TAB at 15:12

## 2022-07-23 RX ADMIN — MAGNESIUM SULFATE HEPTAHYDRATE 1 G: 500 INJECTION, SOLUTION INTRAMUSCULAR; INTRAVENOUS at 17:29

## 2022-07-23 RX ADMIN — HYDROXYZINE HYDROCHLORIDE 10 MG: 10 TABLET ORAL at 21:56

## 2022-07-23 RX ADMIN — HYDROXYZINE HYDROCHLORIDE 10 MG: 10 TABLET ORAL at 15:37

## 2022-07-23 RX ADMIN — AMLODIPINE BESYLATE 5 MG: 5 TABLET ORAL at 15:12

## 2022-07-23 RX ADMIN — ATORVASTATIN CALCIUM 10 MG: 10 TABLET, FILM COATED ORAL at 21:56

## 2022-07-23 RX ADMIN — CALCIUM 500 MG: 500 TABLET ORAL at 15:12

## 2022-07-23 RX ADMIN — SODIUM CHLORIDE 1000 ML: 9 INJECTION, SOLUTION INTRAVENOUS at 05:13

## 2022-07-23 RX ADMIN — LOSARTAN POTASSIUM 100 MG: 50 TABLET, FILM COATED ORAL at 15:12

## 2022-07-23 ASSESSMENT — ACTIVITIES OF DAILY LIVING (ADL)
ADLS_ACUITY_SCORE: 37
ADLS_ACUITY_SCORE: 23
ADLS_ACUITY_SCORE: 22
ADLS_ACUITY_SCORE: 22
ADLS_ACUITY_SCORE: 35
DEPENDENT_IADLS:: INDEPENDENT
ADLS_ACUITY_SCORE: 35
ADLS_ACUITY_SCORE: 22

## 2022-07-23 NOTE — PHARMACY-ADMISSION MEDICATION HISTORY
Pharmacy Note - Admission Medication History    Pertinent Provider Information:      ______________________________________________________________________    Prior To Admission (PTA) med list completed and updated in EMR.       PTA Med List   Medication Sig Last Dose     acetaminophen (TYLENOL) 500 MG tablet Take 500-1,000 mg by mouth every 6 hours as needed for mild pain 7/22/2022 at Unknown time     amLODIPine (NORVASC) 5 MG tablet Take 1 tablet (5 mg) by mouth daily 7/22/2022 at AM     atenolol (TENORMIN) 25 MG tablet Take 1 tablet (25 mg total) by mouth daily. 7/22/2022 at AM     atorvastatin (LIPITOR) 10 MG tablet Take 1 tablet (10 mg) by mouth At Bedtime 7/21/2022 at HS     calcium carbonate (OS-FANNIE) 1500 (600 Ca) MG tablet Take 600 mg by mouth daily 7/22/2022 at Unknown time     calcium carbonate (TUMS) 500 MG chewable tablet Take 1 chew tab by mouth daily 7/21/2022 at AM     cholecalciferol 50 MCG (2000 UT) CAPS Take 1 capsule by mouth daily  7/22/2022 at Unknown time     diphenhydrAMINE (BENADRYL) 25 MG tablet Take 50 mg by mouth nightly as needed for itching or allergies 7/21/2022 at HS     losartan (COZAAR) 100 MG tablet Take 1 tablet (100 mg total) by mouth daily. 7/22/2022 at AM       [facility]  Method of interview communication: in-person    Summary of Changes to PTA Med List  New: TUMS, diphenhydramine,   Discontinued: n/a  Changed: calcium 600 mg QD    Patient was asked about OTC/herbal products specifically.  PTA med list reflects this.    In the past week, patient estimated taking medication this percent of the time:  greater than 90%.    Allergies were reviewed, assessed, and updated with the patient.      Patient does not use any multi-dose medications prior to admission.    The information provided in this note is only as accurate as the sources available at the time of the update(s).    Thank you for the opportunity to participate in the care  of this patient.    Latha Sheridan  7/23/2022 10:05 AM

## 2022-07-23 NOTE — PLAN OF CARE
Problem: Electrolyte Imbalance  Goal: Electrolyte Balance  Outcome: Ongoing, Progressing       Patient admitted to 2104 from ED; report received from YEFRI Frost. Vitally stable after blood pressure meds in ED. Feels better overall. Per patient, steadier on her feet with ambulation, less fatigued. Na 126 at 1800. Hopeful to sleep tonight.

## 2022-07-23 NOTE — CONSULTS
Care Management Initial Consult    General Information  Assessment completed with: Patient, Children, Patient and daughter Alfredo  Type of CM/SW Visit: Initial Assessment    Primary Care Provider verified and updated as needed: Yes   Readmission within the last 30 days: no previous admission in last 30 days      Reason for Consult: discharge planning  Advance Care Planning: Advance Care Planning Reviewed: other (see comments) ([employer]; has information at home)          Communication Assessment  Patient's communication style: spoken language (English or Bilingual)    Hearing Difficulty or Deaf: no   Wear Glasses or Blind: yes    Cognitive  Cognitive/Neuro/Behavioral: WDL                      Living Environment:   People in home: spouse     Current living Arrangements: house      Able to return to prior arrangements: yes       Family/Social Support:  Care provided by: self  Provides care for: spouse  Marital Status:   , Children          Description of Support System: Supportive, Involved    Support Assessment: Adequate family and caregiver support    Current Resources:   Patient receiving home care services: No     Community Resources: None  Equipment currently used at home: none  Supplies currently used at home: None    Employment/Financial:  Employment Status: retired        Financial Concerns: No concerns identified           Lifestyle & Psychosocial Needs:  Social Determinants of Health     Tobacco Use: Medium Risk     Smoking Tobacco Use: Former Smoker     Smokeless Tobacco Use: Never Used   Alcohol Use: Not on file   Financial Resource Strain: Not on file   Food Insecurity: Not on file   Transportation Needs: Not on file   Physical Activity: Not on file   Stress: Not on file   Social Connections: Not on file   Intimate Partner Violence: Not on file   Depression: Not at risk     PHQ-2 Score: 1   Housing Stability: Not on file       Functional Status:  Prior to admission patient  needed assistance:   Dependent ADLs:: Independent  Dependent IADLs:: Independent  Assesssment of Functional Status: Not at  functional baseline    Mental Health Status:          Chemical Dependency Status:                Values/Beliefs:  Spiritual, Cultural Beliefs, [employer] Practices, Values that affect care:                 Additional Information:  Alert oriented patient lives with  in a private residence. Presents with daughter Alfredo, to  ED for evaluation of insomnia and decreased oral intake. States she is independent with all I/ADLs; no home care services; no assistive device; does drive. Patient is primary caregiver to her . Patient doesn t feel she needs any home care services. Declined [employer] information. Plans to return home with daughter Alfredo transporting patient on discharge. Other needs pending clinical progress and PT evaluation.      CHANTEL MCGINNIS RN/CM

## 2022-07-23 NOTE — H&P
"[facility] MEDICINE ADMISSION HISTORY AND PHYSICAL     Assessment & Plan       Martha Chamorro is a 87 year old old female with history of HTN, HLD, presented with acute hyponatremia with Na at 120. Nephrology consultation much appreciated. ED already ordered/administered a full 1000 mL bolus. Recheck Na now at admission before any further fluids, and then check Na q4H; urine osmolality and urine Na ordered. Aim for 6-8 mEq within next 24-hr to avoid overly-rapid correction. Discussed this with Martha and her daughter at-length.     Hyponatremia, moderate-severe  Initial Na 120  -By chart review, history, physical exam, and labs, hyponatremia is likely hypotonic and hypovolemic.   -Most likely etiology is hypovolemic, although may be multifactorial as well, pending initial labs.  -Initial Na noted at 120.   -Plan for Na checks: q4H.  -Order urine Na, urine osmolality to further evaluate etiology.   -Nephrology consultation appreciated.   -Aim for 6-8 mEq within next 24-hr to avoid overly-rapid correction    HLD  -Order home statin.    HTN  -Order home medications and as needed apply specified hold parameters.     Recovered COVID19 Infection  -14-days ago prior to admission; she completed Paxlovid course.   -No immunocompromised risk/history, so 10-days post is considered recovered.     Clinically Significant Risk Factors Present on Admission         # Hyponatremia: Na = 120 mmol/L (Ref range: 136 - 145 mmol/L) on admission, will monitor as appropriate         # Hypertension: home medication list includes antihypertensive(s)            DVTP: Mechanical Prophylaxis/ Sequential Compression Devices  Code Status: No Order  Disposition: Inpatient   Expected LOS: 2 days   Goals for the hospitalization: IVF, urine studies, q4H labs, nephrology consultation  Disposition Plan         The patient's care was discussed with the Patient and Patient's Family.  Chief Complaint  feeling \"out of it\" and " "nauseous and fatigued     HISTORY     Martha Chamorro is a 87 year old old female with history of HTN, HLD, presented with acute hyponatremia with Na at 120. Nephrology consultation much appreciated. ED already ordered/administered a full 1000 mL bolus.     This is a pleasant woman who had a recent COVID-19 viral infection 2 weeks ago and completed a full course of oral Paxlovid.  In this recent acute illness setting, she has had decreased oral intake and progressive lethargy and fatigue and generalized weakness.  She also endorses brain fog described as \"out of it\" feeling and presents today to the emergency room and found to have hyponatremia with initial sodium of 120.  She denies any fever or chills or shortness of breath.  She denies any COVID symptoms at this time.  No other associated symptoms.  Her  also had COVID and also underwent the same outpatient antiviral treatment.  Otherwise, there is no endorsement of any fevers, rigors, chest pain or shortness of breath, changes in bowel movements/pattern, urinary symptoms, focal weakness, or any other new and associated symptoms at this time.  The patient has been compliant with home medications as prescribed. 14 point review of systems performed with the patient without any other pertinent positives at this time. Her daughter is at bedside. Discussed with Martha and her daughter at-length regarding hyponatremia pathophysiology, plan of care and treatment plan, and also plan to recheck Na now at admission (after 1 L bolus from ED and hours from last), and then q4H, checking urine osmolality and urine Na, and plan to aim for 6-8 mEq within next 24-hr to avoid overly-rapid correction.     The social history, family history, and past medical history were directly reviewed with the patient and corroborated to be accurate as documented below. All questions the patient had at this time were answered to verbalized and stated satisfaction and understanding. Code " status was discussed and the patient confirmed wishes for full code for this hospitalization.    Past Medical History     No past medical history on file.     Surgical History     Past Surgical History:   Procedure Laterality Date     HYSTERECTOMY  [id number]     OOPHORECTOMY  [id number]     Family History    Reviewed, and   Family History   Problem Relation Age of Onset     Skin Cancer Father      Stomach Cancer Maternal Grandmother       Social History      Social History     Tobacco Use     Smoking status: Former Smoker     Packs/day: 0.00     Smokeless tobacco: Never Used   Substance Use Topics     Alcohol use: Yes     Comment: Alcoholic Drinks/day: occasional      Allergies     Allergies   Allergen Reactions     Fosinopril Cough     Prior to Admission Medications      Prior to Admission Medications   Prescriptions Last Dose Informant Patient Reported? Taking?   Calcium Carb-Ergocalciferol 500-200 MG-UNIT TABS   Yes No   Sig: Take 1 tablet by mouth daily    acetaminophen (TYLENOL) 500 MG tablet   Yes No   Sig: Take 500-1,000 mg by mouth every 6 hours as needed for mild pain   amLODIPine (NORVASC) 5 MG tablet   No No   Sig: Take 1 tablet (5 mg) by mouth daily   atenolol (TENORMIN) 25 MG tablet   No No   Sig: Take 1 tablet (25 mg total) by mouth daily.   atorvastatin (LIPITOR) 10 MG tablet   No No   Sig: Take 1 tablet (10 mg) by mouth At Bedtime   cholecalciferol 50 MCG (2000 UT) CAPS   Yes No   Sig: Take 1 capsule by mouth daily    losartan (COZAAR) 100 MG tablet   No No   Sig: Take 1 tablet (100 mg total) by mouth daily.   Patient taking differently: Take 100 mg by mouth daily       Facility-Administered Medications: None      Review of Systems     A 12 point comprehensive review of systems was negative except as noted above in HPI.    PHYSICAL EXAMINATION       Vitals      Temp:  [97.2  F (36.2  C)] 97.2  F (36.2  C)  Pulse:  [72-94] 81  Resp:  [16-18] 18  BP: (150-187)/(70-93) 168/74  SpO2:  [98 %] 98  %    Examination     GENERAL:  Alert, appears comfortable, in no acute distress, appears stated age   HEAD:  Normocephalic, without obvious abnormality, atraumatic   EYES:  PERRL, conjunctiva/corneas clear, no scleral icterus, EOM's intact   NOSE: Nares normal, septum midline, mucosa normal, no drainage   THROAT: Lips, mucosa, and tongue normal; teeth and gums normal, mouth moist   NECK: Supple, symmetrical, trachea midline   BACK:   Symmetric, no curvature, ROM normal   LUNGS:   Clear to auscultation bilaterally, no rales, rhonchi, or wheezing, symmetric chest rise on inhalation, respirations unlabored   CHEST WALL:  No tenderness or deformity   HEART:  Regular rate and rhythm, S1 and S2 normal, no murmur, rub, or gallop    ABDOMEN:   Soft, non-tender, bowel sounds active all four quadrants, no masses, no organomegaly, no rebound or guarding   EXTREMITIES: Extremities normal, atraumatic, no cyanosis or edema    SKIN: Dry to touch, no exanthems in the visualized areas   NEURO: Alert, oriented x 4, moves all four extremities freely, non-focal   PSYCH: Cooperative, behavior is appropriate      Pertinent Lab     Most Recent 3 CBC's:Recent Labs   Lab Test 07/23/22  0506 11/04/21  0956 09/30/20  1050   WBC 6.9 8.4 6.7   HGB 13.5 12.7 13.5   MCV 91 99 98    225 263     Most Recent 3 BMP's:Recent Labs   Lab Test 07/23/22  0506 11/04/21  0956 05/14/21  0901   * 138 142   POTASSIUM 3.9 4.2 4.2   CHLORIDE 86* 102 104   CO2 21* 26 27   BUN 5* 11 10   CR 0.58* 0.78 0.71   ANIONGAP 13 10 11   FANNIE 9.4 10.1 9.6   * 110 117     Most Recent 2 LFT's:Recent Labs   Lab Test 07/23/22  0506 11/04/21  0956   AST 20 21   ALT 23 16   ALKPHOS 70 63   BILITOTAL 0.5 0.7         Pertinent Radiology     Radiology Results: No results found for this or any previous visit (from the past 24 hour(s)).  EKG Results: personally reviewed.     Advance Care Planning        Brady Escamilla MD  Internal Medicine  [place] [place] [facility]  [facility]  [facility]  Phone: #[phone]

## 2022-07-23 NOTE — CONSULTS
NEPHROLOGY CONSULTATION      ASSESSMENT/PLAN:  87 year old female with PMH of HTN, HLD , and recent COVID treated with Paxlovid , presented with acute hyponatremia with nausea , poor appetite and weakness with foggy brain feeling. Nephrology consulted for acute hyponatremia    Acute hyponatremia  Baseline sodium is 134-138 mostly  Has had a sodium down to 128 in 10/2020 with some NSAIDS and severe back pain/narcs  Mild symptoms   Sodium 120 on admission darryl to 123 with 1L NS bolus  Follow on serum osm , urine osm and urine sodium  UA with sp gravity 1.006 very dilute   This may be a tea and toast diet , solute deficiency rather then SIADH , no reports of low UO or dark urine , was mostly pushing water and juices since has no appetite  -- follow serum sodium Q4hrs  -- sodium 123 after initial bolus , for now will hold off any NS  -- keep on 1200 FW restriction ( will avoid calculating meals in it if she orders soups)  -- goal to keep <128 till 6am tomorrow  plz page nephrology if over correcting    Mild hypochloremia   Mild low bicarb  -- monitor for now  -- will be suggestive of a more low solute state    Hypomagnesemia  Will give IV 1gm    Preserved renal functions    HTN , clinically euvolemic   Elevated BP missed am meds  On losartan, atenolol and Norvasc at home  Continue PTA meds    HLD  On statins    Recent COVID treated with Paxlovid  Common s/e of it include nausea and dysgeusia along with HTN  Unclear if COVID and the med triggered the nausea symptoms  -- management per primary medicine team    Thank you for the consultation we will follow  Berenice Fontaine MD  [facility]  [phone]      CC: Acute hyponatremia    REASON FOR CONSULTATION: We are asked to see pt by Dr. Escamilla    HISTORY OF PRESENT ILLNESS:87 year old female    87 year old female with PMH of HTN, HLD , and recent COVID treated with Paxlovid , presented with acute hyponatremia with nausea , poor appetite and  weakness with foggy brain feeling. Nephrology consulted for acute hyponatremia  Patient has excellent functional baseline drives and takes care of her  as well she reports she and her  contracted COVID 2 weeks ago, after starting Paxlovid she started feeling sick with nausea poor appetite and poor taste, she has been mostly drinking 7-Up with orange juice or ginger ale to overcome those symptoms mostly drinking water with an intense feeling of dry mouth  Denies any vomiting or diarrhea but did have intense nausea  She also reports that she has been feeling more foggy which she thought was secondary to COVID she does feel more achy and weak in her upper and lower extremities  History mostly obtained from the patient and her daughter at bedside  Does have a history of one-time severe hyponatremia which was treated in urgent care otherwise no chronic hyponatremia did discuss with them that she has low normal sodiums at baseline    REVIEW OF SYSTEMS:  ROS was completely reviewed and otherwise negative and non-contributory    History reviewed. No pertinent past medical history.    Social History     Socioeconomic History     Marital status:      Spouse name: Not on file     Number of children: Not on file     Years of education: Not on file     Highest education level: Not on file   Occupational History     Not on file   Tobacco Use     Smoking status: Former Smoker     Packs/day: 0.00     Smokeless tobacco: Never Used   Substance and Sexual Activity     Alcohol use: Yes     Comment: Alcoholic Drinks/day: occasional     Drug use: Not on file     Sexual activity: Not on file   Other Topics Concern     Not on file   Social History Narrative     Not on file     Social Determinants of Health     Financial Resource Strain: Not on file   Food Insecurity: Not on file   Transportation Needs: Not on file   Physical Activity: Not on file   Stress: Not on file   Social Connections: Not on file   Intimate  "Partner Violence: Not on file   Housing Stability: Not on file       Family History   Problem Relation Age of Onset     Skin Cancer Father      Stomach Cancer Maternal Grandmother        Allergies   Allergen Reactions     Fosinopril Cough       MEDICATIONS:    amLODIPine  5 mg Oral Daily     atenolol  25 mg Oral Daily     atorvastatin  10 mg Oral At Bedtime     calcium carbonate  500 mg Oral Daily     calcium carbonate 500 mg (elemental)  500 mg Oral Daily     losartan  100 mg Oral Daily     ondansetron  4 mg Intravenous Once     sodium chloride (PF)  3 mL Intracatheter Q8H     Vitamin D3  50 mcg Oral Daily         PHYSICAL EXAM    BP (!) 191/88 (BP Location: Right arm)   Pulse 76   Temp 97.8  F (36.6  C) (Oral)   Resp 20   Ht 1.575 m (5' 2\")   Wt 49.9 kg (110 lb)   SpO2 98%   BMI 20.12 kg/m        Intake/Output Summary (Last 24 hours) at 7/23/2022 1522  Last data filed at 7/23/2022 1200  Gross per 24 hour   Intake 1780 ml   Output --   Net 1780 ml       Alert/; awake and NAD  HEENT NC/AT; perrla; OP clear without lesions; mmm  Neck supple without LAD, TM  CV; RRR without rub or murmur  Lung: clear and equal; no extra sounds  Ab: soft and NT; not distended; normal bs  Ext: no edema and well perfused  Skin; no rash  Neuro; grossly intact    LABORATORIES    Recent Labs   Lab 07/23/22  0506   WBC 6.9   HGB 13.5   HCT 38.5        Recent Labs   Lab 07/23/22  1349 07/23/22  1108 07/23/22  0506   * 123* 120*   CO2  --   --  21*   BUN  --   --  5*   ALKPHOS  --   --  70   ALT  --   --  23   AST  --   --  20     No results for input(s): INR, PTT in the last 168 hours.    Invalid input(s): APTT  Invalid input(s): FERRITIN  No results for input(s): IRON in the last 168 hours.    Invalid input(s): TIBC    I reviewed all labs and imaging    Thank you for the consultation we will follow    Berenice Fontaine MD  [facility]  [phone]      "

## 2022-07-23 NOTE — ED TRIAGE NOTES
Here with covid concerns, including loss of appetite, insomnia, nausea, and weakness that have progressively gotten worse since testing positive for covid on 7/9/22      Triage Assessment     Row Name 07/23/22 0331       Triage Assessment (Adult)    Airway WDL WDL       Respiratory WDL    Respiratory WDL WDL       Skin Circulation/Temperature WDL    Skin Circulation/Temperature WDL WDL       Cardiac WDL    Cardiac WDL WDL       Peripheral/Neurovascular WDL    Peripheral Neurovascular WDL WDL       Cognitive/Neuro/Behavioral WDL    Cognitive/Neuro/Behavioral WDL WDL

## 2022-07-23 NOTE — ED PROVIDER NOTES
EMERGENCY DEPARTMENT ENCOUNTER      NAME: Martha Chamorro  AGE: 87 year old female  YOB: 1935  MRN: [id number]  EVALUATION DATE & TIME: 2022  4:52 AM    PCP: Raul Lewis    ED PROVIDER: Tai Barrios M.D.      Chief Complaint   Patient presents with     Covid Concern       FINAL IMPRESSION:  1. Hyponatremia        ED COURSE & MEDICAL DECISION MAKIN year old female presents to the Emergency Department for evaluation of insomnia and decreased oral intake.  She is vitally stable and nontoxic-appearing when she arrives to the emergency department.  Has been struggling with loss of appetite taste smell and insomnia for the last couple of weeks after being diagnosed with COVID-19, worsened after taking pack Slo-Bid.  She underwent some lab testing here which included fairly significant hyponatremia to 120.  Suspect this is either euvolemic or hypovolemic probably related to decreased solute intake.  She was started on a 1 L fluid bolus for now.  The rest of her basic lab evaluation here looks stable and reassuring.  She seems quite stable from a respiratory standpoint and I do not suspect any complicating feature right now like a superimposed bacterial illness or pulmonary embolism.  I do think given the severity of her hyponatremia she should be admitted for monitoring, serial sodiums, and to make sure that she does not decline further.  Discussed case with hospitalist.  Patient was in agreement with the plan    5:26 AM I met with the patient, obtained history, performed an initial exam, and discussed options and plan for diagnostics and treatment here in the ED.     At the conclusion of the encounter I discussed the results of all of the tests and the disposition. The questions were answered. The patient or family acknowledged understanding and was agreeable with the care plan.     0 minutes of critical care time     MEDICATIONS GIVEN IN THE EMERGENCY:  Medications   ondansetron  (ZOFRAN) injection 4 mg (has no administration in time range)   0.9% sodium chloride BOLUS (0 mLs Intravenous Stopped 7/23/22 0606)       NEW PRESCRIPTIONS STARTED AT TODAY'S ER VISIT  New Prescriptions    No medications on file          =================================================================    HPI    Patient information was obtained from: Patient    Use of : N/A       Martha Chamorro is a 87 year old female with a pertinent history of HTN and HLP, who presents to this ED via walk in for evaluation of Covid-19 concern.    The patient reports testing positive for Covid-19 on 7/9/22.  She reports since then she has struggled with anorexia.  She has been very nauseated and not eating well.  She reports loss of taste and smell.  She has recently developed insomnia and feels very rundown.  She denies any shortness of breath, cough, fevers, or other complaints at this time .     REVIEW OF SYSTEMS   All systems reviewed and negative except as noted in HPI.    PAST MEDICAL HISTORY:  No past medical history on file.    PAST SURGICAL HISTORY:  Past Surgical History:   Procedure Laterality Date     HYSTERECTOMY  [id number]     OOPHORECTOMY  [id number]       CURRENT MEDICATIONS:    Current Facility-Administered Medications   Medication     ondansetron (ZOFRAN) injection 4 mg     Current Outpatient Medications   Medication     acetaminophen (TYLENOL) 500 MG tablet     amLODIPine (NORVASC) 5 MG tablet     atenolol (TENORMIN) 25 MG tablet     atorvastatin (LIPITOR) 10 MG tablet     Calcium Carb-Ergocalciferol 500-200 MG-UNIT TABS     cholecalciferol 50 MCG (2000 UT) CAPS     losartan (COZAAR) 100 MG tablet         ALLERGIES:  Allergies   Allergen Reactions     Fosinopril Cough       FAMILY HISTORY:  Family History   Problem Relation Age of Onset     Skin Cancer Father      Stomach Cancer Maternal Grandmother        SOCIAL HISTORY:   Social History     Socioeconomic History     Marital status:    Tobacco  "Use     Smoking status: Former Smoker     Packs/day: 0.00     Smokeless tobacco: Never Used   Substance and Sexual Activity     Alcohol use: Yes     Comment: Alcoholic Drinks/day: occasional       VITALS:  BP (!) 150/70   Pulse 72   Temp 97.2  F (36.2  C) (Temporal)   Resp 16   Ht 1.575 m (5' 2\")   Wt 49.9 kg (110 lb)   SpO2 98%   BMI 20.12 kg/m      PHYSICAL EXAM    Constitutional: Well developed, Well nourished, NAD.  HENT: Normocephalic, Atraumatic. Neck Supple.  Eyes: EOMI, Conjunctiva normal.  Respiratory: Breathing comfortably on room air. Speaks full sentences easily. Lungs clear to ascultation.  Cardiovascular: Normal heart rate, Regular rhythm. No peripheral edema.  Abdomen: Soft, nontender  Musculoskeletal: Good range of motion in all major joints. No major deformities noted.  Integument: Warm, Dry.  Neurologic: Alert & awake, Normal motor function, Normal sensory function, No focal deficits noted.   Psychiatric: Cooperative. Affect appropriate.     LAB:  All pertinent labs reviewed and interpreted.  Labs Ordered and Resulted from Time of ED Arrival to Time of ED Departure   COMPREHENSIVE METABOLIC PANEL - Abnormal       Result Value    Sodium 120 (*)     Potassium 3.9      Chloride 86 (*)     Carbon Dioxide (CO2) 21 (*)     Anion Gap 13      Urea Nitrogen 5 (*)     Creatinine 0.58 (*)     Calcium 9.4      Glucose 142 (*)     Alkaline Phosphatase 70      AST 20      ALT 23      Protein Total 7.9      Albumin 4.1      Bilirubin Total 0.5      GFR Estimate 88     MAGNESIUM - Abnormal    Magnesium 1.7 (*)    CBC WITH PLATELETS AND DIFFERENTIAL - Normal    WBC Count 6.9      RBC Count 4.25      Hemoglobin 13.5      Hematocrit 38.5      MCV 91      MCH 31.8      MCHC 35.1      RDW 11.9      Platelet Count 262     DIFFERENTIAL    % Neutrophils 71      % Lymphocytes 19      % Monocytes 10      % Eosinophils 0      % Basophils 0      Absolute Neutrophils 4.9      Absolute Lymphocytes 1.3      Absolute " Monocytes 0.7      Absolute Eosinophils 0.0      Absolute Basophils 0.0      RBC Morphology Confirmed RBC Indices      Platelet Assessment        Value: Automated Count Confirmed. Platelet morphology is normal.       RADIOLOGY:  Reviewed all pertinent imaging. Please see official radiology report.  No orders to display     I, Mundo Lucero, am serving as a scribe to document services personally performed by Dr. Tai Barrios, based on my observation and the provider's statements to me. I, Tai Barrios MD attest that Mundo Lucero is acting in a scribe capacity, has observed my performance of the services and has documented them in accordance with my direction.    Tai Barrios M.D.  Emergency Medicine  [facility] EMERGENCY ROOM  [facility] [id number] [phone]  Dept: [phone]        Tai Barrios MD  07/23/22 0719

## 2022-07-24 LAB
OSMOLALITY UR: 174 MMOL/KG (ref 100–1200)
SODIUM SERPL-SCNC: 126 MMOL/L (ref 136–145)
SODIUM SERPL-SCNC: 126 MMOL/L (ref 136–145)
SODIUM SERPL-SCNC: 127 MMOL/L (ref 136–145)
SODIUM SERPL-SCNC: 128 MMOL/L (ref 136–145)
SODIUM SERPL-SCNC: 128 MMOL/L (ref 136–145)

## 2022-07-24 PROCEDURE — 250N000013 HC RX MED GY IP 250 OP 250 PS 637: Performed by: HOSPITALIST

## 2022-07-24 PROCEDURE — 99232 SBSQ HOSP IP/OBS MODERATE 35: CPT | Performed by: NURSE PRACTITIONER

## 2022-07-24 PROCEDURE — 36415 COLL VENOUS BLD VENIPUNCTURE: CPT | Performed by: HOSPITALIST

## 2022-07-24 PROCEDURE — 84295 ASSAY OF SERUM SODIUM: CPT | Performed by: HOSPITALIST

## 2022-07-24 PROCEDURE — 36415 COLL VENOUS BLD VENIPUNCTURE: CPT | Performed by: NURSE PRACTITIONER

## 2022-07-24 PROCEDURE — 84295 ASSAY OF SERUM SODIUM: CPT | Performed by: NURSE PRACTITIONER

## 2022-07-24 PROCEDURE — 120N000001 HC R&B MED SURG/OB

## 2022-07-24 RX ORDER — ENOXAPARIN SODIUM 100 MG/ML
40 INJECTION SUBCUTANEOUS EVERY 24 HOURS
Status: DISCONTINUED | OUTPATIENT
Start: 2022-07-24 | End: 2022-07-25 | Stop reason: HOSPADM

## 2022-07-24 RX ADMIN — Medication 50 MCG: at 08:55

## 2022-07-24 RX ADMIN — CALCIUM CARBONATE (ANTACID) CHEW TAB 500 MG 500 MG: 500 CHEW TAB at 08:55

## 2022-07-24 RX ADMIN — ATORVASTATIN CALCIUM 10 MG: 10 TABLET, FILM COATED ORAL at 21:27

## 2022-07-24 RX ADMIN — HYDROXYZINE HYDROCHLORIDE 10 MG: 10 TABLET ORAL at 21:27

## 2022-07-24 RX ADMIN — CALCIUM 500 MG: 500 TABLET ORAL at 08:55

## 2022-07-24 ASSESSMENT — ACTIVITIES OF DAILY LIVING (ADL)
ADLS_ACUITY_SCORE: 23

## 2022-07-24 NOTE — PROGRESS NOTES
[facility] MEDICINE PROGRESS NOTE      Martha Chamorro is a 87 year old old female with history of HTN, HLD, presented with acute hyponatremia with Na at 120. Nephrology consultation much appreciated. ED already ordered/administered a full 1000 mL bolus. Recheck Na now at admission before any further fluids, and then check Na q4H; urine osmolality and urine Na ordered. Aim for 6-8 mEq within next 24-hr to avoid overly-rapid correction. Discussed this with Martha and her daughter at-length.   Appreciate nephrology consult at this time.  The patient is under 1200 FW restriction.  There is a possibility that her hyponatremia is likely due to a solute deficiency rather than SIADH and the patient had been and taking large amounts of tea and pushing water and juices with very minimal appetite.  Now her sodium level was 126 on 7/24/2022.     Hyponatremia, moderate-severe  Initial Na 120  -By chart review, history, physical exam, and labs, hyponatremia is likely hypotonic and hypovolemic.   -Most likely etiology is hypovolemic, although may be multifactorial as well, pending initial labs.  -Initial Na noted at 120.  Now noted to be 126  -Plan for Na checks: q4H.  -Order urine Na, urine osmolality to further evaluate etiology.   -Nephrology consultation appreciated. This may be a tea and toast diet , solute deficiency rather then SIADH , no reports of low UO or dark urine , was mostly pushing water and juices since has no appetite  -Aim for 6-8 mEq within next 24-hr to avoid overly-rapid correction  -Currently continuing with 1200 fluid w restriction.     HLD  -Order home statin.     HTN  -Order home medications and as needed apply specified hold parameters.      Recovered COVID19 Infection  -14-days ago prior to admission; she completed Paxlovid course.   -No immunocompromised risk/history, so 10-days post is considered recovered.      Clinically Significant Risk Factors Present on Admission                 # Hyponatremia: Na = 120 mmol/L (Ref range: 136 - 145 mmol/L) on admission, will monitor as appropriate- nw 126         # Hypertension: home medication list includes antihypertensive(s)          DVTP: Mechanical Prophylaxis/ Sequential Compression Devices, Lovenox 40 mg SubQ every day   Code Status: No Order  Disposition: Inpatient       Nikolay Frazier MD  [facility] Medicine  [facility]  Phone: #[phone]    Interval History/Subjective:    Patient overall anxious today.    Lab work showed a sodium level of 126 this morning.    Physical Exam/Objective:  Temp:  [97.6  F (36.4  C)-98.4  F (36.9  C)] 97.7  F (36.5  C)  Pulse:  [52-79] 57  Resp:  [16-20] 16  BP: (113-191)/(50-88) 117/50  SpO2:  [96 %-99 %] 98 %  Body mass index is 18.97 kg/m .    GENERAL:  Alert, appears comfortable, in no acute distress, appears stated age   HEAD:  Normocephalic, without obvious abnormality, atraumatic   EYES:  PERRL, conjunctiva/corneas clear, no scleral icterus, EOM's intact   NOSE: Nares normal, septum midline, mucosa normal, no drainage   THROAT: Lips, mucosa, and tongue normal; teeth and gums normal, mouth moist   NECK: Supple, symmetrical, trachea midline   BACK:   Symmetric, no curvature, ROM normal   LUNGS:   Clear to auscultation bilaterally, no rales, rhonchi, or wheezing, symmetric chest rise on inhalation, respirations unlabored   CHEST WALL:  No tenderness or deformity   HEART:  Regular rate and rhythm, S1 and S2 normal, no murmur, rub, or gallop    ABDOMEN:   Soft, non-tender, bowel sounds active all four quadrants, no masses, no organomegaly, no rebound or guarding   EXTREMITIES: Extremities normal, atraumatic, no cyanosis or edema    SKIN: Dry to touch, no exanthems in the visualized areas   NEURO: Alert, oriented x3, moves all four extremities freely   PSYCH: Cooperative, behavior is appropriate      Data reviewed today: I personally reviewed all new medications, labs,  imaging/diagnostics reports over the past 24 hours. Pertinent findings include:    Imaging:   No results found for this or any previous visit (from the past 24 hour(s)).    Labs:  Most Recent 3 CBC's:Recent Labs   Lab Test 07/23/22  0506 11/04/21  0956 09/30/20  1050   WBC 6.9 8.4 6.7   HGB 13.5 12.7 13.5   MCV 91 99 98    225 263     Most Recent 3 BMP's:Recent Labs   Lab Test 07/24/22  0945 07/24/22  0631 07/24/22  0157 07/23/22  1108 07/23/22  0506 11/04/21  0956 05/14/21  0901   * 126* 127*   < > 120* 138 142   POTASSIUM  --   --   --   --  3.9 4.2 4.2   CHLORIDE  --   --   --   --  86* 102 104   CO2  --   --   --   --  21* 26 27   BUN  --   --   --   --  5* 11 10   CR  --   --   --   --  0.58* 0.78 0.71   ANIONGAP  --   --   --   --  13 10 11   FANNIE  --   --   --   --  9.4 10.1 9.6   GLC  --   --   --   --  142* 110 117    < > = values in this interval not displayed.     Most Recent 2 LFT's:Recent Labs   Lab Test 07/23/22  0506 11/04/21  0956   AST 20 21   ALT 23 16   ALKPHOS 70 63   BILITOTAL 0.5 0.7     Most Recent 3 INR's:No lab results found.    Medications:   Personally Reviewed.  Medications       amLODIPine  5 mg Oral Daily     atenolol  25 mg Oral Daily     atorvastatin  10 mg Oral At Bedtime     calcium carbonate  500 mg Oral Daily     calcium carbonate 500 mg (elemental)  500 mg Oral Daily     losartan  100 mg Oral Daily     ondansetron  4 mg Intravenous Once     sodium chloride (PF)  3 mL Intracatheter Q8H     Vitamin D3  50 mcg Oral Daily

## 2022-07-24 NOTE — PROGRESS NOTES
RENAL PROGRESS NOTE    CC:  Weakness, nausea    ASSESSMENT & PLAN:   87 year old female with PMH of HTN, HLD , and recent COVID treated with Paxlovid , presented with acute hyponatremia with nausea , poor appetite and weakness with foggy brain feeling. Nephrology consulted for acute hyponatremia     Acute hyponatremia  Baseline sodium is 134-138 mostly  Has had a sodium down to 128 in 10/2020 with some NSAIDS and severe back pain/narcs  Mild symptoms   Sodium 120 on admission darryl to 123 with 1L NS bolus  UA with sp gravity 1.006 very dilute   This may be a tea and toast diet , solute deficiency rather then SIADH , no reports of low UO or dark urine , was mostly pushing water and juices since has no appetite  Na improving, 128  -- follow serum sodium Q8hrs   -- keep on 1200 FW restriction   -- target Na up to ~134 today.     Mild hypochloremia   Mild low bicarb  -- monitor for now  -- will be suggestive of a more low solute state     Hypomagnesemia  Given IV 1gm mag      Preserved renal functions     HTN , clinically euvolemic  BP controlled.   On losartan, atenolol and Norvasc at home->ordered here with hold parameters       HLD  On statin     Recent COVID treated with Paxlovid  Common s/e of it include nausea and dysgeusia along with HTN  Unclear if COVID and the med triggered the nausea symptoms  -- management per primary medicine team       SUBJECTIVE:  Feeling better compared to admission->less weak, less nausea.  Still not much appetite and altered taste sensation.   No SOB  No edema  No dizziness.  Reviewed steady/improving Na.  Discussed importance of solute intake and avoid excessive amounts of free water.  Pt requesting gatorade->ok.   Discussed plan with RN as well.       OBJECTIVE:  Physical Exam   Temp: 97.7  F (36.5  C) Temp src: Oral BP: 117/50 Pulse: 57   Resp: 16 SpO2: 98 % O2 Device: None (Room air)    Vitals:    07/23/22 0329 07/23/22 1710 07/24/22 0500   Weight: 49.9 kg (110 lb) 44.5 kg (98  lb 1.6 oz) 47 kg (103 lb 11.2 oz)     Vital Signs with Ranges  Temp:  [97.6  F (36.4  C)-98.4  F (36.9  C)] 97.7  F (36.5  C)  Pulse:  [52-79] 57  Resp:  [16-20] 16  BP: (113-191)/(50-88) 117/50  SpO2:  [96 %-99 %] 98 %  I/O last 3 completed shifts:  In: 1030 [P.O.:1030]  Out: 150 [Urine:150]        Patient Vitals for the past 72 hrs:   Weight   07/24/22 0500 47 kg (103 lb 11.2 oz)   07/23/22 1710 44.5 kg (98 lb 1.6 oz)   07/23/22 0329 49.9 kg (110 lb)   [unfilled]    PHYSICAL EXAM:  General - Alert and oriented x3, appears comfortable, NAD  Cardiovascular - RRR  Respiratory - clear, normal effort, room air.  Abd: +bs, soft  Extremities - no edema  Skin: warm, dry  Neuro:  Grossly intakct  MSK:  Grossly intact  Psych:  calm, appropriate affect     LABORATORY STUDIES:     Recent Labs   Lab 07/23/22  0506   WBC 6.9   RBC 4.25   HGB 13.5   HCT 38.5          Basic Metabolic Panel:  Recent Labs   Lab 07/24/22  0945 07/24/22  0631 07/24/22  0157 07/23/22  2156 07/23/22  1806 07/23/22  1349 07/23/22  1108 07/23/22  0506   * 126* 127* 127* 126* 123*   < > 120*   POTASSIUM  --   --   --   --   --   --   --  3.9   CHLORIDE  --   --   --   --   --   --   --  86*   CO2  --   --   --   --   --   --   --  21*   BUN  --   --   --   --   --   --   --  5*   CR  --   --   --   --   --   --   --  0.58*   GLC  --   --   --   --   --   --   --  142*   FANNIE  --   --   --   --   --   --   --  9.4    < > = values in this interval not displayed.       INRNo lab results found in last 7 days.     Recent Labs   Lab Test 07/23/22  0506 11/04/21  0956   WBC 6.9 8.4   HGB 13.5 12.7    225       Personally reviewed current labs       Anu Ervin NP  [facility]  [phone]

## 2022-07-24 NOTE — PLAN OF CARE
Problem: Electrolyte Imbalance  Goal: Electrolyte Balance  Outcome: Ongoing, Progressing     Last Na 126. Patient feels better again today. Steady on her feet, up independently. No vision issues, alert and oriented. Vitally stable, held morning BP meds per parameters.     Decreased appetite as she continues to have minimal taste. Ate some eggs for breakfast with 250 mL orange juice. Lunch was some ice cream with a cookie and 100 mL coffee. Has not voided since 0600, isn't drinking as much as her normal with fluid restriction in place.    Daughter Isa at bedside, understandably concerned about patient's plan of care. RN answered questions as able. Nephrology to round this afternoon; daughter at bedside awaiting rounds.

## 2022-07-24 NOTE — PLAN OF CARE
Problem: Electrolyte Imbalance  Goal: Electrolyte Balance  Outcome: Ongoing, Progressing     Problem: Activity Intolerance  Goal: Enhanced Capacity and Energy  Intervention: Optimize Activity Tolerance    Problem: Anxiety  Goal: Anxiety Reduction or Resolution  Outcome: Ongoing, Progressing     Vital signs stable overnight. Cares clustered overnight with lab draws to promote rest. Patient reports she slept well. Patient alert and orientated. Nausea has resolved. Patient only drank a few sips of water overnight to save her fluid restriction allowance for the daytime. Patient reports her strength is improving, up with stand-by assist.

## 2022-07-25 VITALS
SYSTOLIC BLOOD PRESSURE: 143 MMHG | DIASTOLIC BLOOD PRESSURE: 66 MMHG | HEART RATE: 69 BPM | BODY MASS INDEX: 19.1 KG/M2 | TEMPERATURE: 97.9 F | HEIGHT: 62 IN | WEIGHT: 103.8 LBS | OXYGEN SATURATION: 100 % | RESPIRATION RATE: 18 BRPM

## 2022-07-25 LAB
SODIUM SERPL-SCNC: 130 MMOL/L (ref 136–145)
SODIUM SERPL-SCNC: 130 MMOL/L (ref 136–145)

## 2022-07-25 PROCEDURE — 99232 SBSQ HOSP IP/OBS MODERATE 35: CPT | Performed by: PHYSICIAN ASSISTANT

## 2022-07-25 PROCEDURE — 99239 HOSP IP/OBS DSCHRG MGMT >30: CPT | Performed by: HOSPITALIST

## 2022-07-25 PROCEDURE — 36415 COLL VENOUS BLD VENIPUNCTURE: CPT | Performed by: NURSE PRACTITIONER

## 2022-07-25 PROCEDURE — 250N000013 HC RX MED GY IP 250 OP 250 PS 637: Performed by: HOSPITALIST

## 2022-07-25 PROCEDURE — 84295 ASSAY OF SERUM SODIUM: CPT | Performed by: NURSE PRACTITIONER

## 2022-07-25 RX ORDER — HYDROXYZINE HYDROCHLORIDE 10 MG/1
10 TABLET, FILM COATED ORAL EVERY 4 HOURS PRN
Qty: 30 TABLET | Refills: 0 | Status: SHIPPED | OUTPATIENT
Start: 2022-07-25 | End: 2022-08-12

## 2022-07-25 RX ADMIN — CALCIUM CARBONATE (ANTACID) CHEW TAB 500 MG 500 MG: 500 CHEW TAB at 09:14

## 2022-07-25 RX ADMIN — AMLODIPINE BESYLATE 5 MG: 5 TABLET ORAL at 09:15

## 2022-07-25 RX ADMIN — HYDROXYZINE HYDROCHLORIDE 10 MG: 10 TABLET ORAL at 10:15

## 2022-07-25 RX ADMIN — Medication 50 MCG: at 09:14

## 2022-07-25 RX ADMIN — LOSARTAN POTASSIUM 100 MG: 50 TABLET, FILM COATED ORAL at 09:15

## 2022-07-25 RX ADMIN — CALCIUM 500 MG: 500 TABLET ORAL at 09:14

## 2022-07-25 RX ADMIN — ATENOLOL 25 MG: 25 TABLET ORAL at 09:15

## 2022-07-25 RX ADMIN — HYDROXYZINE HYDROCHLORIDE 10 MG: 10 TABLET ORAL at 02:52

## 2022-07-25 ASSESSMENT — ACTIVITIES OF DAILY LIVING (ADL)
ADLS_ACUITY_SCORE: 23
ADLS_ACUITY_SCORE: 22
ADLS_ACUITY_SCORE: 23
ADLS_ACUITY_SCORE: 22
ADLS_ACUITY_SCORE: 23
ADLS_ACUITY_SCORE: 23
ADLS_ACUITY_SCORE: 22
ADLS_ACUITY_SCORE: 23

## 2022-07-25 NOTE — PROGRESS NOTES
"CLINICAL NUTRITION SERVICES - ASSESSMENT NOTE     Nutrition Prescription    RECOMMENDATIONS FOR MDs/PROVIDERS TO ORDER:  None at this time.    Malnutrition Status:    Pt does not meet criteria    Recommendations already ordered by Registered Dietitian (RD):  Encouraged PO. No other nutrition intervention appropriate at this time.    Future/Additional Recommendations:  None     REASON FOR ASSESSMENT  Martha Chamorro is a/an 87 year old female assessed by the dietitian for Admission Nutrition Risk Screen for unintentional wt loss and poor PO.    Pt admitted for hyponatremia.    PMHx of HTN, HLD, and COVID-19 (recovered 14 days prior to admission).    NUTRITION HISTORY  NKFA    RD met with pt to assess recent wt loss and poor PO. Pt reported variable but overall poor appetite since rodrigo COVID on 7/4/22. Normally, pt consumes 3 meals/d but during this time she would consume a few bites of 1-2 meals/d. She consumed mostly jello and sherbert. Pt denies any N/V but food just had no appeal. Pt also did not sleep well during this time. Pt noticed fat loss in her abdominal region. Pt reported recent weakness in lower extremities and feeling unstable. Normally, she is moderately active and fully ambulatory. Currently, appetite has improved slightly. Pt is consumes 75% of 3 small meals/d. Pt is discharging later today, so no nutrition intervention is appropriate at this time.    CURRENT NUTRITION ORDERS  Diet: Regular  Intake/Tolerance: 75%; fair appetite; ordering 3 <adequate meals/d    LABS  Labs reviewed; 7/25/22    Na: 130 (L) - trending upwards    MEDICATIONS  Medications reviewed; tums, oscal, vit D3    ANTHROPOMETRICS  Height: 157.5 cm (5' 2\")  Most Recent Weight: 47.1 kg (103 lb 12.8 oz)    IBW: 50 kg  BMI: Normal BMI (18.99)  Weight History:   Wt Readings from Last 5 Encounters:   07/25/22 47.1 kg (103 lb 12.8 oz)   11/04/21 47.6 kg (105 lb)   05/14/21 48.1 kg (106 lb)   12/07/20 45.4 kg (100 lb)   09/24/19 49.9 " kg (110 lb)     Relatively stable at low wt    Dosing Weight: 50 kg (IBW)    ASSESSED NUTRITION NEEDS  Estimated Energy Needs: 7474-2064 kcals/day (25 - 30 kcals/kg)  Justification: Maintenance  Estimated Protein Needs: 50-60 grams protein/day (1 - 1.2 grams of pro/kg)  Justification: Maintenance  Estimated Fluid Needs: 3585-7011 mL/day (1 mL/kcal)   Justification: Maintenance    PHYSICAL FINDINGS  See malnutrition section below.    Edema: nothing noted in chart  GI: WDL  Skin: WDL      MALNUTRITION:  % Weight Loss:  Weight loss does not meet criteria for malnutrition   % Intake:  </= 75% for >/= 1 month (severe malnutrition)  Subcutaneous Fat Loss:  None observed  Muscle Loss:  None observed  Fluid Retention:  None noted    Malnutrition Diagnosis: Patient does not meet two of the above criteria necessary for diagnosing malnutrition      NUTRITION DIAGNOSIS  Inadequate oral intake related to poor appetite secondary to COVID-19 infection as evidenced by </= 75% for >/= 1 month.     INTERVENTIONS  Implementation  Nutrition education for nutrition relationship to health/disease     Goals  Patient to consume % of nutritionally adequate meals three times per day, or the equivalent with supplements/snacks.     Monitoring/Evaluation  Progress toward goals will be monitored and evaluated per protocol.

## 2022-07-25 NOTE — PLAN OF CARE
Pt meets discharge criteria     AOx4. VSS on RA. Up ad sue independently. Denies pain. Tolerating regular diet; low appetite. Denies N/V. Continent of B/B; pt voiding in BR; monitoring I/Os. Pt on 1200 ml fluid restriction. x1 atarax; available next after 1400 today. Pulled IV. Na this AM; 130; recheck at 1400 before discharge. Daughter at bedside.     I reviewed discharge instructions with patient and answered any remaining questions     Pt was discharged with all personal belongings.

## 2022-07-25 NOTE — PROGRESS NOTES
"Per patient care order; \"Ok to discharge from renal standpoint if cleared by primary hospitalist. Repeat BMP within 3 days of discharge\" -Marty Leslie PA-C     Notified Dr. Escamilla regarding discharge  "

## 2022-07-25 NOTE — DISCHARGE SUMMARY
[facility] MEDICINE  DISCHARGE SUMMARY     Primary Care Physician: Raul Lewis  Admission Date: 7/23/2022   Discharge Provider: Brady Escamilla MD Discharge Date: 7/25/2022   Diet:   Active Diet and Nourishment Order   Procedures     Regular Diet Adult     Diet       Code Status: Full Code   Activity: DCACTIVITY: Activity as tolerated and no driving for today        Condition at Discharge: Good     REASON FOR PRESENTATION(See Admission Note for Details)   Martha Chamorro is a 87 year old old female with history of HTN, HLD, presented with acute hyponatremia with Na at 120. Nephrology consulted; deemed likely that her hyponatremia was due to a solute deficiency as the patient had been and taking large amounts of tea and pushing water and juices with very minimal appetite and based on her urine studies.     PRINCIPAL & ACTIVE DISCHARGE DIAGNOSES     Principal Problem:    Hyponatremia  Active Problems:    Essential hypertension    Mixed hyperlipidemia    Personal history of COVID-19    Tea and Toast Hyponatremia, low solute intake     PENDING LABS     Unresulted Labs Ordered in the Past 30 Days of this Admission     No orders found from 6/23/2022 to 7/24/2022.            PROCEDURES ( this hospitalization only)          RECOMMENDATIONS TO OUTPATIENT PROVIDER FOR F/U VISIT     Follow-up Appointments     Follow-up and recommended labs and tests      Follow up with primary care provider, Raul Lewis, within 7 days for   hospital follow- up.    The following labs/tests are recommended: BMP (basic metabolic panel)   including the sodium level (Na).    Continue the 1200 mL fluid restriction diet and follow-up with your own   PCP and the BMP lab (basic metabolic panel) with a sodium level should be   rechecked within 5-7 days.             Follow-up with anxiety management: Hydroxyzine PRN for anxiety was prescribed at discharged as it was very effective for patient while hospitalized; PCP to  further evaluate and re-assess.     DISPOSITION     Home    SUMMARY OF HOSPITAL COURSE:      Martha Chamorro is a 87 year old old female with history of HTN, HLD, presented with acute hyponatremia with Na at 120. Nephrology consulted; deemed likely that her hyponatremia was due to a solute deficiency as the patient had been and taking large amounts of tea and pushing water and juices with very minimal appetite and based on her urine studies.     Nephrology guided treatment with fluid restriction placed (1200 mL), once urine studies resulted and did not suggest SIADH or hypovolemia. With fluid restriction and advancement of oral intake, Na improved slowly at goal rate, from 120 at admission to 126 within first 24-hr, and then to 130 by 48-hrs. Clinically improved with clearing mentation and she returned back to baseline as corroborated by her daughter/HCA as well by day of discharge. Nephrology suggested a final goal of Na 134 but Martha and her daughter wished to go home and felt comfortable and confident with continuing her 1200 mL fluid restriction at home, and plan to follow-up with her PCP with BMP repeated there. Continued treatment with q8H Na monitoring for another night until Na to 134 or greater was offered, as well as [facility] BMP lab check, but Martha and her daughter declined and wish to follow-up with her established PCP with a BMP done there within 5-7 days or sooner.     Discharge Medications with Med changes:     Current Discharge Medication List      CONTINUE these medications which have NOT CHANGED    Details   acetaminophen (TYLENOL) 500 MG tablet Take 500-1,000 mg by mouth every 6 hours as needed for mild pain      amLODIPine (NORVASC) 5 MG tablet Take 1 tablet (5 mg) by mouth daily  Qty: 90 tablet, Refills: 3    Associated Diagnoses: Essential hypertension      atenolol (TENORMIN) 25 MG tablet Take 1 tablet (25 mg total) by mouth daily.  Qty: 90 tablet, Refills: 3    Associated  Diagnoses: Essential hypertension      atorvastatin (LIPITOR) 10 MG tablet Take 1 tablet (10 mg) by mouth At Bedtime  Qty: 90 tablet, Refills: 3    Associated Diagnoses: Hypercholesterolemia      calcium carbonate (OS-FANNIE) 1500 (600 Ca) MG tablet Take 600 mg by mouth daily      calcium carbonate (TUMS) 500 MG chewable tablet Take 1 chew tab by mouth daily      cholecalciferol 50 MCG (2000 UT) CAPS Take 1 capsule by mouth daily       diphenhydrAMINE (BENADRYL) 25 MG tablet Take 50 mg by mouth nightly as needed for itching or allergies      losartan (COZAAR) 100 MG tablet Take 1 tablet (100 mg total) by mouth daily.  Qty: 90 tablet, Refills: 3    Associated Diagnoses: Essential hypertension                   Rationale for medication changes:      Hydroxyzine PRN for anxiety - was very effective for patient; PCP to further evaluate and re-assess.         Consults     NEPHROLOGY IP CONSULT  CARE MANAGEMENT / SOCIAL WORK IP CONSULT    Immunizations given this encounter     Most Recent Immunizations   Administered Date(s) Administered     COVID-19,PF,Pfizer (12+ Yrs) 09/29/2021     COVID-19,PF,Pfizer 12+ Yrs (2022 and After) 04/11/2022     FLU 6-35 months 10/17/2012     Influenza (High Dose) 3 valent vaccine 10/23/2018     Influenza (IIV3) PF 09/25/2014     Influenza Quad, Recombinant, pf(RIV4) (Flublok) 10/08/2019     Influenza, Quad, High Dose, Pf, 65yr+ (Fluzone HD) 12/07/2020     Pneumo Conj 13-V (2010&after) 06/22/2016     Pneumococcal 23 valent 10/08/2002     TDAP Vaccine (Adacel) 06/22/2016           Anticoagulation Information      Recent INR results: No results for input(s): INR in the last 168 hours.  Warfarin doses (if applicable) or name of other anticoagulant: NA      SIGNIFICANT IMAGING FINDINGS     No results found for this visit on 07/23/22.    SIGNIFICANT LABORATORY FINDINGS     Most Recent 3 CBC's:Recent Labs   Lab Test 07/23/22  0506 11/04/21  0956 09/30/20  1050   WBC 6.9 8.4 6.7   HGB 13.5 12.7 13.5    MCV 91 99 98    225 263     Most Recent 3 BMP's:Recent Labs   Lab Test 07/25/22  1348 07/25/22  0600 07/24/22  2159 07/23/22  1108 07/23/22  0506 11/04/21  0956 05/14/21  0901   * 130* 128*   < > 120* 138 142   POTASSIUM  --   --   --   --  3.9 4.2 4.2   CHLORIDE  --   --   --   --  86* 102 104   CO2  --   --   --   --  21* 26 27   BUN  --   --   --   --  5* 11 10   CR  --   --   --   --  0.58* 0.78 0.71   ANIONGAP  --   --   --   --  13 10 11   FANNIE  --   --   --   --  9.4 10.1 9.6   GLC  --   --   --   --  142* 110 117    < > = values in this interval not displayed.     Most Recent 2 LFT's:Recent Labs   Lab Test 07/23/22  0506 11/04/21  0956   AST 20 21   ALT 23 16   ALKPHOS 70 63   BILITOTAL 0.5 0.7     Most Recent 3 INR's:No lab results found.    Discharge Orders        Reason for your hospital stay    Hyponatremia and evaluations by hospital medicine and nephrology specialist team.     Hyponatremia was deemed to be due to poor solutes intake with excessive free water intake. Treatment included strict fluid restriction and frequent sodium level monitoring. Nephrology is okay with discharge today with sodium at 130. Continue the 1200 mL fluid restriction diet and follow-up with your own PCP and the BMP lab (basic metabolic panel) with a sodium level should be rechecked within 5-7 days.     Activity    Your activity upon discharge: activity as tolerated and no driving for today     Follow-up and recommended labs and tests    Follow up with primary care provider, Raul Lewis, within 7 days for hospital follow- up.    The following labs/tests are recommended: BMP (basic metabolic panel) including the sodium level (Na).    Continue the 1200 mL fluid restriction diet and follow-up with your own PCP and the BMP lab (basic metabolic panel) with a sodium level should be rechecked within 5-7 days.     Diet    Follow this diet upon discharge: Orders Placed This Encounter      1200 mL Fluid Restriction until  follow-up with PCP or nephrology and repeat BMP results      Regular Diet Adult       Examination   Physical Exam   Temp:  [97.9  F (36.6  C)-98.8  F (37.1  C)] 97.9  F (36.6  C)  Pulse:  [61-69] 69  Resp:  [18] 18  BP: (128-143)/(66-70) 143/66  SpO2:  [98 %-100 %] 100 %  Wt Readings from Last 1 Encounters:   07/25/22 47.1 kg (103 lb 12.8 oz)       GENERAL:  Alert, appears comfortable, in no acute distress, appears stated age   HEAD:  Normocephalic, without obvious abnormality, atraumatic   EYES:  PERRL, conjunctiva/corneas clear, no scleral icterus, EOM's intact   NOSE: Nares normal, septum midline, mucosa normal, no drainage   THROAT: Lips, mucosa, and tongue normal; teeth and gums normal, mouth moist   NECK: Supple, symmetrical, trachea midline   BACK:   Symmetric, no curvature, ROM normal   LUNGS:   Clear to auscultation bilaterally, no rales, rhonchi, or wheezing, symmetric chest rise on inhalation, respirations unlabored   CHEST WALL:  No tenderness or deformity   HEART:  Regular rate and rhythm, S1 and S2 normal, no murmur, rub, or gallop    ABDOMEN:   Soft, non-tender, bowel sounds active all four quadrants, no masses, no organomegaly, no rebound or guarding   EXTREMITIES: Extremities normal, atraumatic, no cyanosis or edema    SKIN: Dry to touch, no exanthems in the visualized areas   NEURO: Alert, oriented x 4, moves all four extremities freely/spontaneously   PSYCH: Cooperative, behavior is appropriate      Please see EMR for more detailed significant labs, imaging, consultant notes etc.    I, Brady Escamilla MD, personally saw the patient today and spent greater than 30 minutes discharging this patient.    Brady Escamilla MD  Internal Medicine  Hospitalist  [facility]  Phone: #[phone]    CC:Raul Lewis    Addendum:  Underweight as evidenced by poor appetite and BMI of 18.99kg/m

## 2022-07-25 NOTE — PROGRESS NOTES
RENAL PROGRESS NOTE    CC:  Weakness, nausea    ASSESSMENT & PLAN:   87 year old female with PMH of HTN, HLD , and recent COVID treated with Paxlovid , presented with acute hyponatremia with nausea , poor appetite and weakness with foggy brain feeling. Nephrology consulted for acute hyponatremia     Acute hyponatremia  Baseline sodium is 134-138 mostly  Has had a sodium down to 128 in 10/2020 with some NSAIDS and severe back pain/narcs  Mild symptoms   Sodium 120 upon admit, acute hyponatremia likely secondary to poor solute intake, and increased free water intake.  Less likely SIADH based on osmolarity studies.  UA with specific gravity 1.006 which was very dilute. S/p 1 L normal saline in the ED, subsequently increased solute intake this admit was encouraged and placed on fluid restriction 1200 ml  Sodium seems to be uptrending, 130 today.  Did have a good breakfast this morning.  Repeat serum sodium scheduled for 2 PM today, if serum sodium >130 would be okay to discharge from a renal perspective.  Would recommend repeat metabolic panel at PCP office this Wednesday to ensure further improvement of hyponatremia.  No need for nephrology follow-up outpatient.       Mild hypochloremia   Mild low bicarb  -- monitor for now  -- will be suggestive of a more low solute state     Hypomagnesemia  Given IV 1gm mag      Normal renal function     HTN , clinically euvolemic  BP controlled.   On losartan, atenolol and Norvasc at home->ordered here with hold parameters       HLD  On statin     Recent COVID treated with Paxlovid  Common s/e of it include nausea and dysgeusia along with HTN  Unclear if COVID and the med triggered the nausea symptoms  Seems to have lost her taste after COVID infection, and partially contributing to her poor solid food intake  Management per primary medicine team       SUBJECTIVE:    Says she is feeling quite a bit better today  Did have a good breakfast.  Had some pancakes, and welch.  Then  had a salad later today.  Much less nausea, no vomiting  No shortness of breath  No peripheral edema  No neurologic symptoms  Feels she is ready for discharge  Daughter on the phone during encounter, and all questions answered  Discussed with RN      OBJECTIVE:  Physical Exam   Temp: 97.9  F (36.6  C) Temp src: Oral BP: (!) 143/66 Pulse: 69   Resp: 18 SpO2: 100 % O2 Device: None (Room air)    Vitals:    07/23/22 1710 07/24/22 0500 07/25/22 0500   Weight: 44.5 kg (98 lb 1.6 oz) 47 kg (103 lb 11.2 oz) 47.1 kg (103 lb 12.8 oz)     Vital Signs with Ranges  Temp:  [97.9  F (36.6  C)-98.8  F (37.1  C)] 97.9  F (36.6  C)  Pulse:  [61-69] 69  Resp:  [18] 18  BP: (128-143)/(66-70) 143/66  SpO2:  [98 %-100 %] 100 %  I/O last 3 completed shifts:  In: 410 [P.O.:410]  Out: 1250 [Urine:1250]        Patient Vitals for the past 72 hrs:   Weight   07/25/22 0500 47.1 kg (103 lb 12.8 oz)   07/24/22 0500 47 kg (103 lb 11.2 oz)   07/23/22 1710 44.5 kg (98 lb 1.6 oz)   07/23/22 0329 49.9 kg (110 lb)     PHYSICAL EXAM:  General - Alert and oriented x3, appears comfortable, NAD  Cardiovascular - RRR  Respiratory - clear, normal effort, room air.  Abd: +bs, soft  Extremities - no edema  Skin: warm, dry  Neuro:  Grossly intakct  MSK:  Grossly intact  Psych:  calm, appropriate affect     LABORATORY STUDIES:     Recent Labs   Lab 07/23/22  0506   WBC 6.9   RBC 4.25   HGB 13.5   HCT 38.5          Basic Metabolic Panel:  Recent Labs   Lab 07/25/22  0600 07/24/22  2159 07/24/22  1400 07/24/22  0945 07/24/22  0631 07/24/22  0157 07/23/22  1108 07/23/22  0506   * 128* 128* 126* 126* 127*   < > 120*   POTASSIUM  --   --   --   --   --   --   --  3.9   CHLORIDE  --   --   --   --   --   --   --  86*   CO2  --   --   --   --   --   --   --  21*   BUN  --   --   --   --   --   --   --  5*   CR  --   --   --   --   --   --   --  0.58*   GLC  --   --   --   --   --   --   --  142*   FANNIE  --   --   --   --   --   --   --  9.4    < > = values  in this interval not displayed.       INRNo lab results found in last 7 days.     Recent Labs   Lab Test 07/23/22  0506 11/04/21  0956   WBC 6.9 8.4   HGB 13.5 12.7    225       Personally reviewed current labs    Marija Leslie PA-C  [facility]  [phone]

## 2022-07-25 NOTE — PLAN OF CARE
Problem: Electrolyte Imbalance  Goal: Electrolyte Balance  Outcome: Ongoing, Progressing     Vital signs stable overnight. Sodium levels monitored every 8 hours, this morning Na level 130.  Patient has been sipping slowly on her Gatorade, has been complaint with her free water fluid restriction.  Patient has denied nausea. She reports a decreased appetite, attributes to her current lack of taste and smell. Patient voiding WNL, urine clear yellow. Patient's strength now WNL, she tolerated ambulating the hallway without difficulty. Patient now up independently.

## 2022-07-26 ENCOUNTER — PATIENT OUTREACH (OUTPATIENT)
Dept: INTERNAL MEDICINE | Facility: CLINIC | Age: 87
End: 2022-07-26

## 2022-07-26 NOTE — TELEPHONE ENCOUNTER
"ED/Discharge Protocol    \"Hi, my name is Mini Chavez RN, a registered nurse, and I am calling on behalf of Dr. Lewis's office at [facility].  I am calling to follow up and see how things are going for you after your recent visit.\"    \"I see that you were in the (ER/UC/IP) on 7/23/22.    How are you doing now that you are home?\"     \"Much better but not 100%. I still dont have my taste from covid but I know I need to keep eating to help my sodium. My daughter is staying with me right now to help.\"    Nurse inquired about new PRN hydroxyzine for anxiety.     \"I took it one time and it did help me. I took it before bed to help me sleep. I want to try to get as much rest as I can\".      Is patient experiencing symptoms that may require a hospital visit?  No    Discharge Instructions    \"Let's review your discharge instructions.  What is/are the follow-up recommendations?  Pt. Response: Follow up labs and appointment with primary provider    \"Were you instructed to make a follow-up appointment?\"  Pt. Response: Yes.  Has appointment been made?   Yes      \"When you see the provider, I would recommend that you bring your discharge instructions with you.    Medications    \"How many new medications are you on since your hospitalization/ED visit?\"    0-1  \"How many of your current medicines changed (dose, timing, name, etc.) while you were in the hospital/ED visit?\"   0-1  \"Do you have questions about your medications?\"   No  \"Were you newly diagnosed with heart failure, COPD, diabetes or did you have a heart attack?\"   No  For patients on insulin: \"Did you start on insulin in the hospital or did you have your insulin dose changed?\"   No  Post Discharge Medication Reconciliation Status: discharge medications reconciled, continue medications without change.    Was MTM referral placed (*Make sure to put transitions as reason for referral)?   No    Call Summary    \"Do you have any questions or concerns about your condition or " "care plan at the moment?\"    No  Triage nurse advice given: N/A    Patient was in ER 1 time in the past year (assess appropriateness of ER visits.)      \"If you have questions or things don't continue to improve, we encourage you contact us through the main clinic number,  [phone].  Even if the clinic is not open, triage nurses are available 24/7 to help you.     We would like you to know that our clinic has extended hours (provide information).  We also have urgent care (provide details on closest location and hours/contact info)\"      \"Thank you for your time and take care!\"    Mini Chavez RN          "

## 2022-07-27 ENCOUNTER — LAB (OUTPATIENT)
Dept: LAB | Facility: CLINIC | Age: 87
End: 2022-07-27
Payer: MEDICARE

## 2022-07-27 ENCOUNTER — TELEPHONE (OUTPATIENT)
Dept: INTERNAL MEDICINE | Facility: CLINIC | Age: 87
End: 2022-07-27

## 2022-07-27 DIAGNOSIS — E87.1 HYPONATREMIA: ICD-10-CM

## 2022-07-27 DIAGNOSIS — E87.1 HYPONATREMIA: Primary | ICD-10-CM

## 2022-07-27 LAB
ANION GAP SERPL CALCULATED.3IONS-SCNC: 9 MMOL/L (ref 7–15)
BUN SERPL-MCNC: 20.1 MG/DL (ref 8–23)
CALCIUM SERPL-MCNC: 9.8 MG/DL (ref 8.8–10.2)
CHLORIDE SERPL-SCNC: 96 MMOL/L (ref 98–107)
CREAT SERPL-MCNC: 0.58 MG/DL (ref 0.51–0.95)
DEPRECATED HCO3 PLAS-SCNC: 28 MMOL/L (ref 22–29)
GFR SERPL CREATININE-BSD FRML MDRD: 88 ML/MIN/1.73M2
GLUCOSE SERPL-MCNC: 99 MG/DL (ref 70–99)
POTASSIUM SERPL-SCNC: 4.5 MMOL/L (ref 3.4–5.3)
SODIUM SERPL-SCNC: 133 MMOL/L (ref 136–145)

## 2022-07-27 PROCEDURE — 80048 BASIC METABOLIC PNL TOTAL CA: CPT

## 2022-07-27 PROCEDURE — 36415 COLL VENOUS BLD VENIPUNCTURE: CPT

## 2022-07-27 NOTE — TELEPHONE ENCOUNTER
Patient was recently in the hospital for hyponatremia.  Patient arrived wanting a clinic draw today.  Patient has a hospital follow-up appointment in 2 days.  Patient can check basic metabolic panel today for follow-up on her hyponatremia.

## 2022-07-29 ENCOUNTER — OFFICE VISIT (OUTPATIENT)
Dept: INTERNAL MEDICINE | Facility: CLINIC | Age: 87
End: 2022-07-29
Payer: MEDICARE

## 2022-07-29 VITALS
DIASTOLIC BLOOD PRESSURE: 60 MMHG | BODY MASS INDEX: 19.68 KG/M2 | SYSTOLIC BLOOD PRESSURE: 140 MMHG | OXYGEN SATURATION: 96 % | WEIGHT: 107.6 LBS | HEART RATE: 41 BPM

## 2022-07-29 DIAGNOSIS — I10 ESSENTIAL HYPERTENSION: Primary | ICD-10-CM

## 2022-07-29 DIAGNOSIS — E87.1 HYPONATREMIA: ICD-10-CM

## 2022-07-29 DIAGNOSIS — F41.9 ANXIETY: ICD-10-CM

## 2022-07-29 DIAGNOSIS — Z86.16 HISTORY OF 2019 NOVEL CORONAVIRUS DISEASE (COVID-19): ICD-10-CM

## 2022-07-29 DIAGNOSIS — R00.1 BRADYCARDIA: ICD-10-CM

## 2022-07-29 PROCEDURE — 99496 TRANSJ CARE MGMT HIGH F2F 7D: CPT | Performed by: INTERNAL MEDICINE

## 2022-07-29 RX ORDER — LOSARTAN POTASSIUM 100 MG/1
TABLET ORAL
Qty: 90 TABLET | Refills: 3 | Status: SHIPPED | OUTPATIENT
Start: 2022-07-29 | End: 2023-04-18

## 2022-07-29 NOTE — PATIENT INSTRUCTIONS
Continue on current medications.  If your sodium level continues to be low, we may need to discuss a lower dose of losartan in the future.    Gradually return to a normal diet for you.  You should be back on a normal diet within 2 weeks.    Seen in clinic in 2 weeks to reevaluate blood pressure and sodium at that time.    You can gradually drink more water if needed, but avoid excessive water drinking, or drinking water without food.    You can use the Vistaril as needed for anxiety.  I would avoid the Benadryl at night, as that can cause you to be more likely to fall.

## 2022-07-29 NOTE — PROGRESS NOTES
Martha Chamorro   87 year old female    Date of Visit: 7/29/2022    Chief Complaint   Patient presents with     Hospital F/U     7/23/22-7/25/22   COVID, Low Sodium     Subjective  Hospital follow-up visit for an 87-year-old female with chronic anxiety, thin with history of hypertension.    Patient developed moderate severity COVID in mid July and was treated with antiviral.  She lost her taste and smell.    Patient was drinking significant amounts of fluid but not eating regular food.    She became weak and unsteady and had a sodium of 120 and was admitted to the hospital.  Free water restriction and given IV fluids.  Evaluation by nephrology in the hospital per the discharge summary stated the evaluation did not show SIADH.  Suspected volume depletion with free water repletion.    She has normal kidney function.    She does use losartan 100 mg a day in addition to amlodipine 5 mg and atenolol 25 mg a day for hypertension.    Blood pressure is usually been well controlled, tendency toward low diastolic but denies lightheaded dizzy spells.  Blood pressure has been fluctuating in the past with fluctuating systolic blood pressures.    She does have chronic anxiety, not on SSRI.  She has been using Vistaril as needed for anxiety.    She is starting to eat better now.  Had lasagna last night.  She is limiting her fluid intake, trying to limit to 1200 cc or less a day.  She does complain of a mild dry mouth.    She states her blood pressure at home has been variable from 120s to 170s systolic but diastolics generally in the 60s.    I did review labs from July 22 with a creatinine of 0.58.  Potassium 4.5 and sodium 133, up from 130 on July 25.    Patient has a history of intermittent tachycardia with associated anxiety.  She is on atenolol.  He has had borderline low resting heart rates in the past in the 50s and 60s but does tolerate them.  No history of syncope or syncope.    No residual shortness of breath or  cough.  He has not had chest pain.  No edema.  No abdominal pain complaints.    Her daughter, Alfredo, was present on phone call today.  Patient takes care of her  who has medical issues    PMHx:  No past medical history on file.  PSHx:    Past Surgical History:   Procedure Laterality Date     HYSTERECTOMY  [id number]     OOPHORECTOMY  [id number]     Immunizations:   Immunization History   Administered Date(s) Administered     COVID-19,PF,Pfizer (12+ Yrs) 02/10/2021, 03/03/2021, 09/29/2021     COVID-19,PF,Pfizer 12+ Yrs (2022 and After) 04/11/2022     FLU 6-35 months 10/17/2012     Influenza (High Dose) 3 valent vaccine 09/15/2015, 11/10/2016, 10/10/2017, 10/23/2018     Influenza (IIV3) PF 09/23/2009, 11/08/2013, 09/25/2014     Influenza Quad, Recombinant, pf(RIV4) (Flublok) 10/08/2019     Influenza, Quad, High Dose, Pf, 65yr+ (Fluzone HD) 12/07/2020     Pneumo Conj 13-V (2010&after) 06/22/2016     Pneumococcal 23 valent 10/08/2002     TDAP Vaccine (Adacel) 06/22/2016       ROS A comprehensive review of systems was performed and was otherwise negative    Medications, allergies, and problem list were reviewed and updated    Exam  BP (!) 144/56 (BP Location: Left arm, Patient Position: Sitting, Cuff Size: Adult Regular)   Pulse (!) 41   Wt 48.8 kg (107 lb 9.6 oz)   SpO2 96%   BMI 19.68 kg/m    Frail thin patient appears mildly anxious.  No JVD.  No jaundice.  Lungs are clear to auscultation without crackles, good respiratory excursion.  Heart is regular without murmur.  Abdomen is thin nontender.  No edema.  Heart rate was 72 on my recheck.  Regular rhythm without premature beats.    Assessment/Plan  1. Essential hypertension  Adequately controlled at this time.  Borderline low diastolic.    History of fluctuating systolic blood pressures likely associated with anxiety or stress.    If sodium persistent low, could consider lower dose of losartan.    Avoid diuretic with history of low sodium.  - losartan (COZAAR)  100 MG tablet; Take 1 tablet (100 mg total) by mouth daily.  Dispense: 90 tablet; Refill: 3    Reevaluate in 2 weeks after she returns to a normal diet.        2. Hyponatremia  Associated with free water repletion with volume depletion associated with her COVID illness earlier this month.    She can gradually return to a normal diet and reduce her fluid restriction, but she was warned to avoid excessive drinking especially without food.  Recheck in 2 weeks.    3. Bradycardia  Baseline bradycardia but tolerates.  Heart rate was in the 70s on my exam and regular.  Continue current atenolol 25 mg a day.    I did discuss option of changing her atenolol to Toprol-XL 25 mg a day.  We can discuss at next visit.    4. History of 2019 novel coronavirus disease (COVID-19)  Has fully recovered after illness earlier this month    5. Anxiety  Chronic issue.  I counseled patient against using Benadryl at night with risk of increasing fall risk.  She does wish to use the Vistaril as needed at this time also warned on risk of that medication with similar side effects.    Avoid SSRI with a history of hyponatremia but could consider if needed.  Reevaluate in 2 weeks    Hypercholesterolemia on Lipitor, does not have a diagnosis of vascular disease and does have a high HDL.  She could consider discontinuation of atenolol in the future.      Return in about 2 weeks (around 8/12/2022) for Follow up.   Patient Instructions   Continue on current medications.  If your sodium level continues to be low, we may need to discuss a lower dose of losartan in the future.    Gradually return to a normal diet for you.  You should be back on a normal diet within 2 weeks.    Seen in clinic in 2 weeks to reevaluate blood pressure and sodium at that time.    You can gradually drink more water if needed, but avoid excessive water drinking, or drinking water without food.    You can use the Vistaril as needed for anxiety.  I would avoid the Benadryl at  night, as that can cause you to be more likely to fall.        Raul Lewis MD, MD        Current Outpatient Medications   Medication Sig Dispense Refill     acetaminophen (TYLENOL) 500 MG tablet Take 500-1,000 mg by mouth every 6 hours as needed for mild pain       amLODIPine (NORVASC) 5 MG tablet Take 1 tablet (5 mg) by mouth daily 90 tablet 3     atenolol (TENORMIN) 25 MG tablet Take 1 tablet (25 mg total) by mouth daily. 90 tablet 3     atorvastatin (LIPITOR) 10 MG tablet Take 1 tablet (10 mg) by mouth At Bedtime 90 tablet 3     calcium carbonate (OS-FANNIE) 1500 (600 Ca) MG tablet Take 600 mg by mouth daily       calcium carbonate (TUMS) 500 MG chewable tablet Take 1 chew tab by mouth daily       cholecalciferol 50 MCG (2000 UT) CAPS Take 1 capsule by mouth daily        hydrOXYzine (ATARAX) 10 MG tablet Take 1 tablet (10 mg) by mouth every 4 hours as needed for anxiety 30 tablet 0     losartan (COZAAR) 100 MG tablet Take 1 tablet (100 mg total) by mouth daily. 90 tablet 3     Allergies   Allergen Reactions     Fosinopril Cough     Social History     Tobacco Use     Smoking status: Former Smoker     Packs/day: 0.00     Smokeless tobacco: Never Used   Substance Use Topics     Alcohol use: Yes     Comment: Alcoholic Drinks/day: occasional             Subjective   Martha is a 87 year old, presenting for the following health issues:  Hospital F/U (7/23/22-7/25/22 /COVID, Low Sodium)      [facility]       Hospital Follow-up Visit:    Hospital/Nursing Home/IP Rehab Facility: [facility]  Date of Admission: 7/23/22  Date of Discharge: 7/25/22  Reason(s) for Admission: COVID - Hyponatremia    Was your hospitalization related to COVID-19? No   Problems taking medications regularly:  None  Medication changes since discharge: None  Problems adhering to non-medication therapy:  None    Summary of hospitalization:  [facility] discharge summary reviewed  Diagnostic Tests/Treatments reviewed.   Follow up needed: 2-week follow-up in clinic with me  Other Healthcare Providers Involved in Patient s Care:         None  Update since discharge: improved.       Post Medication Reconciliation Status:        Plan of care communicated with patient               Review of Systems   Constitutional, HEENT, cardiovascular, pulmonary, GI, , musculoskeletal, neuro, skin, endocrine and psych systems are negative, except as otherwise noted.      Objective    There were no vitals taken for this visit.  There is no height or weight on file to calculate BMI.  Physical Exam   See above                .  ..

## 2022-08-12 ENCOUNTER — OFFICE VISIT (OUTPATIENT)
Dept: INTERNAL MEDICINE | Facility: CLINIC | Age: 87
End: 2022-08-12
Payer: MEDICARE

## 2022-08-12 VITALS
OXYGEN SATURATION: 99 % | SYSTOLIC BLOOD PRESSURE: 122 MMHG | WEIGHT: 106.9 LBS | HEART RATE: 64 BPM | BODY MASS INDEX: 19.55 KG/M2 | DIASTOLIC BLOOD PRESSURE: 70 MMHG

## 2022-08-12 DIAGNOSIS — R42 DIZZINESS: ICD-10-CM

## 2022-08-12 DIAGNOSIS — F41.9 ANXIETY: ICD-10-CM

## 2022-08-12 DIAGNOSIS — E87.1 HYPONATREMIA: ICD-10-CM

## 2022-08-12 DIAGNOSIS — E78.00 HYPERCHOLESTEROLEMIA: ICD-10-CM

## 2022-08-12 DIAGNOSIS — I10 ESSENTIAL HYPERTENSION: Primary | ICD-10-CM

## 2022-08-12 DIAGNOSIS — G47.00 INSOMNIA, UNSPECIFIED TYPE: ICD-10-CM

## 2022-08-12 LAB
ANION GAP SERPL CALCULATED.3IONS-SCNC: 10 MMOL/L (ref 7–15)
BUN SERPL-MCNC: 11.6 MG/DL (ref 8–23)
CALCIUM SERPL-MCNC: 10 MG/DL (ref 8.8–10.2)
CHLORIDE SERPL-SCNC: 99 MMOL/L (ref 98–107)
CREAT SERPL-MCNC: 0.63 MG/DL (ref 0.51–0.95)
DEPRECATED HCO3 PLAS-SCNC: 27 MMOL/L (ref 22–29)
GFR SERPL CREATININE-BSD FRML MDRD: 86 ML/MIN/1.73M2
GLUCOSE SERPL-MCNC: 95 MG/DL (ref 70–99)
POTASSIUM SERPL-SCNC: 4.5 MMOL/L (ref 3.4–5.3)
SODIUM SERPL-SCNC: 136 MMOL/L (ref 136–145)

## 2022-08-12 PROCEDURE — 99214 OFFICE O/P EST MOD 30 MIN: CPT | Performed by: INTERNAL MEDICINE

## 2022-08-12 PROCEDURE — 36415 COLL VENOUS BLD VENIPUNCTURE: CPT | Performed by: INTERNAL MEDICINE

## 2022-08-12 PROCEDURE — 80048 BASIC METABOLIC PNL TOTAL CA: CPT | Performed by: INTERNAL MEDICINE

## 2022-08-12 RX ORDER — HYDROXYZINE HYDROCHLORIDE 10 MG/1
10 TABLET, FILM COATED ORAL
Qty: 30 TABLET | Refills: 0 | Status: SHIPPED | OUTPATIENT
Start: 2022-08-12 | End: 2022-10-18

## 2022-08-12 RX ORDER — LANOLIN ALCOHOL/MO/W.PET/CERES
3 CREAM (GRAM) TOPICAL
COMMUNITY
Start: 2022-08-12 | End: 2023-04-18

## 2022-08-12 ASSESSMENT — ANXIETY QUESTIONNAIRES
4. TROUBLE RELAXING: SEVERAL DAYS
5. BEING SO RESTLESS THAT IT IS HARD TO SIT STILL: NOT AT ALL
1. FEELING NERVOUS, ANXIOUS, OR ON EDGE: NEARLY EVERY DAY
2. NOT BEING ABLE TO STOP OR CONTROL WORRYING: SEVERAL DAYS
7. FEELING AFRAID AS IF SOMETHING AWFUL MIGHT HAPPEN: SEVERAL DAYS
IF YOU CHECKED OFF ANY PROBLEMS ON THIS QUESTIONNAIRE, HOW DIFFICULT HAVE THESE PROBLEMS MADE IT FOR YOU TO DO YOUR WORK, TAKE CARE OF THINGS AT HOME, OR GET ALONG WITH OTHER PEOPLE: NOT DIFFICULT AT ALL
6. BECOMING EASILY ANNOYED OR IRRITABLE: NOT AT ALL
GAD7 TOTAL SCORE: 7
3. WORRYING TOO MUCH ABOUT DIFFERENT THINGS: SEVERAL DAYS
7. FEELING AFRAID AS IF SOMETHING AWFUL MIGHT HAPPEN: SEVERAL DAYS
8. IF YOU CHECKED OFF ANY PROBLEMS, HOW DIFFICULT HAVE THESE MADE IT FOR YOU TO DO YOUR WORK, TAKE CARE OF THINGS AT HOME, OR GET ALONG WITH OTHER PEOPLE?: NOT DIFFICULT AT ALL
GAD7 TOTAL SCORE: 7
GAD7 TOTAL SCORE: 7

## 2022-08-12 NOTE — PATIENT INSTRUCTIONS
Continue on current medications.    If you have increasing palpitations, or heart rates less than 45 more often, contact me and I would have you undergo a heart monitor test.    Continue with a normal diet.  Avoid salty foods and drinks with salt in them, however.  Avoid excessive water or coffee drinking.    Follow-up in 2 months for physical exam with lab work.    You can use melatonin 3 mg in the evening, in addition to the hydroxyzine if needed for sleep.  Avoid taking hydroxyzine during the day, as that can cause unsteadiness and increase fall risk.    If you wish to discuss further medication for anxiety, that can be done at future visit.    Schedule a carotid ultrasound to evaluate for any carotid artery plaque or stenosis.  If the carotid ultrasound is okay, you may wish to consider discontinuing the atorvastatin in the future.  We can discuss that at your physical.

## 2022-08-12 NOTE — PROGRESS NOTES
Martha STARR Pedro Pablo   87 year old female    Date of Visit: 8/12/2022    Chief Complaint   Patient presents with     Follow Up     Hypertension, hyponatremia      Subjective  87-year-old female lives independently with .  She has a long history of chronic anxiety But not on medication.    Patient had COVID illness, poor oral intake, developed severe hyponatremia and was hospitalized with a sodium of 120.  Sodium level was improving on July 27 with a sodium of 133.  Creatinine 0.58.    She has been back to a normal diet.  She is been trying to eat more salty foods, however.  She is drinking some Gatorade light.  She is back to drinking decaf coffee regularly but not to the level she was drinking in the past.    She does continue to have chronic anxiety, some intermittent insomnia.  She has been taking the hydroxyzine intermittently, sometimes up to 3 times a day which helps some.  She has been sleeping better last 3 nights but she has not tried melatonin.    She did not want to discuss SSRI or antianxiety medication at this time.  No history of alcohol.    She is starting to feel better as she is recovered from COVID.  She is getting some of her taste and smell back.    She has had some lightheaded dizziness and general weakness in the past, but all that is improving as she is recovering.    He has not had a history of vascular evaluation.  She still on Lipitor 10 mg.  Excellent cholesterol last November.  No family history of coronary disease, quit smoking at age 36.    Continues on losartan 100 mg a day, amlodipine 5 mg a day and atenolol 25 mg a day.  She is had some intermittent palpitations consistent with premature beats.  No sustained arrhythmia.  No syncope.  He has a tendency toward bradycardia but her heart rate has been variable tween 47-90.  Most numbers appear to be in the 60s for heart rate on her home checks.    Her home blood pressure has been 139/64-1 58/68    No edema    PMHx:  No past medical  history on file.  PSHx:    Past Surgical History:   Procedure Laterality Date     HYSTERECTOMY  [id number]     OOPHORECTOMY  [id number]     Immunizations:   Immunization History   Administered Date(s) Administered     COVID-19,PF,Pfizer (12+ Yrs) 02/10/2021, 03/03/2021, 09/29/2021     COVID-19,PF,Pfizer 12+ Yrs (2022 and After) 04/11/2022     FLU 6-35 months 10/17/2012     Influenza (High Dose) 3 valent vaccine 09/15/2015, 11/10/2016, 10/10/2017, 10/23/2018     Influenza (IIV3) PF 09/23/2009, 11/08/2013, 09/25/2014     Influenza Quad, Recombinant, pf(RIV4) (Flublok) 10/08/2019     Influenza, Quad, High Dose, Pf, 65yr+ (Fluzone HD) 12/07/2020     Pneumo Conj 13-V (2010&after) 06/22/2016     Pneumococcal 23 valent 10/08/2002     TDAP Vaccine (Adacel) 06/22/2016       ROS A comprehensive review of systems was performed and was otherwise negative    Medications, allergies, and problem list were reviewed and updated    Exam  /70   Pulse 64   Wt 48.5 kg (106 lb 14.4 oz)   SpO2 99%   BMI 19.55 kg/m    She does appear moderately anxious.  Thin woman.  Otherwise appropriate mentation.  Able to climb up on exam table.  Lungs are clear to auscultation, no crackles or wheezing.  Heart is regular with for premature beats heard during extended auscultation.  The remainder of the heartbeats are normal.  No murmur rub or gallop.  No ankle edema.  Abdomen is nontender.    Assessment/Plan  1. Essential hypertension  Controlled on recheck.  Some fluctuating blood pressures, likely affected by anxiety.    Tendency toward low heart rate but variable.  We discussed option to change atenolol over to Toprol-XL 25 mg a day, which could be considered in the future.    Continue current losartan 100 mg a day as long as sodium level is not worse.    Continue amlodipine 5 mg a day, she could consider a higher dose if worsening hypertension.    Follow-up for reevaluation in 2 months at her physical    2. Hyponatremia  Associate with  recent COVID illness and her chronic anxiety.  Avoid excessive water or coffee drinking.  Eat a regular diet.  She was still also told to avoid higher salt foods as her blood pressure is intermittently high.    Recheck in 2 weeks  - Basic metabolic panel    3. Anxiety  Chronic.  We could discuss SSRI in the future.  I asked her to limit the hydroxyzine just to at night.  I did discuss side effects of hydroxyzine including fall risk and confusion risk.  Could consider low-dose melatonin  - hydrOXYzine (ATARAX) 10 MG tablet; Take 1 tablet (10 mg) by mouth nightly as needed for anxiety  Dispense: 30 tablet; Refill: 0    4. Hypercholesterolemia  On Lipitor 10 mg.  Unclear if she has evidence of vascular disease as she is not had previous evaluation.  He does not have any sign of toxicity at this time with but with age 87, may consider discontinuation of statin.  If there is not evidence of significant vascular disease on her carotid ultrasound, could consider discontinuation of Lipitor at physical this fall    5. Insomnia, unspecified type  As above.  - melatonin 3 MG tablet; Take 1 tablet (3 mg) by mouth nightly as needed for sleep    6. Dizziness  Improved, since recovering from COVID and improved sodium  - US Carotid Bilateral; Future      Return in about 2 months (around 10/12/2022) for Routine preventive.   Patient Instructions   Continue on current medications.    If you have increasing palpitations, or heart rates less than 45 more often, contact me and I would have you undergo a heart monitor test.    Continue with a normal diet.  Avoid salty foods and drinks with salt in them, however.  Avoid excessive water or coffee drinking.    Follow-up in 2 months for physical exam with lab work.    You can use melatonin 3 mg in the evening, in addition to the hydroxyzine if needed for sleep.  Avoid taking hydroxyzine during the day, as that can cause unsteadiness and increase fall risk.    If you wish to discuss further  medication for anxiety, that can be done at future visit.    Schedule a carotid ultrasound to evaluate for any carotid artery plaque or stenosis.  If the carotid ultrasound is okay, you may wish to consider discontinuing the atorvastatin in the future.  We can discuss that at your physical.    Raul Lewis MD, MD        Current Outpatient Medications   Medication Sig Dispense Refill     acetaminophen (TYLENOL) 500 MG tablet Take 500-1,000 mg by mouth every 6 hours as needed for mild pain       amLODIPine (NORVASC) 5 MG tablet Take 1 tablet (5 mg) by mouth daily 90 tablet 3     atenolol (TENORMIN) 25 MG tablet Take 1 tablet (25 mg total) by mouth daily. 90 tablet 3     atorvastatin (LIPITOR) 10 MG tablet Take 1 tablet (10 mg) by mouth At Bedtime 90 tablet 3     calcium carbonate (OS-FANNIE) 1500 (600 Ca) MG tablet Take 600 mg by mouth daily       calcium carbonate (TUMS) 500 MG chewable tablet Take 1 chew tab by mouth daily       cholecalciferol 50 MCG (2000 UT) CAPS Take 1 capsule by mouth daily        hydrOXYzine (ATARAX) 10 MG tablet Take 1 tablet (10 mg) by mouth nightly as needed for anxiety 30 tablet 0     losartan (COZAAR) 100 MG tablet Take 1 tablet (100 mg total) by mouth daily. 90 tablet 3     melatonin 3 MG tablet Take 1 tablet (3 mg) by mouth nightly as needed for sleep       Allergies   Allergen Reactions     Fosinopril Cough     Social History     Tobacco Use     Smoking status: Former Smoker     Packs/day: 0.00     Smokeless tobacco: Never Used   Substance Use Topics     Alcohol use: Yes     Comment: Alcoholic Drinks/day: occasional             Subjective   Martha is a 87 year old, presenting for the following health issues:  Follow Up (Hypertension, hyponatremia )      History of Present Illness       Mental Health Follow-up:  Patient presents to follow-up on Anxiety.    Patient's anxiety since last visit has been:  Medium  The patient is having other symptoms associated with anxiety.  Any significant  life events: health concerns  Patient is feeling anxious or having panic attacks.  Patient has no concerns about alcohol or drug use.    Hypertension: She presents for follow up of hypertension.  She does check blood pressure  regularly outside of the clinic. Outside blood pressures have been over 140/90. She follows a low salt diet.     Vascular Disease:  She presents for follow up of vascular disease.  She never takes nitroglycerin. She is not taking daily aspirin.   Today's PURNIMA-7 Score: 7             Review of Systems         Objective    There were no vitals taken for this visit.  There is no height or weight on file to calculate BMI.  Physical Exam                       .  ..

## 2022-09-06 ENCOUNTER — TRANSFERRED RECORDS (OUTPATIENT)
Dept: HEALTH INFORMATION MANAGEMENT | Facility: CLINIC | Age: 87
End: 2022-09-06

## 2022-09-24 ENCOUNTER — HEALTH MAINTENANCE LETTER (OUTPATIENT)
Age: 87
End: 2022-09-24

## 2022-10-14 ENCOUNTER — HOSPITAL ENCOUNTER (OUTPATIENT)
Dept: ULTRASOUND IMAGING | Facility: CLINIC | Age: 87
Discharge: HOME | End: 2022-10-14
Attending: INTERNAL MEDICINE | Admitting: INTERNAL MEDICINE
Payer: MEDICARE

## 2022-10-14 DIAGNOSIS — R42 DIZZINESS: ICD-10-CM

## 2022-10-14 PROCEDURE — 93880 EXTRACRANIAL BILAT STUDY: CPT

## 2022-10-18 ENCOUNTER — OFFICE VISIT (OUTPATIENT)
Dept: INTERNAL MEDICINE | Facility: CLINIC | Age: 87
End: 2022-10-18
Payer: MEDICARE

## 2022-10-18 VITALS
SYSTOLIC BLOOD PRESSURE: 142 MMHG | OXYGEN SATURATION: 97 % | DIASTOLIC BLOOD PRESSURE: 68 MMHG | HEIGHT: 61 IN | BODY MASS INDEX: 20.39 KG/M2 | WEIGHT: 108 LBS | HEART RATE: 87 BPM

## 2022-10-18 DIAGNOSIS — Z23 NEED FOR IMMUNIZATION AGAINST INFLUENZA: ICD-10-CM

## 2022-10-18 DIAGNOSIS — I10 ESSENTIAL HYPERTENSION: ICD-10-CM

## 2022-10-18 DIAGNOSIS — I65.21 ASYMPTOMATIC CAROTID ARTERY STENOSIS, RIGHT: ICD-10-CM

## 2022-10-18 DIAGNOSIS — E78.00 HYPERCHOLESTEROLEMIA: ICD-10-CM

## 2022-10-18 DIAGNOSIS — Z00.00 ENCOUNTER FOR WELLNESS EXAMINATION IN ADULT: Primary | ICD-10-CM

## 2022-10-18 DIAGNOSIS — Z51.81 ENCOUNTER FOR THERAPEUTIC DRUG MONITORING: ICD-10-CM

## 2022-10-18 DIAGNOSIS — Z23 NEED FOR COVID-19 VACCINE: ICD-10-CM

## 2022-10-18 LAB
ALBUMIN SERPL BCG-MCNC: 4.5 G/DL (ref 3.5–5.2)
ALP SERPL-CCNC: 81 U/L (ref 35–104)
ALT SERPL W P-5'-P-CCNC: 14 U/L (ref 10–35)
ANION GAP SERPL CALCULATED.3IONS-SCNC: 13 MMOL/L (ref 7–15)
AST SERPL W P-5'-P-CCNC: 27 U/L (ref 10–35)
BILIRUB SERPL-MCNC: 0.3 MG/DL
BUN SERPL-MCNC: 14.8 MG/DL (ref 8–23)
CALCIUM SERPL-MCNC: 9.4 MG/DL (ref 8.8–10.2)
CHLORIDE SERPL-SCNC: 97 MMOL/L (ref 98–107)
CREAT SERPL-MCNC: 0.61 MG/DL (ref 0.51–0.95)
DEPRECATED HCO3 PLAS-SCNC: 22 MMOL/L (ref 22–29)
ERYTHROCYTE [DISTWIDTH] IN BLOOD BY AUTOMATED COUNT: 12.8 % (ref 10–15)
GFR SERPL CREATININE-BSD FRML MDRD: 87 ML/MIN/1.73M2
GLUCOSE SERPL-MCNC: 115 MG/DL (ref 70–99)
HCT VFR BLD AUTO: 38.7 % (ref 35–47)
HGB BLD-MCNC: 12.5 G/DL (ref 11.7–15.7)
LDLC SERPL DIRECT ASSAY-MCNC: 88 MG/DL
MCH RBC QN AUTO: 32 PG (ref 26.5–33)
MCHC RBC AUTO-ENTMCNC: 32.3 G/DL (ref 31.5–36.5)
MCV RBC AUTO: 99 FL (ref 78–100)
PLATELET # BLD AUTO: 247 10E3/UL (ref 150–450)
POTASSIUM SERPL-SCNC: 4 MMOL/L (ref 3.4–5.3)
PROT SERPL-MCNC: 7.5 G/DL (ref 6.4–8.3)
RBC # BLD AUTO: 3.91 10E6/UL (ref 3.8–5.2)
SODIUM SERPL-SCNC: 132 MMOL/L (ref 136–145)
WBC # BLD AUTO: 7 10E3/UL (ref 4–11)

## 2022-10-18 PROCEDURE — 36415 COLL VENOUS BLD VENIPUNCTURE: CPT | Performed by: INTERNAL MEDICINE

## 2022-10-18 PROCEDURE — 99214 OFFICE O/P EST MOD 30 MIN: CPT | Mod: 25 | Performed by: INTERNAL MEDICINE

## 2022-10-18 PROCEDURE — G0008 ADMIN INFLUENZA VIRUS VAC: HCPCS | Performed by: INTERNAL MEDICINE

## 2022-10-18 PROCEDURE — 83721 ASSAY OF BLOOD LIPOPROTEIN: CPT | Performed by: INTERNAL MEDICINE

## 2022-10-18 PROCEDURE — 80053 COMPREHEN METABOLIC PANEL: CPT | Performed by: INTERNAL MEDICINE

## 2022-10-18 PROCEDURE — 91312 COVID-19,PF,PFIZER BOOSTER BIVALENT: CPT | Performed by: INTERNAL MEDICINE

## 2022-10-18 PROCEDURE — G0439 PPPS, SUBSEQ VISIT: HCPCS | Performed by: INTERNAL MEDICINE

## 2022-10-18 PROCEDURE — 0124A COVID-19,PF,PFIZER BOOSTER BIVALENT: CPT | Performed by: INTERNAL MEDICINE

## 2022-10-18 PROCEDURE — 85027 COMPLETE CBC AUTOMATED: CPT | Performed by: INTERNAL MEDICINE

## 2022-10-18 PROCEDURE — 90662 IIV NO PRSV INCREASED AG IM: CPT | Performed by: INTERNAL MEDICINE

## 2022-10-18 ASSESSMENT — ENCOUNTER SYMPTOMS
NERVOUS/ANXIOUS: 1
ARTHRALGIAS: 1

## 2022-10-18 ASSESSMENT — ACTIVITIES OF DAILY LIVING (ADL): CURRENT_FUNCTION: NO ASSISTANCE NEEDED

## 2022-10-18 NOTE — PATIENT INSTRUCTIONS
No change in medication treatment plan.  Goal blood pressure less than 140/85.    If you are getting blood pressures less than 110/60, or lightheaded dizzy spells worsening, or heart rates less than 50, contact me in clinic for reevaluation of your blood pressure medications.    Continue to stay active over the winter.  At least 20 minutes of aerobic type activity 3-4 times a week or more is recommended.  I would recommend restarting [employer] sneakers program.    Follow-up in 6 months for a blood pressure recheck appointment, not fasting.    Because of your carotid artery stenosis, you could consider starting aspirin 81 mg a day to reduce stroke risk.  But aspirin does have bleeding risk.  You can choose not to use the aspirin in order to avoid the bleeding risk.    Continue see ophthalmology on a yearly basis, as you are doing.

## 2022-10-18 NOTE — PROGRESS NOTES
SUBJECTIVE:   Martha is a 87 year old who presents for Preventive Visit.  There is independently with , is caregiving for her .    Patient's anxiety is significantly less.  She has recovered fully from COVID in July.    She has not started the [employer] sneakers program again, doing yard work and walking outside, good exertional ability currently.    No chest pain or chest pressure.  Occasional palpitations consistent with PVCs but no lightheaded dizzy spells.    She has checked her blood pressure and heart rate regularly.  Blood pressure running 136/58-1 44/54.  Most numbers in the 130s to 140s over 60s.  Heart rate in the 58- 70 range.    Continues on losartan 100 mg a day, amlodipine 5 mg a day and atenolol 25 mg a day.  No edema.    Denies significant daytime sleepiness.  No history of sleep apnea.  No edema concerns.    Patient did have carotid ultrasound October 2022 and it did show 50-69% carotid artery stenosis on the right, left was less than 50%.  No history of TIA symptoms.    No history of GI bleeding or ulcer.  No history of falls.    Last year her LDL was 84 on Lipitor 10 mg.  She has been tolerating that for many years.    No family history of coronary disease.  Quit smoking at age 36.  Has not had chest pain or chest pressure.    He did have a low sodium when she was sick with COVID in July.  She is eating and drinking normally she states now.    No headache complaints.    She did see ophthalmology in the past year and no vision problems.    No swallowing difficulty or heartburn or abdominal pain.  Bowels are regular.  No urinary complaints.    She denies any breast changes or new lumps.  She is not doing any further mammogram or colon cancer screening.    2018 DEXA scan showed a femur score -1.4/-1.4 with a spine score -1.2.  No fracture history.  She does not want to repeat DEXA scan.    Patient has been advised of split billing requirements and indicates understanding: Yes  Are  "you in the first 12 months of your Medicare coverage?  No    Healthy Habits:     In general, how would you rate your overall health?  Good    Frequency of exercise:  4-5 days/week    Duration of exercise:  15-30 minutes    Do you usually eat at least 4 servings of fruit and vegetables a day, include whole grains    & fiber and avoid regularly eating high fat or \"junk\" foods?  No    Taking medications regularly:  Yes    Medication side effects:  None    Ability to successfully perform activities of daily living:  No assistance needed    Home Safety:  No safety concerns identified    Hearing Impairment:  No hearing concerns    In the past 6 months, have you been bothered by leaking of urine?  No    In general, how would you rate your overall mental or emotional health?  Fair      PHQ-2 Total Score: 2    Do you feel safe in your environment? Yes    Have you ever done Advance Care Planning? (For example, a Health Directive, POLST, or a discussion with a medical provider or your loved ones about your wishes): No, advance care planning information given to patient to review.  Patient plans to discuss their wishes with loved ones or provider.         Fall risk  Fallen 2 or more times in the past year?: No  Any fall with injury in the past year?: No  click delete button to remove this line now  Cognitive Screening   1) Repeat 3 items (Leader, Season, Table)    2) Clock draw: NORMAL  3) 3 item recall: Recalls 2 objects   Results: NORMAL clock, 1-2 items recalled: COGNITIVE IMPAIRMENT LESS LIKELY    Mini-CogTM Copyright LAURITA Alfred. Licensed by the author for use in [facility]; reprinted with permission (rafa@.[email or web address]). All rights reserved.      Do you have sleep apnea, excessive snoring or daytime drowsiness?: no    Reviewed and updated as needed this visit by clinical staff   Tobacco  Allergies  Meds              Reviewed and updated as needed this visit by Provider     Meds             Social History "     Tobacco Use     Smoking status: Former     Packs/day: 0.00     Types: Cigarettes     Smokeless tobacco: Never   Substance Use Topics     Alcohol use: Yes     Comment: Alcoholic Drinks/day: occasional     If you drink alcohol do you typically have >3 drinks per day or >7 drinks per week? No    Alcohol Use 10/18/2022   Prescreen: >3 drinks/day or >7 drinks/week? No   No flowsheet data found.        Current providers sharing in care for this patient include:   Patient Care Team:  Raul Lewis MD as PCP - General (Internal Medicine)  Raul Lewis MD as Assigned PCP  Heather Green MD as Assigned Surgical Provider    The following health maintenance items are reviewed in Epic and correct as of today:  Health Maintenance   Topic Date Due     ANNUAL REVIEW OF  ORDERS  Never done     HEPATITIS B IMMUNIZATION (1 of 3 - 3-dose series) Never done     ZOSTER IMMUNIZATION (1 of 2) Never done     MEDICARE ANNUAL WELLNESS VISIT  09/06/2019     INFLUENZA VACCINE (1) 09/01/2022     FALL RISK ASSESSMENT  10/18/2023     DTAP/TDAP/TD IMMUNIZATION (2 - Td or Tdap) 06/22/2026     ADVANCE CARE PLANNING  10/18/2027     PHQ-2 (once per calendar year)  Completed     Pneumococcal Vaccine: 65+ Years  Completed     COVID-19 Vaccine  Completed     IPV IMMUNIZATION  Aged Out     MENINGITIS IMMUNIZATION  Aged Out     Lab work is in process  Patient Active Problem List   Diagnosis     Essential hypertension     Osteoporosis     Hyponatremia     Mixed hyperlipidemia     Personal history of COVID-19     Past Surgical History:   Procedure Laterality Date     HYSTERECTOMY  [id number]     OOPHORECTOMY  4861-1294       Social History     Tobacco Use     Smoking status: Former     Packs/day: 0.00     Types: Cigarettes     Smokeless tobacco: Never   Substance Use Topics     Alcohol use: Yes     Comment: Alcoholic Drinks/day: occasional     Family History   Problem Relation Age of Onset     Skin Cancer Father      Stomach Cancer  "Maternal Grandmother          Current Outpatient Medications   Medication Sig Dispense Refill     acetaminophen (TYLENOL) 500 MG tablet Take 500-1,000 mg by mouth every 6 hours as needed for mild pain       amLODIPine (NORVASC) 5 MG tablet Take 1 tablet (5 mg) by mouth daily 90 tablet 3     aspirin (ASA) 81 MG EC tablet Take 1 tablet (81 mg) by mouth daily       atenolol (TENORMIN) 25 MG tablet Take 1 tablet (25 mg total) by mouth daily. 90 tablet 3     atorvastatin (LIPITOR) 10 MG tablet Take 1 tablet (10 mg) by mouth At Bedtime 90 tablet 3     calcium carbonate (OS-FANNIE) 1500 (600 Ca) MG tablet Take 600 mg by mouth daily       calcium carbonate (TUMS) 500 MG chewable tablet Take 1 chew tab by mouth daily       cholecalciferol 50 MCG (2000 UT) CAPS Take 1 capsule by mouth daily        losartan (COZAAR) 100 MG tablet Take 1 tablet (100 mg total) by mouth daily. 90 tablet 3     melatonin 3 MG tablet Take 1 tablet (3 mg) by mouth nightly as needed for sleep (Patient not taking: Reported on 10/18/2022)       Allergies   Allergen Reactions     Fosinopril Cough       Pertinent mammograms are reviewed under the imaging tab.    Review of Systems   Musculoskeletal: Positive for arthralgias.   Psychiatric/Behavioral: The patient is nervous/anxious.      Remainder review of systems is negative except as noted above    OBJECTIVE:   BP (!) 142/68   Pulse 87   Ht 1.537 m (5' 0.5\")   Wt 49 kg (108 lb)   SpO2 97%   BMI 20.75 kg/m   Estimated body mass index is 20.75 kg/m  as calculated from the following:    Height as of this encounter: 1.537 m (5' 0.5\").    Weight as of this encounter: 49 kg (108 lb).  Physical Exam  Alert and oriented x3.  Good clock face drawing.  2 out of 3 word recall.  Cognition within normal limits.  Mobility exam normal, no parkinsonian signs.  Good mood and affect, does not appear anxious.  Pupils and irises equal and reactive.  Extraocular muscles intact.  No jaundice or conjunctivitis.  External " ears and nose exam is normal.  Moderate cerumen but not obstructing, normal tympanic membranes.  No cervical or supraclavicular or axillary adenopathy.  No JVD and no carotid bruits.  No thyromegaly or nodularity.  Lungs are clear to auscultation with good respiratory excursion.  Heart is regular without murmur rub or gallop.  No ankle edema.  Abdomen is thin, nontender and no hepatosplenomegaly.  No pulsatile abdominal mass.  Skin exam without suspicious skin lesions.  She declined breast exam.    ASSESSMENT / PLAN:   (Z00.00) Encounter for wellness examination in adult  (primary encounter diagnosis)  Comment: Patient's main issue is maintaining mobility and she is doing well at.  Continue regular exercise and I suggested Silver sneakers at the [facility] to restart.    Cognitively intact.  Anxiety is less of an issue, not needing medication.  Plan: She has full code.    Continue yearly eye exams.    No further colon cancer or breast cancer screening with age.  She declined any further osteoporosis screening.    (I10) Essential hypertension  Comment: Controlled on recheck.  Borderline high systolic.  No orthostatic type symptoms.  Tolerating heart rate around 60.    If lower heart rate or worsening fatigue or orthostasis occurs, could consider changing atenolol to Toprol-XL 25 mg a day.  Plan: Follow-up in the spring for blood pressure recheck    She was told to contact me if blood pressure running above 140/85, or less than 110/60    (E78.00) Hypercholesterolemia  Comment: With carotid artery stenosis I would have her continue on the atorvastatin 10 mg a day.  Goal LDL less than 100 with age  Plan: LDL cholesterol direct            (Z51.81) Encounter for therapeutic drug monitoring  Comment:   Plan: Comprehensive metabolic panel, CBC with         platelets            (Z23) Need for COVID-19 vaccine  Comment: Given today  Plan: COVID-19,PF,PFIZER BOOSTER BIVALENT (12+YRS)            (Z23) Need for immunization against  "influenza  Comment: Given today  Plan: INFLUENZA, QUAD, HIGH DOSE, PF, 65YR + (FLUZONE        HD)            (I65.21) Asymptomatic carotid artery stenosis, right  Comment: I did discuss aspirin primary prophylaxis for the carotid artery stenosis.  I discussed bleeding risk with aspirin.  I did discuss that the bleeding risk may outweigh the stroke reduction benefit.  Patient can consider aspirin 81 mg a day, if she accepts the bleeding risk.  Plan: aspirin (ASA) 81 MG EC tablet        Continue atorvastatin 10 mg      Patient has been advised of split billing requirements and indicates understanding: Yes    COUNSELING:  Reviewed preventive health counseling, as reflected in patient instructions       Regular exercise       Healthy diet/nutrition       Vision screening       Aspirin prophylaxis     Estimated body mass index is 20.75 kg/m  as calculated from the following:    Height as of this encounter: 1.537 m (5' 0.5\").    Weight as of this encounter: 49 kg (108 lb).    Weight management plan noted, stable and monitoring    She reports that she has quit smoking. Her smoking use included cigarettes. She has never used smokeless tobacco.      Appropriate preventive services were discussed with this patient, including applicable screening as appropriate for cardiovascular disease, diabetes, osteopenia/osteoporosis, and glaucoma.  As appropriate for age/gender, discussed screening for colorectal cancer, prostate cancer, breast cancer, and cervical cancer. Checklist reviewing preventive services available has been given to the patient.    Reviewed patients plan of care and provided an AVS. The Basic Care Plan (routine screening as documented in Health Maintenance) for Martha meets the Care Plan requirement. This Care Plan has been established and reviewed with the Patient.    Counseling Resources:  ATP IV Guidelines  Pooled Cohorts Equation Calculator  Breast Cancer Risk Calculator  Breast Cancer: Medication to Reduce " Risk  FRAX Risk Assessment  ICSI Preventive Guidelines  Dietary Guidelines for Americans, 2010  USDA's MyPlate  ASA Prophylaxis  Lung CA Screening    Raul Lewis MD  [facility]    Identified Health Risks:

## 2022-10-22 ENCOUNTER — MYC MEDICAL ADVICE (OUTPATIENT)
Dept: INTERNAL MEDICINE | Facility: CLINIC | Age: 87
End: 2022-10-22

## 2022-10-22 DIAGNOSIS — E87.1 HYPONATREMIA: Primary | ICD-10-CM

## 2022-10-28 NOTE — TELEPHONE ENCOUNTER
Dr Lewis,    Please see MyC message from patient and advise. Do you want pt to check a sodium level prior to her 6 months follow up?    EVANGELIST WhittingtonN, RN  [facility]

## 2022-11-17 DIAGNOSIS — I10 ESSENTIAL HYPERTENSION: ICD-10-CM

## 2022-11-17 RX ORDER — ATENOLOL 25 MG/1
25 TABLET ORAL DAILY
Qty: 90 TABLET | Refills: 3 | Status: SHIPPED | OUTPATIENT
Start: 2022-11-17 | End: 2023-04-18

## 2022-11-17 NOTE — TELEPHONE ENCOUNTER
"Routing refill request to provider for review/approval because:  BP not in range.    Last Written Prescription Date:  11/22/21  Last Fill Quantity: 90,  # refills: 3   Last office visit provider:  10/18/22     Requested Prescriptions   Pending Prescriptions Disp Refills     atenolol (TENORMIN) 25 MG tablet 90 tablet 3     Sig: Take 1 tablet (25 mg) by mouth daily       Beta-Blockers Protocol Failed - 11/17/2022 12:53 PM        Failed - Blood pressure under 140/90 in past 12 months     BP Readings from Last 3 Encounters:   10/18/22 (!) 142/68   08/12/22 122/70   07/29/22 (!) 140/60                 Passed - Patient is age 6 or older        Passed - Recent (12 mo) or future (30 days) visit within the authorizing provider's specialty     Patient has had an office visit with the authorizing provider or a provider within the authorizing providers department within the previous 12 mos or has a future within next 30 days. See \"Patient Info\" tab in inbasket, or \"Choose Columns\" in Meds & Orders section of the refill encounter.              Passed - Medication is active on med list             Jose Trevino RN 11/17/22 12:53 PM  "

## 2022-11-17 NOTE — TELEPHONE ENCOUNTER
Medication Question or Refill        What medication are you calling about (include dose and sig)?:   atenolol (TENORMIN) 25 MG tablet   3 ordered         Summary: Take 1 tablet (25 mg total) by mouth daily., Disp-90 tablet, R-3, E-Prescribe   Start: 11/22/2021  Ord/Sold: 11/22/2021 (O)  Report  Adh:   Taking            Controlled Substance Agreement on file:   CSA -- Patient Level:    CSA: None found at the patient level.       Who prescribed the medication?: Joshua    Do you need a refill? Yes:     When did you use the medication last? today    Patient offered an appointment? No    Do you have any questions or concerns?  Needs refill today    Preferred Pharmacy:       [facility] [id number] [place] [address] [address] [address]  Phone: [phone] Fax: [phone]      Could we send this information to you in [facility] or would you prefer to receive a phone call?:   Patient would prefer a phone call   Okay to leave a detailed message?: Yes at Cell number on file:    Telephone Information:   Mobile [phone]

## 2022-12-29 ENCOUNTER — TELEPHONE (OUTPATIENT)
Dept: FAMILY MEDICINE | Facility: CLINIC | Age: 87
End: 2022-12-29

## 2022-12-29 DIAGNOSIS — E78.00 HYPERCHOLESTEROLEMIA: ICD-10-CM

## 2022-12-29 RX ORDER — ATORVASTATIN CALCIUM 10 MG/1
10 TABLET, FILM COATED ORAL AT BEDTIME
Qty: 90 TABLET | Refills: 2 | Status: SHIPPED | OUTPATIENT
Start: 2022-12-29 | End: 2023-04-18

## 2022-12-29 NOTE — TELEPHONE ENCOUNTER
Racquel Pharmacist at [facility]. Requesting refill of Atorvastatin.     Refill encounter initiated and routed to refill pool.    Kind regards,  Sherlyn ASIF  [facility]

## 2022-12-29 NOTE — TELEPHONE ENCOUNTER
"Last Written Prescription Date:  11/16/21  Last Fill Quantity: 90,  # refills: 3   Last office visit provider:  10/18/22     Requested Prescriptions   Pending Prescriptions Disp Refills     atorvastatin (LIPITOR) 10 MG tablet 90 tablet 2     Sig: Take 1 tablet (10 mg) by mouth At Bedtime       Statins Protocol Passed - 12/29/2022  4:04 PM        Passed - LDL on file in past 12 months     Recent Labs   Lab Test 10/18/22  1408   LDL 88             Passed - No abnormal creatine kinase in past 12 months     Recent Labs   Lab Test 05/14/21  0901                   Passed - Recent (12 mo) or future (30 days) visit within the authorizing provider's specialty     Patient has had an office visit with the authorizing provider or a provider within the authorizing providers department within the previous 12 mos or has a future within next 30 days. See \"Patient Info\" tab in inbasket, or \"Choose Columns\" in Meds & Orders section of the refill encounter.              Passed - Medication is active on med list        Passed - Patient is age 18 or older        Passed - No active pregnancy on record        Passed - No positive pregnancy test in past 12 months             Tammi Dawson, RN 12/29/22 4:07 PM  "

## 2023-01-23 DIAGNOSIS — I10 ESSENTIAL HYPERTENSION: ICD-10-CM

## 2023-01-24 RX ORDER — AMLODIPINE BESYLATE 5 MG/1
5 TABLET ORAL DAILY
Qty: 90 TABLET | Refills: 3 | Status: SHIPPED | OUTPATIENT
Start: 2023-01-24 | End: 2024-01-25

## 2023-01-24 NOTE — TELEPHONE ENCOUNTER
"Routing refill request to provider for review/approval because:  BP outside parameters.    Last Written Prescription Date:  1/5/2022  Last Fill Quantity: 90,  # refills: 3   Last office visit provider:  10/18/2022     Requested Prescriptions   Pending Prescriptions Disp Refills     amLODIPine (NORVASC) 5 MG tablet 90 tablet 3     Sig: Take 1 tablet (5 mg) by mouth daily       Calcium Channel Blockers Protocol  Failed - 1/23/2023  3:13 PM        Failed - Blood pressure under 140/90 in past 12 months     BP Readings from Last 3 Encounters:   10/18/22 (!) 142/68   08/12/22 122/70   07/29/22 (!) 140/60                 Passed - Recent (12 mo) or future (30 days) visit within the authorizing provider's specialty     Patient has had an office visit with the authorizing provider or a provider within the authorizing providers department within the previous 12 mos or has a future within next 30 days. See \"Patient Info\" tab in inbasket, or \"Choose Columns\" in Meds & Orders section of the refill encounter.              Passed - Medication is active on med list        Passed - Patient is age 18 or older        Passed - No active pregnancy on record        Passed - Normal serum creatinine on file in past 12 months     Recent Labs   Lab Test 10/18/22  1408   CR 0.61       Ok to refill medication if creatinine is low          Passed - No positive pregnancy test in past 12 months             lAba Santa RN 01/24/23 2:21 PM  "

## 2023-04-18 ENCOUNTER — OFFICE VISIT (OUTPATIENT)
Dept: INTERNAL MEDICINE | Facility: CLINIC | Age: 88
End: 2023-04-18
Payer: MEDICARE

## 2023-04-18 VITALS
WEIGHT: 113 LBS | OXYGEN SATURATION: 98 % | RESPIRATION RATE: 18 BRPM | BODY MASS INDEX: 21.34 KG/M2 | DIASTOLIC BLOOD PRESSURE: 58 MMHG | TEMPERATURE: 97.9 F | SYSTOLIC BLOOD PRESSURE: 150 MMHG | HEART RATE: 62 BPM | HEIGHT: 61 IN

## 2023-04-18 DIAGNOSIS — I10 ESSENTIAL HYPERTENSION: Primary | ICD-10-CM

## 2023-04-18 DIAGNOSIS — Z51.81 ENCOUNTER FOR THERAPEUTIC DRUG MONITORING: ICD-10-CM

## 2023-04-18 DIAGNOSIS — E78.00 HYPERCHOLESTEROLEMIA: ICD-10-CM

## 2023-04-18 DIAGNOSIS — I65.21 ASYMPTOMATIC CAROTID ARTERY STENOSIS, RIGHT: ICD-10-CM

## 2023-04-18 LAB
ALBUMIN SERPL BCG-MCNC: 4.5 G/DL (ref 3.5–5.2)
ALP SERPL-CCNC: 69 U/L (ref 35–104)
ALT SERPL W P-5'-P-CCNC: 19 U/L (ref 10–35)
ANION GAP SERPL CALCULATED.3IONS-SCNC: 11 MMOL/L (ref 7–15)
AST SERPL W P-5'-P-CCNC: 26 U/L (ref 10–35)
BILIRUB SERPL-MCNC: 0.5 MG/DL
BUN SERPL-MCNC: 12.9 MG/DL (ref 8–23)
CALCIUM SERPL-MCNC: 9.9 MG/DL (ref 8.8–10.2)
CHLORIDE SERPL-SCNC: 102 MMOL/L (ref 98–107)
CHOLEST SERPL-MCNC: 170 MG/DL
CREAT SERPL-MCNC: 0.79 MG/DL (ref 0.51–0.95)
DEPRECATED HCO3 PLAS-SCNC: 25 MMOL/L (ref 22–29)
GFR SERPL CREATININE-BSD FRML MDRD: 72 ML/MIN/1.73M2
GLUCOSE SERPL-MCNC: 97 MG/DL (ref 70–99)
HDLC SERPL-MCNC: 63 MG/DL
LDLC SERPL CALC-MCNC: 90 MG/DL
NONHDLC SERPL-MCNC: 107 MG/DL
POTASSIUM SERPL-SCNC: 4.5 MMOL/L (ref 3.4–5.3)
PROT SERPL-MCNC: 7.4 G/DL (ref 6.4–8.3)
SODIUM SERPL-SCNC: 138 MMOL/L (ref 136–145)
TRIGL SERPL-MCNC: 83 MG/DL

## 2023-04-18 PROCEDURE — 36415 COLL VENOUS BLD VENIPUNCTURE: CPT | Performed by: INTERNAL MEDICINE

## 2023-04-18 PROCEDURE — 99214 OFFICE O/P EST MOD 30 MIN: CPT | Performed by: INTERNAL MEDICINE

## 2023-04-18 PROCEDURE — 80053 COMPREHEN METABOLIC PANEL: CPT | Performed by: INTERNAL MEDICINE

## 2023-04-18 PROCEDURE — 80061 LIPID PANEL: CPT | Performed by: INTERNAL MEDICINE

## 2023-04-18 RX ORDER — ATORVASTATIN CALCIUM 10 MG/1
10 TABLET, FILM COATED ORAL AT BEDTIME
Qty: 90 TABLET | Refills: 2 | Status: SHIPPED | OUTPATIENT
Start: 2023-04-18 | End: 2023-10-24

## 2023-04-18 RX ORDER — LOSARTAN POTASSIUM 100 MG/1
TABLET ORAL
Qty: 90 TABLET | Refills: 3 | Status: SHIPPED | OUTPATIENT
Start: 2023-04-18 | End: 2024-07-09

## 2023-04-18 RX ORDER — ATENOLOL 25 MG/1
25 TABLET ORAL DAILY
Qty: 90 TABLET | Refills: 3 | Status: SHIPPED | OUTPATIENT
Start: 2023-04-18 | End: 2023-10-24

## 2023-04-18 NOTE — PROGRESS NOTES
Martha A Pedro Pablo   87 year old female    Date of Visit: 4/18/2023    Chief Complaint   Patient presents with     Follow Up     6 mo BP check, fasting     Subjective  87-year-old female here for follow-up on hypertension and cholesterol.    Blood pressure has been controlled in the 140s/60s on current losartan 100 mg a day, atenolol 25 mg a day and amlodipine 5 mg a day.    She is active in the house, still caring for her  and has not been able to make it to the [facility].  But she feels her exertional ability is stable.  No increasing shortness of breath or chest pain or palpitations or edema.    October 2022 carotid ultrasound showed 50-69% on the right, left less than 50%.  No history of stroke.  She continues on the atorvastatin 10 mg.  No generalized myalgias or new weakness.    She has been eating better, has gained 5 pounds.  Denies abdominal pain.  No history of sleep apnea.    Labs at last visit showed an LDL of 88.  Sugar was 115 but nonfasting.    She did have a mildly low sodium previously.      PMHx:  No past medical history on file.  PSHx:    Past Surgical History:   Procedure Laterality Date     HYSTERECTOMY  [id number]     OOPHORECTOMY  9655-9787     Immunizations:   Immunization History   Administered Date(s) Administered     COVID-19 Vaccine 12+ (Pfizer 2022) 04/11/2022     COVID-19 Vaccine 12+ (Pfizer) 02/10/2021, 03/03/2021, 09/29/2021     COVID-19 Vaccine Bivalent Booster 12+ (Pfizer) 10/18/2022     FLU 6-35 months 10/17/2012     Influenza (High Dose) 3 valent vaccine 09/15/2015, 11/10/2016, 10/10/2017, 10/23/2018     Influenza (IIV3) PF 09/23/2009, 11/08/2013, 09/25/2014     Influenza Vaccine 50-64 or 18-64 w/egg allergy (Flublok) 10/08/2019     Influenza Vaccine 65+ (Fluzone HD) 12/07/2020, 10/18/2022     Pneumo Conj 13-V (2010&after) 06/22/2016     Pneumococcal 23 valent 10/08/2002     TDAP Vaccine (Adacel) 06/22/2016     Td (Adult), Adsorbed 10/08/2002       ROS A comprehensive review of  "systems was performed and was otherwise negative    Medications, allergies, and problem list were reviewed and updated    Exam  BP (!) 150/58   Pulse 62   Temp 97.9  F (36.6  C)   Resp 18   Ht 1.537 m (5' 0.5\")   Wt 51.3 kg (113 lb)   SpO2 98%   BMI 21.71 kg/m    She appears well.  Bright and alert.  No jaundice.  Lungs clear.  Heart is regular without murmur.Abdomen is nontender and no edema    Assessment/Plan  1. Essential hypertension  Isolated systolic hypertension when in clinic.  Her range at home was 120-147/50-60s.  She denies orthostasis.  No edema issues.    Continue on current medications.  Monitor for high blood pressures or low blood pressures, see patient instructions.  Follow-up for physical exam in the fall.  Also on amlodipine 5 mg a day  - losartan (COZAAR) 100 MG tablet; Take 1 tablet (100 mg total) by mouth daily.  Dispense: 90 tablet; Refill: 3  - atenolol (TENORMIN) 25 MG tablet; Take 1 tablet (25 mg) by mouth daily  Dispense: 90 tablet; Refill: 3    2. Asymptomatic carotid artery stenosis, right  Asymptomatic.  Tolerating Lipitor.  Cholesterol adequately controlled on last check.  Goal LDL less than 100.  Not planning further increase of atorvastatin, with age    We did discuss aspirin 81 mg.  Aspirin can reduce stroke risk but increase bleeding risk.  She can decide whether she wishes to take aspirin to reduce stroke risk, but she was warned that would increase bleeding risk.    3. Encounter for therapeutic drug monitoring    - Comprehensive metabolic panel    4. Hypercholesterolemia  Continue same  - atorvastatin (LIPITOR) 10 MG tablet; Take 1 tablet (10 mg) by mouth At Bedtime  Dispense: 90 tablet; Refill: 2  - Lipid Profile      Return in about 6 months (around 10/19/2023) for Adult wellness visit physical exam.   Patient Instructions   Continue on same medication at this time.  Continue on atorvastatin.    You can consider taking aspirin 81 mg a day to reduce stroke risk, but there " is a bleeding risk with taking aspirin.    If blood pressure is getting above 150 systolic more often, or if it is more consistently running above 140, contact me to consider medication adjustment.  Contact me if blood pressure gets less than 110/60 or you get lightheaded dizzy spells.    Increase daily walking and activity this summer.    Follow-up after October 18 for your physical exam.    Raul Lewis MD, MD        Current Outpatient Medications   Medication Sig Dispense Refill     acetaminophen (TYLENOL) 500 MG tablet Take 500-1,000 mg by mouth every 6 hours as needed for mild pain       amLODIPine (NORVASC) 5 MG tablet Take 1 tablet (5 mg) by mouth daily 90 tablet 3     atenolol (TENORMIN) 25 MG tablet Take 1 tablet (25 mg) by mouth daily 90 tablet 3     atorvastatin (LIPITOR) 10 MG tablet Take 1 tablet (10 mg) by mouth At Bedtime 90 tablet 2     calcium carbonate (OS-FANNIE) 1500 (600 Ca) MG tablet Take 600 mg by mouth daily       calcium carbonate (TUMS) 500 MG chewable tablet Take 1 chew tab by mouth daily       cholecalciferol 50 MCG (2000 UT) CAPS Take 1 capsule by mouth daily        losartan (COZAAR) 100 MG tablet Take 1 tablet (100 mg total) by mouth daily. 90 tablet 3     aspirin (ASA) 81 MG EC tablet Take 1 tablet (81 mg) by mouth daily (Patient not taking: Reported on 4/18/2023)       Allergies   Allergen Reactions     Fosinopril Cough     Social History     Tobacco Use     Smoking status: Former     Packs/day: 0.00     Types: Cigarettes     Smokeless tobacco: Never   Substance Use Topics     Alcohol use: Yes     Comment: Alcoholic Drinks/day: occasional             Subjective   Martha is a 87 year old, presenting for the following health issues:  Follow Up (6 mo BP check, fasting)        4/18/2023    10:03 AM   Additional Questions   Roomed by Kelli JADE   Accompanied by debra         4/18/2023    10:03 AM   Patient Reported Additional Medications   Patient reports taking the following new medications no  "    HPI             Review of Systems         Objective    BP (!) 150/64   Pulse 62   Temp 97.9  F (36.6  C)   Resp 18   Ht 1.537 m (5' 0.5\")   Wt 51.3 kg (113 lb)   SpO2 98%   BMI 21.71 kg/m    Body mass index is 21.71 kg/m .  Physical Exam                       "

## 2023-04-18 NOTE — PATIENT INSTRUCTIONS
Continue on same medication at this time.  Continue on atorvastatin.    You can consider taking aspirin 81 mg a day to reduce stroke risk, but there is a bleeding risk with taking aspirin.    If blood pressure is getting above 150 systolic more often, or if it is more consistently running above 140, contact me to consider medication adjustment.  Contact me if blood pressure gets less than 110/60 or you get lightheaded dizzy spells.    Increase daily walking and activity this summer.    Follow-up after October 18 for your physical exam.

## 2023-10-24 ENCOUNTER — OFFICE VISIT (OUTPATIENT)
Dept: INTERNAL MEDICINE | Facility: CLINIC | Age: 88
End: 2023-10-24
Payer: MEDICARE

## 2023-10-24 VITALS
RESPIRATION RATE: 18 BRPM | BODY MASS INDEX: 21.34 KG/M2 | SYSTOLIC BLOOD PRESSURE: 138 MMHG | HEART RATE: 77 BPM | OXYGEN SATURATION: 98 % | WEIGHT: 113 LBS | DIASTOLIC BLOOD PRESSURE: 60 MMHG | HEIGHT: 61 IN | TEMPERATURE: 98 F

## 2023-10-24 DIAGNOSIS — E87.1 HYPONATREMIA: ICD-10-CM

## 2023-10-24 DIAGNOSIS — Z00.00 ENCOUNTER FOR MEDICARE ANNUAL WELLNESS EXAM: Primary | ICD-10-CM

## 2023-10-24 DIAGNOSIS — I65.21 CAROTID STENOSIS, ASYMPTOMATIC, RIGHT: ICD-10-CM

## 2023-10-24 DIAGNOSIS — I10 ESSENTIAL HYPERTENSION: ICD-10-CM

## 2023-10-24 DIAGNOSIS — E78.00 HYPERCHOLESTEROLEMIA: ICD-10-CM

## 2023-10-24 DIAGNOSIS — Z51.81 ENCOUNTER FOR THERAPEUTIC DRUG MONITORING: ICD-10-CM

## 2023-10-24 DIAGNOSIS — H61.22 IMPACTED CERUMEN OF LEFT EAR: ICD-10-CM

## 2023-10-24 LAB
ALBUMIN SERPL BCG-MCNC: 4.8 G/DL (ref 3.5–5.2)
ALP SERPL-CCNC: 75 U/L (ref 35–104)
ALT SERPL W P-5'-P-CCNC: 19 U/L (ref 0–50)
ANION GAP SERPL CALCULATED.3IONS-SCNC: 10 MMOL/L (ref 7–15)
AST SERPL W P-5'-P-CCNC: 25 U/L (ref 0–45)
BILIRUB SERPL-MCNC: 0.4 MG/DL
BUN SERPL-MCNC: 12.1 MG/DL (ref 8–23)
CALCIUM SERPL-MCNC: 10.2 MG/DL (ref 8.8–10.2)
CHLORIDE SERPL-SCNC: 98 MMOL/L (ref 98–107)
CREAT SERPL-MCNC: 0.59 MG/DL (ref 0.51–0.95)
DEPRECATED HCO3 PLAS-SCNC: 27 MMOL/L (ref 22–29)
EGFRCR SERPLBLD CKD-EPI 2021: 87 ML/MIN/1.73M2
ERYTHROCYTE [DISTWIDTH] IN BLOOD BY AUTOMATED COUNT: 12.9 % (ref 10–15)
GLUCOSE SERPL-MCNC: 107 MG/DL (ref 70–99)
HCT VFR BLD AUTO: 37.9 % (ref 35–47)
HGB BLD-MCNC: 12.5 G/DL (ref 11.7–15.7)
LDLC SERPL DIRECT ASSAY-MCNC: 91 MG/DL
MCH RBC QN AUTO: 32.4 PG (ref 26.5–33)
MCHC RBC AUTO-ENTMCNC: 33 G/DL (ref 31.5–36.5)
MCV RBC AUTO: 98 FL (ref 78–100)
PLATELET # BLD AUTO: 225 10E3/UL (ref 150–450)
POTASSIUM SERPL-SCNC: 4.1 MMOL/L (ref 3.4–5.3)
PROT SERPL-MCNC: 7.5 G/DL (ref 6.4–8.3)
RBC # BLD AUTO: 3.86 10E6/UL (ref 3.8–5.2)
SODIUM SERPL-SCNC: 135 MMOL/L (ref 135–145)
WBC # BLD AUTO: 6.7 10E3/UL (ref 4–11)

## 2023-10-24 PROCEDURE — G0439 PPPS, SUBSEQ VISIT: HCPCS | Performed by: INTERNAL MEDICINE

## 2023-10-24 PROCEDURE — 80053 COMPREHEN METABOLIC PANEL: CPT | Performed by: INTERNAL MEDICINE

## 2023-10-24 PROCEDURE — 83721 ASSAY OF BLOOD LIPOPROTEIN: CPT | Performed by: INTERNAL MEDICINE

## 2023-10-24 PROCEDURE — 85027 COMPLETE CBC AUTOMATED: CPT | Performed by: INTERNAL MEDICINE

## 2023-10-24 PROCEDURE — 99214 OFFICE O/P EST MOD 30 MIN: CPT | Mod: 25 | Performed by: INTERNAL MEDICINE

## 2023-10-24 PROCEDURE — 36415 COLL VENOUS BLD VENIPUNCTURE: CPT | Performed by: INTERNAL MEDICINE

## 2023-10-24 RX ORDER — ATORVASTATIN CALCIUM 10 MG/1
10 TABLET, FILM COATED ORAL AT BEDTIME
Qty: 90 TABLET | Refills: 3 | Status: SHIPPED | OUTPATIENT
Start: 2023-10-24

## 2023-10-24 RX ORDER — ATENOLOL 25 MG/1
25 TABLET ORAL DAILY
Qty: 90 TABLET | Refills: 3 | Status: SHIPPED | OUTPATIENT
Start: 2023-10-24

## 2023-10-24 ASSESSMENT — ENCOUNTER SYMPTOMS
JOINT SWELLING: 0
DIZZINESS: 0
CHILLS: 0
PARESTHESIAS: 0
WEAKNESS: 0
CONSTIPATION: 0
MYALGIAS: 1
HEMATURIA: 0
ARTHRALGIAS: 1
HEMATOCHEZIA: 0
EYE PAIN: 0
ABDOMINAL PAIN: 0
NERVOUS/ANXIOUS: 1
PALPITATIONS: 0
COUGH: 0
DYSURIA: 0
HEARTBURN: 0
FEVER: 0
HEADACHES: 0
FREQUENCY: 0
NAUSEA: 0
SHORTNESS OF BREATH: 0
SORE THROAT: 0
BREAST MASS: 0

## 2023-10-24 ASSESSMENT — ACTIVITIES OF DAILY LIVING (ADL): CURRENT_FUNCTION: MONEY MANAGEMENT REQUIRES ASSISTANCE

## 2023-10-24 NOTE — PATIENT INSTRUCTIONS
Make sure you stay active on a daily basis.  Get at least 20 minutes of activity time every day.  Consider asking for help with your  so that you can go to the [facility] 3 times a week.    See the ENT clinic to remove the earwax impaction in your left ear.    Goal blood pressure less than 150/85 but not less than 110/60.  See me in clinic if your blood pressure is outside that range more than occasionally.    Otherwise, I will see you in 1 year for adult wellness visit physical exam.    You could consider using aspirin 81 mg a day to reduce stroke risk, because you have carotid artery stenosis.  Blood, using aspirin does increase risk of bleeding.  It is reasonable for you to stay off the aspirin at this time.

## 2023-10-24 NOTE — PROGRESS NOTES
SUBJECTIVE:   Martha is a 88 year old who presents for Preventive Visit.    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  She is getting help from family and does get some breaks.  She is doing the yard work and is active in the house.  But she is not going to the [facility] regularly because of her 's needs.    She is an active person and no pain issues that limit activity.  Denies any shortness of breath or chest pain.    High blood pressure is controlled on her home checks in the 120-140/60 range.  Her diastolic tends to be quite low.  She denies orthostasis.  No edema.    On losartan 100 mg a day, amlodipine 5 mg a day and atenolol 25 mg a day.  History of low sodium.  Not on diuretic.    Sleeps well at night.  No palpitations.  No daytime sleepiness.    October 2022 carotid ultrasound with 50-69% on the right and less than 50% on the left but no history of stroke or TIA.  She is not taking an aspirin a day.  No history of bleeding or ulcer.    She is on atorvastatin 10 mg a day.  Cholesterol well controlled last April.  No generalized myalgias.  Normal liver test in April and no history of liver problems.  No new abdominal pain complaints.    She quit smoking at age 36.  No new cough.  No shortness of breath.  No family history of heart disease.    2018 DEXA scan with a femur score -1.4/-1.4 with a spine score of -1.2.  No fracture history.  She has not wanted to repeat DEXA.    Status post hysterectomy with BSO.    She denies any changes in her breasts or new lumps.  She does not wish to do any further mammogram screening.    No change in bowels or blood in stool.    No urinary complaints.    She is having some popping of her left ear with cerumen impaction history.  No ear pain.  Some decreased hearing.    No new headaches.  No swallowing difficulty.    She denies significant memory issues or depression.      10/24/2023    12:47 PM   Additional Questions   Roomed by Kelli JADE  "  Accompanied by na         10/24/2023    12:47 PM   Patient Reported Additional Medications   Patient reports taking the following new medications no       Are you in the first 12 months of your Medicare coverage?  No    Healthy Habits:     In general, how would you rate your overall health?  Good    Frequency of exercise:  1 day/week    Duration of exercise:  15-30 minutes    Do you usually eat at least 4 servings of fruit and vegetables a day, include whole grains    & fiber and avoid regularly eating high fat or \"junk\" foods?  Yes    Taking medications regularly:  Yes    Medication side effects:  None    Ability to successfully perform activities of daily living:  Money management requires assistance    Home Safety:  No safety concerns identified    Hearing Impairment:  No hearing concerns    In the past 6 months, have you been bothered by leaking of urine?  No    In general, how would you rate your overall mental or emotional health?  Good    Additional concerns today:  No      Today's PHQ-2 Score:       10/24/2023    12:46 PM   PHQ-2 ( 1999 Pfizer)   Q1: Little interest or pleasure in doing things 1   Q2: Feeling down, depressed or hopeless 1   PHQ-2 Score 2   Q1: Little interest or pleasure in doing things Several days   Q2: Feeling down, depressed or hopeless Several days   PHQ-2 Score 2           Have you ever done Advance Care Planning? (For example, a Health Directive, POLST, or a discussion with a medical provider or your loved ones about your wishes): Yes, patient states has an Advance Care Planning document and will bring a copy to the clinic.       Fall risk  Fallen 2 or more times in the past year?: No  Any fall with injury in the past year?: No    Cognitive Screening   1) Repeat 3 items (Leader, Season, Table)    2) Clock draw: NORMAL  3) 3 item recall: Recalls 3 objects  Results: 3 items recalled: COGNITIVE IMPAIRMENT LESS LIKELY    Mini-CogTM Copyright S Aubrie. Licensed by the author for use in " [facility]; reprinted with permission (vinnysusanna@[facility]). All rights reserved.      Do you have sleep apnea, excessive snoring or daytime drowsiness? : no    Reviewed and updated as needed this visit by clinical staff   Tobacco  Allergies  Meds              Reviewed and updated as needed this visit by Provider     Meds             Social History     Tobacco Use    Smoking status: Former     Packs/day: 0     Types: Cigarettes     Passive exposure: Past    Smokeless tobacco: Never   Substance Use Topics    Alcohol use: Yes     Comment: Alcoholic Drinks/day: occasional             10/24/2023    12:45 PM   Alcohol Use   Prescreen: >3 drinks/day or >7 drinks/week? No     Do you have a current opioid prescription? No  Do you use any other controlled substances or medications that are not prescribed by a provider? None              Current providers sharing in care for this patient include:   Patient Care Team:  Raul Lewis MD as PCP - General (Internal Medicine)  Raul Lewis MD as Assigned PCP    The following health maintenance items are reviewed in Epic and correct as of today:  Health Maintenance   Topic Date Due    ANNUAL REVIEW OF HM ORDERS  Never done    ZOSTER IMMUNIZATION (1 of 2) Never done    RSV VACCINE 60+ (1 - 1-dose 60+ series) Never done    MEDICARE ANNUAL WELLNESS VISIT  10/18/2023    FALL RISK ASSESSMENT  10/24/2024    DTAP/TDAP/TD IMMUNIZATION (2 - Td or Tdap) 06/22/2026    ADVANCE CARE PLANNING  10/24/2028    PHQ-2 (once per calendar year)  Completed    INFLUENZA VACCINE  Completed    Pneumococcal Vaccine: 65+ Years  Completed    COVID-19 Vaccine  Completed    IPV IMMUNIZATION  Aged Out    HPV IMMUNIZATION  Aged Out    MENINGITIS IMMUNIZATION  Aged Out     Current Outpatient Medications   Medication Sig Dispense Refill    acetaminophen (TYLENOL) 500 MG tablet Take 500-1,000 mg by mouth every 6 hours as needed for mild pain      amLODIPine (NORVASC) 5 MG tablet Take 1 tablet (5 mg)  "by mouth daily 90 tablet 3    atenolol (TENORMIN) 25 MG tablet Take 1 tablet (25 mg) by mouth daily 90 tablet 3    atorvastatin (LIPITOR) 10 MG tablet Take 1 tablet (10 mg) by mouth at bedtime 90 tablet 3    calcium carbonate (OS-FANNIE) 1500 (600 Ca) MG tablet Take 600 mg by mouth daily      calcium carbonate (TUMS) 500 MG chewable tablet Take 1 chew tab by mouth daily      cholecalciferol 50 MCG (2000 UT) CAPS Take 1 capsule by mouth daily       losartan (COZAAR) 100 MG tablet Take 1 tablet (100 mg total) by mouth daily. 90 tablet 3     Allergies   Allergen Reactions    Fosinopril Cough           Pertinent mammograms are reviewed under the imaging tab.    Review of Systems   Constitutional:  Negative for chills and fever.   HENT:  Negative for congestion, ear pain, hearing loss and sore throat.    Eyes:  Negative for pain and visual disturbance.   Respiratory:  Negative for cough and shortness of breath.    Cardiovascular:  Negative for chest pain, palpitations and peripheral edema.   Gastrointestinal:  Negative for abdominal pain, constipation, heartburn, hematochezia and nausea.   Breasts:  Negative for tenderness, breast mass and discharge.   Genitourinary:  Negative for dysuria, frequency, genital sores, hematuria, pelvic pain, urgency, vaginal bleeding and vaginal discharge.   Musculoskeletal:  Positive for arthralgias and myalgias. Negative for joint swelling.   Skin:  Negative for rash.   Neurological:  Negative for dizziness, weakness, headaches and paresthesias.   Psychiatric/Behavioral:  Negative for mood changes. The patient is nervous/anxious.          OBJECTIVE:   /60   Pulse 77   Temp 98  F (36.7  C)   Resp 18   Ht 1.537 m (5' 0.5\")   Wt 51.3 kg (113 lb)   LMP  (LMP Unknown)   SpO2 98%   BMI 21.71 kg/m   Estimated body mass index is 21.71 kg/m  as calculated from the following:    Height as of this encounter: 1.537 m (5' 0.5\").    Weight as of this encounter: 51.3 kg (113 lb).  Physical " Exam  Blood pressure was initially 160/68 but did normalize on my recheck.    Normal word recall and clock face drawing.  Normal mobility.  Good mood and affect.  Pupils and irises equal and reactive.  Extraocular muscles intact.  No jaundice or conjunctivitis.  External ears and nose exam is normal.  She does have cerumen impaction on the left.  Pharynx is normal.  Teeth in good condition.  No cervical or supraclavicular or axillary adenopathy.  No JVD and no carotid bruits.  No thyromegaly or nodularity.  Lungs clear to auscultation with good respiratory excursion.  Heart is regular without murmur rub or gallop.  No ankle edema and +1 pedal pulses.  Feet in good condition.  She does have a hammertoe on her right third toe but without callus.  Abdomen is nonobese, nontender, no hepatosplenomegaly and no pulsatile abdominal mass.  Skin exam was normal to inspection and palpation.  She declined breast exam.    ASSESSMENT / PLAN:   (Z00.00) Encounter for Medicare annual wellness exam  (primary encounter diagnosis)  Comment: Main focus for health maintenance is maintaining her independence and mobility, which she is doing well at.  Emphasized the importance of daily activity.    She does have evidence of vascular disease with carotid artery stenosis but has been asymptomatic.    No further colon or breast cancer screening.    She did see ophthalmology for routine eye exam this year without problems.    She does not want to repeat a DEXA scan.    Status post hysterectomy.    She is full code.    She already had COVID and flu shot, up-to-date on immunizations and does not wish to have Shingrix.  Patient instructions:  Make sure you stay active on a daily basis.  Get at least 20 minutes of activity time every day.  Consider asking for help with your  so that you can go to the [facility] 3 times a week.    See the ENT clinic to remove the earwax impaction in your left ear.    Goal blood pressure less than 150/85 but not  less than 110/60.  See me in clinic if your blood pressure is outside that range more than occasionally.    Otherwise, I will see you in 1 year for adult wellness visit physical exam.    You could consider using aspirin 81 mg a day to reduce stroke risk, because you have carotid artery stenosis.  Blood, using aspirin does increase risk of bleeding.  It is reasonable for you to stay off the aspirin at this time.      (E78.00) Hypercholesterolemia  Comment: Goal LDL less than 100 with her carotid artery stenosis.  Plan: atorvastatin (LIPITOR) 10 MG tablet, LDL         cholesterol direct        No evidence of atorvastatin toxicity.  Continue current dose    (I10) Essential hypertension  Comment: Controlled.  Some isolated systolic hypertension spikes but short-lived.  Her heart rate has been in the 50-60 range.  She was told that if heart rates less than 50 or lightheaded dizzy spells or worsening fatigue, to reevaluate her atorvastatin.    She did not want to follow-up in the spring for a blood pressure checkup and wishes to follow her blood pressure herself and see me in 1 year as long as she is stable  Plan: atenolol (TENORMIN) 25 MG tablet        Continue losartan and amlodipine.  No edema issues.    History of low sodium.    (I65.21) Carotid stenosis, asymptomatic, right  Comment: Asymptomatic.  We discussed the aspirin, see patient discussion.  She decided not to use an aspirin.  Plan:     (Z51.81) Encounter for therapeutic drug monitoring  Comment:   Plan: CBC with platelets, Comprehensive metabolic         panel            (H61.22) Impacted cerumen of left ear  Comment: Refer to ENT for impacted cerumen with symptoms  Plan: Adult ENT  Referral            Patient has been advised of split billing requirements and indicates understanding: Yes      COUNSELING:  Reviewed preventive health counseling, as reflected in patient instructions       Regular exercise       Healthy diet/nutrition       Vision  screening        She reports that she has quit smoking. Her smoking use included cigarettes. She has been exposed to tobacco smoke. She has never used smokeless tobacco.      Appropriate preventive services were discussed with this patient, including applicable screening as appropriate for fall prevention, nutrition, physical activity, Tobacco-use cessation, weight loss and cognition.  Checklist reviewing preventive services available has been given to the patient.    Reviewed patients plan of care and provided an AVS. The Basic Care Plan (routine screening as documented in Health Maintenance) for Martha meets the Care Plan requirement. This Care Plan has been established and reviewed with the Patient.          Raul Lewis MD  [facility]    Identified Health Risks:  I have reviewed Opioid Use Disorder and Substance Use Disorder risk factors and made any needed referrals.

## 2024-01-19 ENCOUNTER — TRANSFERRED RECORDS (OUTPATIENT)
Dept: HEALTH INFORMATION MANAGEMENT | Facility: CLINIC | Age: 89
End: 2024-01-19
Payer: MEDICARE

## 2024-01-25 ENCOUNTER — TELEPHONE (OUTPATIENT)
Dept: INTERNAL MEDICINE | Facility: CLINIC | Age: 89
End: 2024-01-25
Payer: MEDICARE

## 2024-01-25 DIAGNOSIS — I10 ESSENTIAL HYPERTENSION: ICD-10-CM

## 2024-01-25 RX ORDER — AMLODIPINE BESYLATE 5 MG/1
5 TABLET ORAL DAILY
Qty: 90 TABLET | Refills: 3 | Status: SHIPPED | OUTPATIENT
Start: 2024-01-25 | End: 2024-04-03

## 2024-01-25 NOTE — TELEPHONE ENCOUNTER
Prescription approved per [facility] Refill Protocol.    Last Written Prescription Date: 1/24/23  Last Fill Quantity: 90,  # refills: 3   Last office visit: 10/24/2023              Medication Question or Refill      What medication are you calling about (include dose and sig)?: Amlodpine 5mg    Preferred Pharmacy:   [id number] [place], [place] - [address] [address]  Phone: [phone] Fax: [phone]      Who prescribed the medication?: Dr. Lewis    Do you need a refill? Yes    Do you have any questions or concerns?  Yes: Pharmacy calling, stating they requested a refill of amlodipine 1 week ago and have not received refill. Requesting refill as soon as possible.

## 2024-02-14 ENCOUNTER — OFFICE VISIT (OUTPATIENT)
Dept: INTERNAL MEDICINE | Facility: CLINIC | Age: 89
End: 2024-02-14
Payer: MEDICARE

## 2024-02-14 VITALS
TEMPERATURE: 97.9 F | BODY MASS INDEX: 21.71 KG/M2 | DIASTOLIC BLOOD PRESSURE: 58 MMHG | HEIGHT: 61 IN | WEIGHT: 115 LBS | SYSTOLIC BLOOD PRESSURE: 150 MMHG | HEART RATE: 75 BPM | OXYGEN SATURATION: 97 % | RESPIRATION RATE: 16 BRPM

## 2024-02-14 DIAGNOSIS — Z51.81 ENCOUNTER FOR THERAPEUTIC DRUG MONITORING: ICD-10-CM

## 2024-02-14 DIAGNOSIS — H93.13 TINNITUS, BILATERAL: ICD-10-CM

## 2024-02-14 DIAGNOSIS — H61.23 BILATERAL IMPACTED CERUMEN: ICD-10-CM

## 2024-02-14 DIAGNOSIS — I10 ESSENTIAL HYPERTENSION: Primary | ICD-10-CM

## 2024-02-14 LAB
ALBUMIN SERPL BCG-MCNC: 4.7 G/DL (ref 3.5–5.2)
ALP SERPL-CCNC: 77 U/L (ref 40–150)
ALT SERPL W P-5'-P-CCNC: 23 U/L (ref 0–50)
ANION GAP SERPL CALCULATED.3IONS-SCNC: 10 MMOL/L (ref 7–15)
AST SERPL W P-5'-P-CCNC: 26 U/L (ref 0–45)
BILIRUB SERPL-MCNC: 0.4 MG/DL
BUN SERPL-MCNC: 13.3 MG/DL (ref 8–23)
CALCIUM SERPL-MCNC: 10.1 MG/DL (ref 8.8–10.2)
CHLORIDE SERPL-SCNC: 102 MMOL/L (ref 98–107)
CREAT SERPL-MCNC: 0.69 MG/DL (ref 0.51–0.95)
DEPRECATED HCO3 PLAS-SCNC: 27 MMOL/L (ref 22–29)
EGFRCR SERPLBLD CKD-EPI 2021: 84 ML/MIN/1.73M2
ERYTHROCYTE [DISTWIDTH] IN BLOOD BY AUTOMATED COUNT: 12.9 % (ref 10–15)
GLUCOSE SERPL-MCNC: 133 MG/DL (ref 70–99)
HCT VFR BLD AUTO: 40.3 % (ref 35–47)
HGB BLD-MCNC: 13 G/DL (ref 11.7–15.7)
MCH RBC QN AUTO: 32.1 PG (ref 26.5–33)
MCHC RBC AUTO-ENTMCNC: 32.3 G/DL (ref 31.5–36.5)
MCV RBC AUTO: 100 FL (ref 78–100)
PLATELET # BLD AUTO: 231 10E3/UL (ref 150–450)
POTASSIUM SERPL-SCNC: 3.9 MMOL/L (ref 3.4–5.3)
PROT SERPL-MCNC: 7.4 G/DL (ref 6.4–8.3)
RBC # BLD AUTO: 4.05 10E6/UL (ref 3.8–5.2)
SODIUM SERPL-SCNC: 139 MMOL/L (ref 135–145)
WBC # BLD AUTO: 6.6 10E3/UL (ref 4–11)

## 2024-02-14 PROCEDURE — 80053 COMPREHEN METABOLIC PANEL: CPT | Performed by: INTERNAL MEDICINE

## 2024-02-14 PROCEDURE — 99214 OFFICE O/P EST MOD 30 MIN: CPT | Performed by: INTERNAL MEDICINE

## 2024-02-14 PROCEDURE — 36415 COLL VENOUS BLD VENIPUNCTURE: CPT | Performed by: INTERNAL MEDICINE

## 2024-02-14 PROCEDURE — 85027 COMPLETE CBC AUTOMATED: CPT | Performed by: INTERNAL MEDICINE

## 2024-02-14 RX ORDER — HYDROXYZINE HYDROCHLORIDE 10 MG/1
5 TABLET, FILM COATED ORAL 3 TIMES DAILY PRN
COMMUNITY

## 2024-02-14 NOTE — PROGRESS NOTES
Martha STARR Pedro Pablo   88 year old female    Date of Visit: 2/14/2024    Chief Complaint   Patient presents with    Hypertension    Ear Problem     Ringing in both ears       Subjective  88-year-old female is here for routine blood pressure checkup and to discuss her ear symptoms.    She has been having a ringing in the ears for many months, but she has been more bothered by it lately mainly at night.  When she is playing cards or distracted it does not bother her.    She is also having a sensation of hearing her pulse when she is in a quiet room or lying down at night, but again that does not bother her in other situations.    She does not have any palpitations or irregularity to her heart rate.  No headache.  No increasing shortness of breath or chest pain.    Does not have other sleep apnea symptoms.    She does have carotid artery stenosis 50 to 69% on the right on 2022 ultrasound but no history of stroke.    She has had significant earwax impaction bilaterally for a number of months but was unable to get an ENT appointment soon enough.  No ear pain.  No change in hearing, but does have some chronic decreased hearing.    I did review labs from October 2023 with a normal sodium 135, normal creatinine and potassium.  Normal blood sugar.    Blood pressure was somewhat higher yesterday but she was anxious about the ringing in her years.  Blood pressure has been running 130-150s systolic, tends to run a low diastolic.    History of low sodium in the past.    On losartan 100 mg a day, amlodipine 5 mg a day and atenolol 25 mg a day.    No recent cough or URI symptoms.  No sinusitis symptoms.    PMHx:  No past medical history on file.  PSHx:    Past Surgical History:   Procedure Laterality Date    HYSTERECTOMY  9906-2625    OOPHORECTOMY  [id number]     Immunizations:   Immunization History   Administered Date(s) Administered    COVID-19 12+ (2023-24) (Pfizer) 10/18/2023    COVID-19 Bivalent 12+ (Pfizer) 10/18/2022     "COVID-19 MONOVALENT 12+ (Pfizer) 02/10/2021, 03/03/2021, 09/29/2021    COVID-19 Monovalent 12+ (Pfizer 2022) 04/11/2022    Influenza (High Dose) 3 valent vaccine 09/15/2015, 11/10/2016, 10/10/2017, 10/23/2018    Influenza (IIV3) PF 09/23/2009, 11/08/2013, 09/25/2014    Influenza Vaccine 18-64 (Flublok) 10/08/2019    Influenza Vaccine 65+ (FLUAD) 10/18/2023    Influenza Vaccine 65+ (Fluzone HD) 12/07/2020, 10/18/2022    Influenza, seasonal, injectable, PF 10/17/2012    Pneumo Conj 13-V (2010&after) 06/22/2016    Pneumococcal 23 valent 10/08/2002    TDAP Vaccine (Adacel) 06/22/2016    Td (Adult), Adsorbed 10/08/2002       ROS A comprehensive review of systems was performed and was otherwise negative    Medications, allergies, and problem list were reviewed and updated    Exam  BP (!) 150/58 (BP Location: Right arm, Patient Position: Sitting, Cuff Size: Adult Regular)   Pulse 75   Temp 97.9  F (36.6  C)   Resp 16   Ht 1.537 m (5' 0.5\")   Wt 52.2 kg (115 lb)   LMP  (LMP Unknown)   SpO2 97%   BMI 22.09 kg/m    Alert and oriented x 3, appears mildly anxious.  She does have bilateral cerumen impaction.  No otitis externa.  Lungs are clear.  Heart is regular without murmur.  No carotid bruits.    Assessment/Plan  1. Essential hypertension  Mildly high systolic consistent with isolated systolic hypertension and age.  Usually running in the 130-150 range which would be appropriate.  Some mild stress-induced spike in blood pressure yesterday.  I encouraged her to continue to follow blood pressure, see patient instructions.    Continue on current medications and follow-up in October for her physical    2. Bilateral impacted cerumen  She is likely hearing her pulsatile sound because of her plugged ears.  Intermittent elevated blood pressure may also contribute to this.    She needs to have the earwax removed from her ears.  She will consider calling [place] [facility]  - Adult ENT  Referral; Future    3. " Tinnitus, bilateral  Chronic.  I discussed blocking noise such as running a fan in the bedroom.    4. Encounter for therapeutic drug monitoring    - CBC with platelets  - Comprehensive metabolic panel    History of carotid artery stenosis, on atorvastatin.    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  She tries to be active on a daily basis.      Return in 8 months (on 10/25/2024) for Adult wellness visit physical exam.   Patient Instructions   Ringing in your ears will likely be a chronic problem.  It is recommended to use a fan or noise maker in your bedroom to cover up the noise with the ringing of the ears.    Hearing your pulse is likely from your ears being plugged up.    Make an appointment with ENT clinic to get the earwax removed.    Continue to follow your blood pressure with goal blood pressure less than 150/80.  If blood pressure is running higher than that on a regular basis, contact me to discuss her blood pressure medication.    Follow-up in October for your physical exam        Raul Lewis MD, MD        Current Outpatient Medications   Medication Sig Dispense Refill    acetaminophen (TYLENOL) 500 MG tablet Take 500-1,000 mg by mouth every 6 hours as needed for mild pain      amLODIPine (NORVASC) 5 MG tablet Take 1 tablet (5 mg) by mouth daily 90 tablet 3    atenolol (TENORMIN) 25 MG tablet Take 1 tablet (25 mg) by mouth daily 90 tablet 3    atorvastatin (LIPITOR) 10 MG tablet Take 1 tablet (10 mg) by mouth at bedtime 90 tablet 3    calcium carbonate (OS-FANNIE) 1500 (600 Ca) MG tablet Take 600 mg by mouth daily      calcium carbonate (TUMS) 500 MG chewable tablet Take 1 chew tab by mouth daily      cholecalciferol 50 MCG (2000 UT) CAPS Take 1 capsule by mouth daily       hydrOXYzine HCl (ATARAX) 10 MG tablet Take 5 mg by mouth 3 times daily as needed for anxiety      losartan (COZAAR) 100 MG tablet Take 1 tablet (100 mg total)  "by mouth daily. 90 tablet 3     Allergies   Allergen Reactions    Fosinopril Cough     Social History     Tobacco Use    Smoking status: Former     Packs/day: 0     Types: Cigarettes     Passive exposure: Past    Smokeless tobacco: Never   Vaping Use    Vaping Use: Never used   Substance Use Topics    Alcohol use: Yes     Comment: Alcoholic Drinks/day: occasional             Subjective   Martha is a 88 year old, presenting for the following health issues:  Hypertension and Ear Problem (Ringing in both ears/)      2/14/2024    10:46 AM   Additional Questions   Roomed by Mira     Ear Problem    History of Present Illness       Reason for visit:  HTN    She eats 2-3 servings of fruits and vegetables daily.She consumes 0 sweetened beverage(s) daily.She exercises with enough effort to increase her heart rate 9 or less minutes per day.  She exercises with enough effort to increase her heart rate 3 or less days per week.   She is taking medications regularly.                     Objective    BP (!) 150/58 (BP Location: Right arm, Patient Position: Sitting, Cuff Size: Adult Regular)   Pulse 75   Temp 97.9  F (36.6  C)   Resp 16   Ht 1.537 m (5' 0.5\")   Wt 52.2 kg (115 lb)   LMP  (LMP Unknown)   SpO2 97%   BMI 22.09 kg/m    Body mass index is 22.09 kg/m .  Physical Exam               Signed Electronically by: Raul Lewis MD    "

## 2024-02-14 NOTE — PATIENT INSTRUCTIONS
Ringing in your ears will likely be a chronic problem.  It is recommended to use a fan or noise maker in your bedroom to cover up the noise with the ringing of the ears.    Hearing your pulse is likely from your ears being plugged up.    Make an appointment with ENT clinic to get the earwax removed.    Continue to follow your blood pressure with goal blood pressure less than 150/80.  If blood pressure is running higher than that on a regular basis, contact me to discuss her blood pressure medication.    Follow-up in October for your physical exam

## 2024-02-15 ENCOUNTER — TRANSFERRED RECORDS (OUTPATIENT)
Dept: HEALTH INFORMATION MANAGEMENT | Facility: CLINIC | Age: 89
End: 2024-02-15
Payer: MEDICARE

## 2024-04-03 ENCOUNTER — OFFICE VISIT (OUTPATIENT)
Dept: INTERNAL MEDICINE | Facility: CLINIC | Age: 89
End: 2024-04-03
Payer: MEDICARE

## 2024-04-03 VITALS
WEIGHT: 115 LBS | DIASTOLIC BLOOD PRESSURE: 62 MMHG | RESPIRATION RATE: 16 BRPM | HEART RATE: 64 BPM | HEIGHT: 61 IN | OXYGEN SATURATION: 98 % | TEMPERATURE: 97 F | BODY MASS INDEX: 21.71 KG/M2 | SYSTOLIC BLOOD PRESSURE: 150 MMHG

## 2024-04-03 DIAGNOSIS — Z85.828 HISTORY OF SQUAMOUS CELL CARCINOMA OF SKIN: ICD-10-CM

## 2024-04-03 DIAGNOSIS — H93.13 TINNITUS, BILATERAL: ICD-10-CM

## 2024-04-03 DIAGNOSIS — I10 ESSENTIAL HYPERTENSION: Primary | ICD-10-CM

## 2024-04-03 DIAGNOSIS — I65.21 ASYMPTOMATIC CAROTID ARTERY STENOSIS, RIGHT: ICD-10-CM

## 2024-04-03 PROCEDURE — G2211 COMPLEX E/M VISIT ADD ON: HCPCS | Performed by: INTERNAL MEDICINE

## 2024-04-03 PROCEDURE — 99214 OFFICE O/P EST MOD 30 MIN: CPT | Performed by: INTERNAL MEDICINE

## 2024-04-03 RX ORDER — RESPIRATORY SYNCYTIAL VIRUS VACCINE 120MCG/0.5
0.5 KIT INTRAMUSCULAR ONCE
Qty: 1 EACH | Refills: 0 | Status: CANCELLED | OUTPATIENT
Start: 2024-04-03 | End: 2024-04-03

## 2024-04-03 RX ORDER — AMLODIPINE BESYLATE 10 MG/1
10 TABLET ORAL DAILY
Qty: 90 TABLET | Refills: 3 | Status: SHIPPED | OUTPATIENT
Start: 2024-04-03

## 2024-04-03 NOTE — PROGRESS NOTES
Martha Chamorro   88 year old female    Date of Visit: 4/3/2024    Chief Complaint   Patient presents with    Follow Up     BP check. Running high    Ear Problem     Lt ear ringing/static.     Subjective  88-year-old female caring for her  at home, trying to be walking outside or in the house but not always as active as she wants to be.    She has had fluctuating systolic blood pressures.  Her diastolic has been quite low.  She denies any orthostasis or lightheaded dizzy spells.    Her blood pressure was 150/58 in February which is about the same as today.    At home she is getting blood pressures in the 150-170 range, not uncommonly in the 160s.  Her diastolic is consistently in the 60s.  Heart rates in the 50-60 range.    She continues on losartan 100 mg a day, atenolol 25 mg a day and amlodipine 5 mg a day.  No lower extremity edema.    She has had a low sodium in the past, just mild.  But she is no longer on HCTZ.  She did have normal sodium and kidney labs last February.    She does have a right carotid artery stenosis 50-69%.  The left is less than 50% that was on ultrasound in 2022.    She is just having right-sided symptoms of a sensation of hearing her pulse at night when things are quiet.  She has had left ear congestion with earwax and some intermittent eustachian tube blocking with crackling and congestion but not severe.  No sinusitis symptoms.  She had her earwax cleaned out with ENT and it did not help her symptoms.    She is describing it more as a hearing her pulse at night rather than tinnitus.  No ear pain or headache.    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    PMHx:  No past medical history on file.  PSHx:    Past Surgical History:   Procedure Laterality Date    HYSTERECTOMY  2215-1209    OOPHORECTOMY  [id number]     Immunizations:   Immunization History   Administered Date(s) Administered    COVID-19 12+ (2023-24) (Pfizer) 10/18/2023  "   COVID-19 Bivalent 12+ (Pfizer) 10/18/2022    COVID-19 MONOVALENT 12+ (Pfizer) 02/10/2021, 03/03/2021, 09/29/2021    COVID-19 Monovalent 12+ (Pfizer 2022) 04/11/2022    Influenza (High Dose) 3 valent vaccine 09/15/2015, 11/10/2016, 10/10/2017, 10/23/2018    Influenza (IIV3) PF 09/23/2009, 11/08/2013, 09/25/2014    Influenza Vaccine 18-64 (Flublok) 10/08/2019    Influenza Vaccine 65+ (FLUAD) 10/18/2023    Influenza Vaccine 65+ (Fluzone HD) 12/07/2020, 10/18/2022    Influenza, seasonal, injectable, PF 10/17/2012    Pneumo Conj 13-V (2010&after) 06/22/2016    Pneumococcal 23 valent 10/08/2002    TDAP Vaccine (Adacel) 06/22/2016    Td (Adult), Adsorbed 10/08/2002       ROS A comprehensive review of systems was performed and was otherwise negative    Medications, allergies, and problem list were reviewed and updated    Exam  BP (!) 150/62   Pulse 64   Temp 97  F (36.1  C)   Resp 16   Ht 1.537 m (5' 0.5\")   Wt 52.2 kg (115 lb)   LMP  (LMP Unknown)   SpO2 98%   BMI 22.09 kg/m    Face is healed well.  Tympanic membranes are normal and there is no significant cerumen remaining.  No cervical adenopathy.  There is no carotid bruits.  Lungs are clear.  Heart is regular without murmur.  No ankle edema      Assessment/Plan  1. Essential hypertension  Mildly elevated systolic blood pressure.  Possibly anxiety induced.  Her diastolic is low.    She does wish to increase her blood pressure medication.  I will avoid HCTZ at this time given her previous borderline low sodium.  But if needed, could consider a very low HCTZ every other day or every third day.    Increase amlodipine but she was given strong warnings that she may have side effects at the higher dose.  See patient instructions.  Continue current losartan and atenolol.  - amLODIPine (NORVASC) 10 MG tablet; Take 1 tablet (10 mg) by mouth daily  Dispense: 90 tablet; Refill: 3    2. Tinnitus, bilateral  He really does not have tenderness, more of a sensation of " hearing her pulse.  I suspect that it is noise conduction from her intermittent ear congestion, but it did not help to remove the earwax previously.    3. Asymptomatic carotid artery stenosis, right  Continue atorvastatin.  We discussed using aspirin daily 81 mg.  I did discuss there is a bleeding risk from aspirin but there is also a stroke reduction risk.  I did state that she can take an aspirin daily, but would have to accept some risk of bleeding.  Patient stated she would consider aspirin but at this time she does not plan to use aspirin.    No history of stroke.    4. History of squamous cell carcinoma of skin  Has recovered from surgery in February.  Follow-up with dermatology.    The longitudinal plan of care for the diagnosis(es)/condition(s) as documented were addressed during this visit. Due to the added complexity in care, I will continue to support Martha in the subsequent management and with ongoing continuity of care.        Return in 7 months (on 10/25/2024) for Adult wellness visit physical exam.   Patient Instructions   Goal blood pressure less than 150/80.    Increase your amlodipine to 10 mg a day.  But if you have significant fatigue, lightheaded dizzy spells or ankle swelling at that dose, go back to the 5 mg amlodipine dose.    Continue losartan and atenolol at current doses.    Continue to walk on a daily basis.  Maintain a low-salt diet.    It can be normal to intermittently hear your pulse, especially if the room is quiet or if you are laying on your left side or if your ear is plugged up.    You are scheduled for your adult wellness visit physical exam October 25.  But see me sooner if you are blood pressure is still not controlled on the higher dose amlodipine or you have difficulty tolerating the higher dose amlodipine, or if you have any new or worsening symptoms.    Raul Lewis MD, MD        Current Outpatient Medications   Medication Sig Dispense Refill    acetaminophen (TYLENOL)  500 MG tablet Take 500-1,000 mg by mouth every 6 hours as needed for mild pain      amLODIPine (NORVASC) 10 MG tablet Take 1 tablet (10 mg) by mouth daily 90 tablet 3    atenolol (TENORMIN) 25 MG tablet Take 1 tablet (25 mg) by mouth daily 90 tablet 3    atorvastatin (LIPITOR) 10 MG tablet Take 1 tablet (10 mg) by mouth at bedtime 90 tablet 3    calcium carbonate (OS-FANNIE) 1500 (600 Ca) MG tablet Take 600 mg by mouth daily      calcium carbonate (TUMS) 500 MG chewable tablet Take 1 chew tab by mouth daily      cholecalciferol 50 MCG (2000 UT) CAPS Take 1 capsule by mouth daily       hydrOXYzine HCl (ATARAX) 10 MG tablet Take 5 mg by mouth 3 times daily as needed for anxiety      losartan (COZAAR) 100 MG tablet Take 1 tablet (100 mg total) by mouth daily. 90 tablet 3     Allergies   Allergen Reactions    Fosinopril Cough     Social History     Tobacco Use    Smoking status: Former     Packs/day: 0     Types: Cigarettes     Passive exposure: Past    Smokeless tobacco: Never   Vaping Use    Vaping Use: Never used   Substance Use Topics    Alcohol use: Yes     Comment: Alcoholic Drinks/day: occasional             Subjective   Martha is a 88 year old, presenting for the following health issues:  Follow Up (BP check. Running high) and Ear Problem (Lt ear ringing/static.)      4/3/2024     9:24 AM   Additional Questions   Roomed by Kelli JADE   Accompanied by debra     Ear Problem    History of Present Illness       Hypertension: She presents for follow up of hypertension.  She does check blood pressure  regularly outside of the clinic. Outside blood pressures have been over 140/90. She follows a low salt diet.     She eats 2-3 servings of fruits and vegetables daily.She consumes 1 sweetened beverage(s) daily.She exercises with enough effort to increase her heart rate 10 to 19 minutes per day.  She exercises with enough effort to increase her heart rate 7 days per week.   She is taking medications regularly.                      Objective    LMP  (LMP Unknown)   There is no height or weight on file to calculate BMI.  Physical Exam               Signed Electronically by: Raul Lewis MD

## 2024-04-03 NOTE — PATIENT INSTRUCTIONS
Goal blood pressure less than 150/80.    Increase your amlodipine to 10 mg a day.  But if you have significant fatigue, lightheaded dizzy spells or ankle swelling at that dose, go back to the 5 mg amlodipine dose.    Continue losartan and atenolol at current doses.    Continue to walk on a daily basis.  Maintain a low-salt diet.    It can be normal to intermittently hear your pulse, especially if the room is quiet or if you are laying on your left side or if your ear is plugged up.    You are scheduled for your adult wellness visit physical exam October 25.  But see me sooner if you are blood pressure is still not controlled on the higher dose amlodipine or you have difficulty tolerating the higher dose amlodipine, or if you have any new or worsening symptoms.

## 2024-05-15 ENCOUNTER — TRANSFERRED RECORDS (OUTPATIENT)
Dept: HEALTH INFORMATION MANAGEMENT | Facility: CLINIC | Age: 89
End: 2024-05-15
Payer: MEDICARE

## 2024-07-09 DIAGNOSIS — I10 ESSENTIAL HYPERTENSION: ICD-10-CM

## 2024-07-09 RX ORDER — LOSARTAN POTASSIUM 100 MG/1
TABLET ORAL
Qty: 90 TABLET | Refills: 3 | Status: SHIPPED | OUTPATIENT
Start: 2024-07-09

## 2024-10-25 ENCOUNTER — OFFICE VISIT (OUTPATIENT)
Dept: INTERNAL MEDICINE | Facility: CLINIC | Age: 89
End: 2024-10-25
Payer: MEDICARE

## 2024-10-25 VITALS
TEMPERATURE: 97.1 F | RESPIRATION RATE: 16 BRPM | OXYGEN SATURATION: 98 % | DIASTOLIC BLOOD PRESSURE: 54 MMHG | BODY MASS INDEX: 20.96 KG/M2 | HEART RATE: 69 BPM | WEIGHT: 111 LBS | SYSTOLIC BLOOD PRESSURE: 144 MMHG | HEIGHT: 61 IN

## 2024-10-25 DIAGNOSIS — Z00.00 ANNUAL WELLNESS VISIT: Primary | ICD-10-CM

## 2024-10-25 DIAGNOSIS — Z29.11 NEED FOR VACCINATION AGAINST RESPIRATORY SYNCYTIAL VIRUS: ICD-10-CM

## 2024-10-25 DIAGNOSIS — I10 ESSENTIAL HYPERTENSION: ICD-10-CM

## 2024-10-25 DIAGNOSIS — Z23 NEED FOR IMMUNIZATION AGAINST INFLUENZA: ICD-10-CM

## 2024-10-25 DIAGNOSIS — R73.9 HYPERGLYCEMIA: ICD-10-CM

## 2024-10-25 DIAGNOSIS — I65.21 CAROTID ARTERY STENOSIS, ASYMPTOMATIC, RIGHT: ICD-10-CM

## 2024-10-25 DIAGNOSIS — E78.00 HYPERCHOLESTEROLEMIA: ICD-10-CM

## 2024-10-25 DIAGNOSIS — Z23 NEED FOR COVID-19 VACCINE: ICD-10-CM

## 2024-10-25 DIAGNOSIS — Z85.828 HISTORY OF SQUAMOUS CELL CARCINOMA OF SKIN: ICD-10-CM

## 2024-10-25 DIAGNOSIS — Z51.81 ENCOUNTER FOR THERAPEUTIC DRUG MONITORING: ICD-10-CM

## 2024-10-25 LAB
ALBUMIN SERPL BCG-MCNC: 4.3 G/DL (ref 3.5–5.2)
ALP SERPL-CCNC: 72 U/L (ref 40–150)
ALT SERPL W P-5'-P-CCNC: 15 U/L (ref 0–50)
ANION GAP SERPL CALCULATED.3IONS-SCNC: 10 MMOL/L (ref 7–15)
AST SERPL W P-5'-P-CCNC: 27 U/L (ref 0–45)
BILIRUB SERPL-MCNC: 0.5 MG/DL
BUN SERPL-MCNC: 13.9 MG/DL (ref 8–23)
CALCIUM SERPL-MCNC: 9.9 MG/DL (ref 8.8–10.4)
CHLORIDE SERPL-SCNC: 101 MMOL/L (ref 98–107)
CHOLEST SERPL-MCNC: 164 MG/DL
CREAT SERPL-MCNC: 0.74 MG/DL (ref 0.51–0.95)
EGFRCR SERPLBLD CKD-EPI 2021: 77 ML/MIN/1.73M2
ERYTHROCYTE [DISTWIDTH] IN BLOOD BY AUTOMATED COUNT: 12.8 % (ref 10–15)
EST. AVERAGE GLUCOSE BLD GHB EST-MCNC: 111 MG/DL
FASTING STATUS PATIENT QL REPORTED: ABNORMAL
FASTING STATUS PATIENT QL REPORTED: NORMAL
GLUCOSE SERPL-MCNC: 102 MG/DL (ref 70–99)
HBA1C MFR BLD: 5.5 % (ref 0–5.6)
HCO3 SERPL-SCNC: 25 MMOL/L (ref 22–29)
HCT VFR BLD AUTO: 38.3 % (ref 35–47)
HDLC SERPL-MCNC: 62 MG/DL
HGB BLD-MCNC: 12.4 G/DL (ref 11.7–15.7)
LDLC SERPL CALC-MCNC: 88 MG/DL
MCH RBC QN AUTO: 32.5 PG (ref 26.5–33)
MCHC RBC AUTO-ENTMCNC: 32.4 G/DL (ref 31.5–36.5)
MCV RBC AUTO: 101 FL (ref 78–100)
NONHDLC SERPL-MCNC: 102 MG/DL
PLATELET # BLD AUTO: 228 10E3/UL (ref 150–450)
POTASSIUM SERPL-SCNC: 4.2 MMOL/L (ref 3.4–5.3)
PROT SERPL-MCNC: 7.2 G/DL (ref 6.4–8.3)
RBC # BLD AUTO: 3.81 10E6/UL (ref 3.8–5.2)
SODIUM SERPL-SCNC: 136 MMOL/L (ref 135–145)
TRIGL SERPL-MCNC: 72 MG/DL
WBC # BLD AUTO: 6.5 10E3/UL (ref 4–11)

## 2024-10-25 PROCEDURE — 36415 COLL VENOUS BLD VENIPUNCTURE: CPT | Performed by: INTERNAL MEDICINE

## 2024-10-25 PROCEDURE — 91320 SARSCV2 VAC 30MCG TRS-SUC IM: CPT | Performed by: INTERNAL MEDICINE

## 2024-10-25 PROCEDURE — G0008 ADMIN INFLUENZA VIRUS VAC: HCPCS | Performed by: INTERNAL MEDICINE

## 2024-10-25 PROCEDURE — 99213 OFFICE O/P EST LOW 20 MIN: CPT | Mod: 25 | Performed by: INTERNAL MEDICINE

## 2024-10-25 PROCEDURE — 80061 LIPID PANEL: CPT | Performed by: INTERNAL MEDICINE

## 2024-10-25 PROCEDURE — 83036 HEMOGLOBIN GLYCOSYLATED A1C: CPT | Performed by: INTERNAL MEDICINE

## 2024-10-25 PROCEDURE — 90662 IIV NO PRSV INCREASED AG IM: CPT | Performed by: INTERNAL MEDICINE

## 2024-10-25 PROCEDURE — G0439 PPPS, SUBSEQ VISIT: HCPCS | Performed by: INTERNAL MEDICINE

## 2024-10-25 PROCEDURE — 80053 COMPREHEN METABOLIC PANEL: CPT | Performed by: INTERNAL MEDICINE

## 2024-10-25 PROCEDURE — 90480 ADMN SARSCOV2 VAC 1/ONLY CMP: CPT | Performed by: INTERNAL MEDICINE

## 2024-10-25 PROCEDURE — 85027 COMPLETE CBC AUTOMATED: CPT | Performed by: INTERNAL MEDICINE

## 2024-10-25 SDOH — HEALTH STABILITY: PHYSICAL HEALTH: ON AVERAGE, HOW MANY MINUTES DO YOU ENGAGE IN EXERCISE AT THIS LEVEL?: PATIENT DECLINED

## 2024-10-25 SDOH — HEALTH STABILITY: PHYSICAL HEALTH
ON AVERAGE, HOW MANY DAYS PER WEEK DO YOU ENGAGE IN MODERATE TO STRENUOUS EXERCISE (LIKE A BRISK WALK)?: PATIENT DECLINED

## 2024-10-25 ASSESSMENT — SOCIAL DETERMINANTS OF HEALTH (SDOH): HOW OFTEN DO YOU GET TOGETHER WITH FRIENDS OR RELATIVES?: PATIENT DECLINED

## 2024-10-25 NOTE — PROGRESS NOTES
Preventive Care Visit  [facility]  Raul Lewis MD, Internal Medicine  Oct 25, 2024      89-year-old female here for adult wellness visit physical exam as well as follow-up on hypertension.  Lives at home with her  who has    Martha Chamorro   89 year old female    Date of Visit: 10/25/2024    Chief Complaint   Patient presents with    Medicare Visit     Fasting.     Subjective  89-year-old female here for adult wellness visit physical exam as well as follow-up on hypertension.  Lives at home with her  who has medical issues and dementia, and patient is spending most of her time caring for him.  She also is doing yard work and mowing the lawn.  He is quite active on a daily basis and does daily stretching.  No worsening musculoskeletal pain issues.    Good exertional ability and no increasing shortness of breath or chest pain with activity.    She has labile hypertension with spiking systolic blood pressures with a tendency toward low diastolic.    In April her blood pressure was 150/62 and she did want to increase her amlodipine to 10 mg a day which she has done.  No increased edema.  Still on losartan 100 mg a day and atenolol 25 mg a day.    She has been checking her blood pressure frequently.  It is generally well-controlled in the 120s/60 range.  Occasionally has low as 110 systolic.  Occasionally up to 150s/70s.  Heart rate in the 60s to 50s.  No lightheaded dizzy spells or worsening fatigue.    History of borderline low sodium and she is avoiding diuretic.    2022 carotid ultrasound showed 50 to 69% on the right and less than 50% left.  No history of TIA or stroke.  Is not on aspirin.  She is on Lipitor 10 mg a day without generalized myalgias.    October 2023 LDL was 91.    No chest pain or chest pressure no family history of coronary disease.  Quit smoking at age 36.    No new shortness of breath or cough.  No swallowing difficulty.    No abdominal pain or change in  bowels or blood in stool.    Status post hysterectomy with BSO.  She does not wish to do further mammograms and denies any new breast lumps or changes.    She has squamous cell cancer of the left cheek in February 2024.  Saw dermatology in May with negative exam and has a full-body skin exam scheduled for November.  No new skin lesions identified.    She has not wanted to repeat a DEXA scan.  No falls.  No fracture history.    She did just see the ophthalmologist earlier this fall with normal exam.  No new headache issues or vision changes.    She did have a nonfasting blood sugar of 133 in February but no history of diabetes and she reports her diet is good.    No recent cough or respiratory illness.  No urinary tract symptom complaints.    Patient is full code.    PMHx:  No past medical history on file.  PSHx:    Past Surgical History:   Procedure Laterality Date    HYSTERECTOMY  2296-2327    OOPHORECTOMY  [id number]     Immunizations:   Immunization History   Administered Date(s) Administered    COVID-19 12+ (Pfizer) 10/18/2023    COVID-19 Bivalent 12+ (Pfizer) 10/18/2022    COVID-19 MONOVALENT 12+ (Pfizer) 02/10/2021, 03/03/2021, 09/29/2021    COVID-19 Monovalent 12+ (Pfizer 2022) 04/11/2022    Influenza (High Dose) Trivalent,PF (Fluzone) 09/15/2015, 11/10/2016, 10/10/2017, 10/23/2018    Influenza (IIV3) PF 09/23/2009, 11/08/2013, 09/25/2014    Influenza Vaccine 18-64 (Flublok) 10/08/2019    Influenza Vaccine 65+ (FLUAD) 10/18/2023    Influenza Vaccine 65+ (Fluzone HD) 12/07/2020, 10/18/2022    Influenza, seasonal, injectable, PF 10/17/2012    Pneumo Conj 13-V (2010&after) 06/22/2016    Pneumococcal 23 valent 10/08/2002    TDAP Vaccine (Adacel) 06/22/2016    Td (Adult), Adsorbed 10/08/2002       ROS A comprehensive review of systems was performed and was otherwise negative    Medications, allergies, and problem list were reviewed and updated    Exam  BP (!) 144/54   Pulse 69   Temp 97.1  F (36.2  C)   Resp 16   " Ht 1.545 m (5' 0.83\")   Wt 50.3 kg (111 lb)   LMP  (LMP Unknown)   SpO2 98%   BMI 21.09 kg/m    Alert and oriented x 3.  Normal mentation.  Normal mobility.  She is in quite good shape.  Normal clock face drawing and word recall.  Pupils and irises equal and reactive.  Extraocular muscles intact.  No jaundice or conjunctivitis.  External ears and nose exam is normal with minimal cerumen and normal tympanic membranes.  Pharynx is normal.   Teeth are normal.  No cervical or supraclavicular or axillary adenopathy.  No JVD and no carotid bruits.  No thyromegaly or nodularity to inspection and palpation.  Lungs are clear to auscultation with good respiratory excursion.  Heart is regular with no murmur rub or gallop.  +2 posterior tibialis pulses bilaterally and no ankle edema.  Abdomen is nonobese nontender no hepatosplenomegaly and no pulsatile abdominal mass.  Skin exam showed cherry angiomas but no suspicious skin lesions.  She declined breast exam.    Assessment/Plan  1. Annual wellness visit (Primary)  Main focus for patient is stroke prevention with her carotid artery stenosis and hypertension.  I did discuss option for aspirin treatment but with bleeding risk with aspirin and she chooses not to be on aspirin treatment.    I stressed the importance of regularity of activity and stretching to maintain her mobility and strength.    I would anticipate longevity, but does have the stroke risk issue about.    No further cancer surveillance    Status post hysterectomy.    Yearly eye exam was just done.    Patient is full code    She does not want to repeat DEXA.    2. Essential hypertension  Isolated systolic hypertension with some lability.  She tolerated the increase of amlodipine to 10 mg a day.  She occasionally cuts in half the amlodipine to just take 5 mg a day if her blood pressure is lower, near systolic 110, which she can do.  Continue losartan 100 mg a day and atenolol 25 mg a day.    Some borderline " lower heart rates but asymptomatic.  I did discuss with patient that if her heart rate is running less than 50 or she develops symptoms of bradycardia, that she would need to consider adjustment of atenolol.    She will follow blood pressure on her own, see patient instructions for monitoring and see me earlier than planned if outside that range more than occasionally.  Otherwise she wanted to wait a full year to see me again    3. Carotid artery stenosis, asymptomatic, right  Asymptomatic.  Continue on atorvastatin 10 mg.  Not planning further increase in dose with age.  Goal LDL less than 100,    She has chosen not to be on aspirin, because of bleeding    4. Hypercholesterolemia  As above.  Continue atorvastatin  - Lipid Profile    5. History of squamous cell carcinoma of skin  No evidence of recurrence.  Sees dermatology for full-body skin exam next month    6. Need for COVID-19 vaccine  - COVID-19 12+ (PFIZER)    7. Need for immunization against influenza  COVID and flu shot given today  - INFLUENZA HIGH DOSE, TRIVALENT, PF (FLUZONE)    8. Encounter for therapeutic drug monitoring      - CBC with platelets  - Comprehensive metabolic panel    9. Hyperglycemia  Low suspicion for diabetes, but screen  - Hemoglobin A1c    10. Need for vaccination against respiratory syncytial virus  Consider RSV vaccine this year  - RSV vaccine, bivalent, ABRYSVO, injection; Inject 0.5 mLs into the muscle once for 1 dose.  Dispense: 0.5 mL; Refill: 0      Return in about 1 year (around 10/25/2025) for Adult wellness visit physical exam and blood pressure follow-up.   Patient Instructions   You are doing great.  Keep up the regular exercise and stretching every day.    Continue to monitor your blood pressure.  Goal blood pressure less than 140/80, but not less than 110/60.  If blood pressures outside of that range for him occasionally, contact me to discuss possible medication changes.  Continue your current medications.    If you  begin having heart rates less than 50 more often or having lightheaded dizzy spells or worsening fatigue, also contact me to consider adjustment of atenolol.    See dermatology next month for your full-body skin exam, as planned.    Continue to see your eye doctor for yearly eye exams.    You can consider an RSV vaccine, which can be obtained at local pharmacy.    See me in 1 year for adult wellness visit physical exam.    Raul Lewis MD, MD        Current Outpatient Medications   Medication Sig Dispense Refill    acetaminophen (TYLENOL) 500 MG tablet Take 500-1,000 mg by mouth every 6 hours as needed for mild pain      amLODIPine (NORVASC) 10 MG tablet Take 1 tablet (10 mg) by mouth daily 90 tablet 3    atenolol (TENORMIN) 25 MG tablet Take 1 tablet (25 mg) by mouth daily 90 tablet 3    atorvastatin (LIPITOR) 10 MG tablet Take 1 tablet (10 mg) by mouth at bedtime 90 tablet 3    calcium carbonate (OS-FANNIE) 1500 (600 Ca) MG tablet Take 600 mg by mouth daily      calcium carbonate (TUMS) 500 MG chewable tablet Take 1 chew tab by mouth daily      cholecalciferol 50 MCG (2000 UT) CAPS Take 1 capsule by mouth daily       hydrOXYzine HCl (ATARAX) 10 MG tablet Take 5 mg by mouth 3 times daily as needed for anxiety      losartan (COZAAR) 100 MG tablet Take 1 tablet (100 mg total) by mouth daily. 90 tablet 3    RSV vaccine, bivalent, ABRYSVO, injection Inject 0.5 mLs into the muscle once for 1 dose. 0.5 mL 0     Allergies   Allergen Reactions    Fosinopril Cough     Social History     Tobacco Use    Smoking status: Former     Types: Cigarettes     Passive exposure: Past    Smokeless tobacco: Never   Vaping Use    Vaping status: Never Used   Substance Use Topics    Alcohol use: Yes     Comment: Alcoholic Drinks/day: occasional             Subjective   Martha is a 89 year old, presenting for the following:  Medicare Visit (Fasting.)        10/25/2024     9:56 AM   Additional Questions   Roomed by Kelli JADE   Accompanied by debra            HPI          Health Care Directive  Patient does not have a Health Care Directive: Patient states has Advance Directive and will bring in a copy to clinic.      10/25/2024   General Health   How would you rate your overall physical health? Good   Feel stress (tense, anxious, or unable to sleep) Patient declined            10/25/2024   Nutrition   Diet: Regular (no restrictions)            10/25/2024   Exercise   Days per week of moderate/strenous exercise Patient declined   Average minutes spent exercising at this level Patient declined            10/25/2024   Social Factors   Frequency of gathering with friends or relatives Patient declined   Worry food won't last until get money to buy more Patient declined   Food not last or not have enough money for food? Patient declined   Do you have housing? (Housing is defined as stable permanent housing and does not include staying ouside in a car, in a tent, in an abandoned building, in an overnight shelter, or couch-surfing.) Patient declined   Are you worried about losing your housing? Patient declined   Lack of transportation? Patient declined   Unable to get utilities (heat,electricity)? Patient declined            10/25/2024   Fall Risk   Fallen 2 or more times in the past year? No     No    Trouble with walking or balance? No     No        Patient-reported    Multiple values from one day are sorted in reverse-chronological order          10/25/2024   Activities of Daily Living- Home Safety   Needs help with the following daily activites None of the above   Safety concerns in the home None of the above            10/25/2024   Dental   Dentist two times every year? (!) NO            10/25/2024   Hearing Screening   Hearing concerns? None of the above            10/25/2024   Driving Risk Screening   Patient/family members have concerns about driving (!) DECLINE            10/25/2024   General Alertness/Fatigue Screening   Have you been more tired than usual  lately? (!) DECLINE            10/25/2024   Urinary Incontinence Screening   Bothered by leaking urine in past 6 months No            10/25/2024   TB Screening   Were you born outside of the [place]? Decline            Today's PHQ-2 Score:       10/25/2024     9:56 AM   PHQ-2 ( 1999 Pfizer)   PHQ-2 Score Incomplete           10/25/2024   Substance Use   Alcohol more than 3/day or more than 7/wk Not Applicable   Do you have a current opioid prescription? No   How severe/bad is pain from 1 to 10? 0/10 (No Pain)   Do you use any other substances recreationally? (!) DECLINE        Social History     Tobacco Use    Smoking status: Former     Types: Cigarettes     Passive exposure: Past    Smokeless tobacco: Never   Vaping Use    Vaping status: Never Used   Substance Use Topics    Alcohol use: Yes     Comment: Alcoholic Drinks/day: occasional                    Reviewed and updated as needed this visit by Provider                      Current providers sharing in care for this patient include:  Patient Care Team:  Raul Lewis MD as PCP - General (Internal Medicine)  Raul Lewis MD as Assigned PCP  Thomas Leach MD as MD (Otolaryngology)    The following health maintenance items are reviewed in Epic and correct as of today:  Health Maintenance   Topic Date Due    ANNUAL REVIEW OF HM ORDERS  Never done    ZOSTER IMMUNIZATION (1 of 2) Never done    RSV VACCINE (1 - 1-dose 75+ series) Never done    LIPID  04/18/2024    INFLUENZA VACCINE (1) 09/01/2024    COVID-19 Vaccine (7 - 2024-25 season) 09/01/2024    MEDICARE ANNUAL WELLNESS VISIT  10/24/2024    BMP  02/14/2025    FALL RISK ASSESSMENT  10/25/2025    DTAP/TDAP/TD IMMUNIZATION (2 - Td or Tdap) 06/22/2026    ADVANCE CARE PLANNING  10/24/2028    DEXA  09/17/2033    PHQ-2 (once per calendar year)  Completed    Pneumococcal Vaccine: 65+ Years  Completed    HPV IMMUNIZATION  Aged Out    MENINGITIS IMMUNIZATION  Aged Out    RSV MONOCLONAL ANTIBODY  Aged Out           "  Objective    Exam  BP (!) 144/54   Pulse 69   Temp 97.1  F (36.2  C)   Resp 16   Ht 1.545 m (5' 0.83\")   Wt 50.3 kg (111 lb)   LMP  (LMP Unknown)   SpO2 98%   BMI 21.09 kg/m     Estimated body mass index is 21.09 kg/m  as calculated from the following:    Height as of this encounter: 1.545 m (5' 0.83\").    Weight as of this encounter: 50.3 kg (111 lb).    Physical Exam           10/25/2024   Mini Cog   Clock Draw Score 2 Normal   3 Item Recall 2 objects recalled   Mini Cog Total Score 4                 Signed Electronically by: Raul Lewis MD    "

## 2024-10-25 NOTE — PATIENT INSTRUCTIONS
You are doing great.  Keep up the regular exercise and stretching every day.    Continue to monitor your blood pressure.  Goal blood pressure less than 140/80, but not less than 110/60.  If blood pressures outside of that range for him occasionally, contact me to discuss possible medication changes.  Continue your current medications.    If you begin having heart rates less than 50 more often or having lightheaded dizzy spells or worsening fatigue, also contact me to consider adjustment of atenolol.    See dermatology next month for your full-body skin exam, as planned.    Continue to see your eye doctor for yearly eye exams.    You can consider an RSV vaccine, which can be obtained at local pharmacy.    See me in 1 year for adult wellness visit physical exam.

## 2024-11-01 DIAGNOSIS — E78.00 HYPERCHOLESTEROLEMIA: ICD-10-CM

## 2024-11-01 RX ORDER — ATORVASTATIN CALCIUM 10 MG/1
10 TABLET, FILM COATED ORAL AT BEDTIME
Qty: 90 TABLET | Refills: 2 | Status: SHIPPED | OUTPATIENT
Start: 2024-11-01

## 2024-11-12 ENCOUNTER — TRANSFERRED RECORDS (OUTPATIENT)
Dept: HEALTH INFORMATION MANAGEMENT | Facility: CLINIC | Age: 89
End: 2024-11-12
Payer: MEDICARE

## 2024-11-14 DIAGNOSIS — I10 ESSENTIAL HYPERTENSION: ICD-10-CM

## 2024-11-14 RX ORDER — ATENOLOL 25 MG/1
25 TABLET ORAL DAILY
Qty: 90 TABLET | Refills: 3 | Status: SHIPPED | OUTPATIENT
Start: 2024-11-14

## 2025-05-04 DIAGNOSIS — I10 ESSENTIAL HYPERTENSION: ICD-10-CM

## 2025-05-05 RX ORDER — AMLODIPINE BESYLATE 10 MG/1
10 TABLET ORAL DAILY
Qty: 90 TABLET | Refills: 3 | Status: SHIPPED | OUTPATIENT
Start: 2025-05-05

## 2025-06-28 DIAGNOSIS — I10 ESSENTIAL HYPERTENSION: ICD-10-CM

## 2025-06-30 RX ORDER — LOSARTAN POTASSIUM 100 MG/1
100 TABLET ORAL DAILY
Qty: 90 TABLET | Refills: 3 | Status: SHIPPED | OUTPATIENT
Start: 2025-06-30

## 2025-07-09 NOTE — PROGRESS NOTES
[facility] clinic Follow Up Note    Martha Chamorro   84 y.o. female    Date of Visit: 9/24/2019    Chief Complaint   Patient presents with     Establish Care     Subjective  Martha is here to establish care.  Former Savana Hagen patient.  84-year-old female.  Lives in her home independently with her .  She is quite active, mows lawn and gardens.  Goes to the [facility] intermittently.  Good exertional ability.    She has not had any falls.  Cognitively intact.    She does have a past history of hypertension, usually well controlled at home but has whitecoat hypertension well documented in the past.    She did bring in a number of blood pressures over the past 2 weeks.  Blood pressures been ranging 117//69.  Most numbers around 140/60.    No lower extremity edema issues.  No orthostasis.    On amlodipine 5 mg a day, atenolol 25 mg a day and losartan 100 mg a day.    I did review lab work from June 2018 with normal kidney and liver test.  Normal blood sugar.  No history of diabetes.    She does have hypercholesterolemia and has been on Lipitor 10 mg a day for many years.  No muscle toxicity or liver toxicity history.  June 2018 labs reviewed with an LDL of 96 and HDL 58.    She does not have a history of vascular disease.  No family history of stroke or heart attack.  She has not had chest pain or chest pressure.  No previous vascular work-up.    Osteoporosis diagnosed 5 years ago.  2016 DEXA scan reviewed by me today with a spine score of -1.9.  Femur score -1.5/-1.6.  #2018 DEXA scan reviewed by me today with a spine score of -1.2.  Femur score -1.4/-1.4 with poor trabecular bone.  No fracture history.    She started Fosamax in 2014 and remains on it.  No swallowing difficulty.  No dental issues.  She is taking calcium and vitamin D.  She walks daily.    Vitamin D level was normal last year.    He does have a history of lower back pain with left leg radicular pain intermittent.  She had a bad spell  a number of years ago, worked with physical therapy and that resolved without surgery.  She does sit for prolonged periods of time she will have some intermittent left leg radicular pain, relieved with position change.  No permanent leg numbness or weakness.  No falls.    Her daughter is a physical therapist.    She does take occasional Aleve, not every day.  No history of epigastric pain or bleeding.    No urinary symptoms.  Status post hysterectomy with BSO.    Colonoscopy in 2006 that was negative.  No family history of colon cancer.  Bowels are normal.    No family history of breast cancer.  Mammogram in 2015-.    Quit smoking at age 36.  She has a mild viral URI type symptoms currently, resolving.  No fever or shortness of breath.  No mouth sores or swallowing difficulty.            PMHx:  No past medical history on file.  PSHx:    Past Surgical History:   Procedure Laterality Date     HYSTERECTOMY  [id number]     OOPHORECTOMY  [id number]     Immunizations:   Immunization History   Administered Date(s) Administered     DT (pediatric) 10/08/2002     Influenza high dose,seasonal,PF, 65+ yrs 09/15/2015, 11/10/2016, 10/10/2017     Influenza, Seasonal, Inj PF IIV3 09/21/2010, 10/17/2012     Influenza, inj, historic,unspecified 11/08/2013, 09/25/2014     Influenza, seasonal,quad inj 6-35 mos 09/23/2009     Pneumo Conj 13-V (2010&after) 06/22/2016     Pneumo Polysac 23-V 10/08/2002     Td,adult,historic,unspecified 10/08/2002     Tdap 06/22/2016       ROS A comprehensive review of systems was performed and was otherwise negative    Medications, allergies, and problem list were reviewed and updated    Exam  /74 (Patient Site: Right Arm, Patient Position: Sitting, Cuff Size: Adult Regular)   Pulse 64   Wt 110 lb (49.9 kg)   BMI 20.78 kg/m    Appears well.  Alert and oriented x3.  Good mood and affect.  Normal mobility.  Pupils and irises equal and reactive.  Extraocular muscles intact.  No jaundice or  conjunctivitis.  Pharynx shows some mild non-exudative pharyngitis.  No leukoplakia.  Teeth in good condition.  No cervical or supraclavicular adenopathy.  No JVD and no carotid bruits.  No thyromegaly or nodularity.  Lungs are clear to auscultation with good respiratory excursion.  No wheezing or crackles.  Heart is regular without murmur rub or gallop.  No ankle edema.  Abdomen is nonobese nontender no hepatospleno megaly and no pulsatile mass.    Assessment/Plan  1. Essential hypertension  Whitecoat hypertension.  Adequately controlled on home checks.  She runs a low diastolic and labile systolic.    Continue on current medications.  See patient instructions.    Maintain regular walking and low-salt diet.  - amLODIPine (NORVASC) 5 MG tablet; TAKE 1 TABLET (5 MG) BY MOUTH DAILY.  Dispense: 90 tablet; Refill: 3  - atenolol (TENORMIN) 25 MG tablet; Take 1 tablet (25 mg total) by mouth daily.  Dispense: 90 tablet; Refill: 3  - losartan (COZAAR) 100 MG tablet; Take 1 tablet (100 mg total) by mouth daily.  Dispense: 90 tablet; Refill: 3    2. Hypercholesterolemia  I did discuss potential toxicity risk with atorvastatin including muscle and liver toxicity.  She has not shown signs of medication toxicity at this time.    I did discuss that consideration should be given to stopping atorvastatin now that she is over 81.  She could consider carotid and cardiac CT screening test in the future to evaluate for vascular disease.  She declined those tests at this time.    She wishes to continue on atorvastatin at this time.    Goal LDL less than 130  - atorvastatin (LIPITOR) 10 MG tablet; Take 1 tablet (10 mg total) by mouth at bedtime.  Dispense: 90 tablet; Refill: 3  - Lipid Cascade    3. Osteoporosis, unspecified osteoporosis type, unspecified pathological fracture presence  She has completed 5 years of Fosamax.  Bone density last year was slightly improved and not in the osteoporosis range.    I did discuss risk of brittle  type fractures with prolonged use of Fosamax.    She will stop Fosamax now, repeat DEXA scan in 1 to 2 years.  Continue regular walking and calcium and vitamin D supplement.    4. Encounter for therapeutic drug monitoring    - Comprehensive Metabolic Panel  - HM2(CBC w/o Differential)    5. Visit for screening mammogram  She is unsure if she wants to do breast cancer screening.  I did discuss risk of invasive procedure such as biopsy if she does do screening.  - Mammo Screening Bilateral; Future    She is plan to get a flu shot later this fall.    History of left leg radicular pain, possibly associate with herniated disc.  She did see orthopedic clinic and I reviewed their note from May 2019 with patient.  Plain films were done, no fracture or lytic lesions noted, DJD noted.  Plan was for follow-up if worsening symptoms to consider MRI.  Patient will continue regular walking, avoid prolonged sitting.    I did discuss risk of Aleve including bleeding risk and affecting blood pressure and kidney function.  She was told to use that only rarely.    She was reminded to make her routine fall ophthalmology follow-up.  Previous cataract surgery.    Return in about 6 months (around 3/24/2020) for Recheck.   Patient Instructions   Stop alendronate when you finish your current supply.    Plan to repeat DEXA scan for bone density in 1 to 2 years.    Continue daily walking.  Continue calcium and vitamin D supplement.    If blood pressure is running above 150/90 for more than a day, or running less than 110/60 especially with lightheaded dizzy spells, contact me to consider adjustment in your blood pressure medication.    You could consider stopping atorvastatin in the future, especially if you have muscle achiness or weakness.    Get flu shot this fall.    Follow-up in 6 months for routine checkup.  You do not need to fast for that visit.    Results of today's lab work will be mailed to you.    Make appointment to see  ophthalmology this fall for routine yearly checkup.    Consider scheduling mammogram for screening.    Raul Lewis MD        Current Outpatient Medications   Medication Sig Dispense Refill     amLODIPine (NORVASC) 5 MG tablet TAKE 1 TABLET (5 MG) BY MOUTH DAILY. 90 tablet 3     atenolol (TENORMIN) 25 MG tablet Take 1 tablet (25 mg total) by mouth daily. 90 tablet 3     atorvastatin (LIPITOR) 10 MG tablet Take 1 tablet (10 mg total) by mouth at bedtime. 90 tablet 3     calcium carbonate-simethicone (ANTACID ANTI-GAS) 1,000-60 mg Chew 750 mg. TAKE AS DIRECTED.        calcium carbonate-vitamin D2 500 mg(1,250mg) -200 unit tablet Take 1 tablet by mouth daily.              cholecalciferol, vitamin D3, (VITAMIN D3) 2,000 unit cap Take 1 capsule by mouth.       losartan (COZAAR) 100 MG tablet Take 1 tablet (100 mg total) by mouth daily. 90 tablet 3     No current facility-administered medications for this visit.      Allergies   Allergen Reactions     Fosinopril Cough     Social History     Tobacco Use     Smoking status: Former Smoker     Packs/day: 0.00     Smokeless tobacco: Never Used   Substance Use Topics     Alcohol use: Yes     Comment: occasional     Drug use: Not on file            Detail Level: Simple Additional Notes: Patient has tried and failed Otezla Render Risk Assessment In Note?: no

## 2025-08-05 DIAGNOSIS — E78.00 HYPERCHOLESTEROLEMIA: ICD-10-CM

## 2025-08-05 RX ORDER — ATORVASTATIN CALCIUM 10 MG/1
10 TABLET, FILM COATED ORAL AT BEDTIME
Qty: 90 TABLET | Refills: 0 | Status: SHIPPED | OUTPATIENT
Start: 2025-08-05